# Patient Record
Sex: FEMALE | Race: WHITE | Employment: OTHER | URBAN - METROPOLITAN AREA
[De-identification: names, ages, dates, MRNs, and addresses within clinical notes are randomized per-mention and may not be internally consistent; named-entity substitution may affect disease eponyms.]

---

## 2017-01-11 ENCOUNTER — HOSPITAL ENCOUNTER (OUTPATIENT)
Dept: LAB | Age: 82
Discharge: HOME | End: 2017-01-11
Payer: MEDICARE

## 2017-01-11 PROCEDURE — 36415 COLL VENOUS BLD VENIPUNCTURE: CPT

## 2017-01-11 PROCEDURE — 85610 PROTHROMBIN TIME: CPT

## 2017-02-14 ENCOUNTER — OFFICE VISIT (OUTPATIENT)
Dept: INTERNAL MEDICINE CLINIC | Age: 82
End: 2017-02-14

## 2017-02-14 VITALS
HEART RATE: 62 BPM | DIASTOLIC BLOOD PRESSURE: 70 MMHG | HEIGHT: 66 IN | OXYGEN SATURATION: 90 % | WEIGHT: 126 LBS | SYSTOLIC BLOOD PRESSURE: 135 MMHG | BODY MASS INDEX: 20.25 KG/M2 | TEMPERATURE: 97.8 F | RESPIRATION RATE: 16 BRPM

## 2017-02-14 DIAGNOSIS — Z79.01 CHRONIC ANTICOAGULATION: Primary | ICD-10-CM

## 2017-02-14 DIAGNOSIS — F41.9 ANXIETY: ICD-10-CM

## 2017-02-14 DIAGNOSIS — I10 ESSENTIAL HYPERTENSION: ICD-10-CM

## 2017-02-14 DIAGNOSIS — K58.1 IRRITABLE BOWEL SYNDROME WITH CONSTIPATION: ICD-10-CM

## 2017-02-14 DIAGNOSIS — I48.20 CHRONIC ATRIAL FIBRILLATION (HCC): ICD-10-CM

## 2017-02-14 DIAGNOSIS — R42 DIZZINESS: ICD-10-CM

## 2017-02-14 LAB
INR BLD: 2.3
PT POC: 27.9 SEC
VALID INTERNAL CONTROL?: YES

## 2017-02-14 RX ORDER — POLYETHYLENE GLYCOL 3350 17 G/17G
17 POWDER, FOR SOLUTION ORAL DAILY
Qty: 100 PACKET | Refills: 1 | Status: SHIPPED | OUTPATIENT
Start: 2017-02-14 | End: 2017-09-11 | Stop reason: SDUPTHER

## 2017-02-14 RX ORDER — ALPRAZOLAM 0.25 MG/1
0.25 TABLET ORAL
Qty: 30 TAB | Refills: 0 | Status: SHIPPED | OUTPATIENT
Start: 2017-02-14 | End: 2018-08-09 | Stop reason: SDUPTHER

## 2017-02-14 NOTE — MR AVS SNAPSHOT
Visit Information Date & Time Provider Department Dept. Phone Encounter #  
 2/14/2017  3:30 PM Marine Armando MD [facility] [address] [id number] Follow-up Instructions Return in about 3 months (around 5/14/2017). Upcoming Health Maintenance Date Due Pneumococcal 65+ Low/Medium Risk (2 of 2 - PPSV23) 10/9/2003 INFLUENZA AGE 9 TO ADULT 8/1/2016 GLAUCOMA SCREENING Q2Y 4/8/2017 MEDICARE YEARLY EXAM 2/16/2017 DTaP/Tdap/Td series (2 - Td) 5/17/2026 Allergies as of 2/14/2017  Review Complete On: 2/14/2017 By: Marv Linares LPN Severity Noted Reaction Type Reactions Pcn [Penicillins]  10/05/2015    Hives Current Immunizations  Reviewed on 10/5/2015 Name Date Influenza Vaccine (Quad) PF 10/5/2015 Not reviewed this visit You Were Diagnosed With   
  
 Codes Comments Chronic anticoagulation    -  Primary ICD-10-CM: Z79.01 
ICD-9-CM: V58.61 Chronic atrial fibrillation (HCC)     ICD-10-CM: Z60.3 ICD-9-CM: 427.31 Essential hypertension     ICD-10-CM: I10 
ICD-9-CM: 401.9 Anxiety     ICD-10-CM: F41.9 ICD-9-CM: 300.00 Dizziness     ICD-10-CM: N50 ICD-9-CM: 780.4 Irritable bowel syndrome with constipation     ICD-10-CM: K58.1 ICD-9-CM: 507.5 Vitals BP Pulse Temp Resp Height(growth percentile) Weight(growth percentile) 135/70 (BP 1 Location: Left arm, BP Patient Position: Sitting) 62 97.8 °F (36.6 °C) (Oral) 16 5' 6\" (1.676 m) 126 lb (57.2 kg) SpO2 BMI OB Status Smoking Status 90% 20.34 kg/m2 Hysterectomy Former Smoker Vitals History BMI and BSA Data Body Mass Index Body Surface Area  
 20.34 kg/m 2 1.63 m 2 Preferred Pharmacy Pharmacy Name Phone [id number] [address] AT [address] [phone] Your Updated Medication List  
  
   
 This list is accurate as of: 2/14/17  4:12 PM.  Always use your most recent med list.  
  
  
  
  
 ALPRAZolam 0.25 mg tablet Commonly known as:  Mosani Harp Take 1 Tab by mouth two (2) times daily as needed for Anxiety. Max Daily Amount: 0.5 mg.  
  
 amLODIPine 5 mg tablet Commonly known as:  [place] Citron Take 1 Tab by mouth daily. bisoprolol-hydroCHLOROthiazide 10-6.25 mg per tablet Commonly known as:  [facility] TAKE ONE TABLET BY MOUTH ONCE DAILY  
  
 celecoxib 200 mg capsule Commonly known as:  CELEBREX Take 1 Cap by mouth daily as needed. fluticasone 50 mcg/actuation nasal spray Commonly known as:  [place] [place] 2 Sprays by Both Nostrils route daily. lisinopril 10 mg tablet Commonly known as:  PRINIVIL, ZESTRIL  
TAKE 1 TABLET BY MOUTH ONCE DAILY  
  
 lubiPROStone 8 mcg capsule Commonly known as:  Royden Crofts Take 1 Cap by mouth two (2) times daily (with meals). nitrofurantoin 50 mg capsule Commonly known as:  MACRODANTIN Take 1 Cap by mouth daily. polyethylene glycol 17 gram packet Commonly known as:  Rohan Boer Take 1 Packet by mouth daily. * warfarin 5 mg tablet Commonly known as:  COUMADIN Take 1 Tab by mouth every Tuesday, Thursday, Saturday & Sunday. * warfarin 4 mg tablet Commonly known as:  COUMADIN Take 1 Tab by mouth every Monday, Wednesday, Friday. * Notice: This list has 2 medication(s) that are the same as other medications prescribed for you. Read the directions carefully, and ask your doctor or other care provider to review them with you. Prescriptions Printed Refills ALPRAZolam (XANAX) 0.25 mg tablet 0 Sig: Take 1 Tab by mouth two (2) times daily as needed for Anxiety. Max Daily Amount: 0.5 mg.  
 Class: Print Route: Oral  
  
Prescriptions Sent to Pharmacy Refills  
 polyethylene glycol (MIRALAX) 17 gram packet 1 Sig: Take 1 Packet by mouth daily.   
 Class: Normal  
 Pharmacy: [facility] [address] #: [phone] Route: Oral  
  
We Performed the Following AMB POC PT/INR [38681 CPT(R)] Follow-up Instructions Return in about 3 months (around 5/14/2017). To-Do List   
 Around 03/28/2017 Lab:  PROTHROMBIN TIME + INR Introducing [facility] SERVICES! Dear Gauri Vaca: Thank you for requesting a [employer] account. Our records indicate that you already have an active [employer] account. You can access your account anytime at [email or web address] Did you know that you can access your hospital and ER discharge instructions at any time in [employer]? You can also review all of your test results from your hospital stay or ER visit. Additional Information If you have questions, please visit the Frequently Asked Questions section of the [employer] website at [email or web address] Remember, [employer] is NOT to be used for urgent needs. For medical emergencies, dial 911. Now available from your iPhone and Android! Please provide this summary of care documentation to your next provider. Your primary care clinician is listed as Hannah Stanley. If you have any questions after today's visit, please call [phone].

## 2017-02-14 NOTE — PROGRESS NOTES
Wisam Victoria is a 80 y.o. female who presents today for Hypertension  . She has a history of   Patient Active Problem List   Diagnosis Code    HTN (hypertension) I10    IBS (irritable bowel syndrome) K58.9    Cataract H26.9    Diverticulosis K57.90    Atrial fibrillation (HCC) I48.91    Chronic cystitis N30.20    Chronic anticoagulation Z79.01    Advanced care planning/counseling discussion Z71.89   . Today patient is here for INR and HTN. she does have other concerns. Dizziness: Has had a couple episodes. No palpitations. Mild nausea. No vomiting. Pt notes feeling like she is going to pass out but doesn't. Her adult son has cheated on his wife. She is having some increased anxiety about this. Disappointed in her son. Pt has a Rx for Xanax from 2015 with still some. Anticoagulation  Patient here for followup of chronic anticoagulation. Indication: Atrial Fibrillation. Bleeding Signs/Symptoms:  None. Compliance with warfarin:  Yes  INR's are drawn regularly and have been Therapeutic. Lab Results   Component Value Date/Time    INR 2.4 01/11/2017 02:58 PM    INR 2.1 11/17/2016 04:38 PM    INR 3.4 10/17/2016 12:52 PM    INR POC 2.3 02/14/2017 03:51 PM    INR POC 1.7 08/18/2016 02:26 PM    INR POC 2.2 05/17/2016 02:29 PM    Prothrombin time 24.9 01/11/2017 02:58 PM    Prothrombin time 21.5 11/17/2016 04:38 PM    Prothrombin time 34.3 10/17/2016 12:52 PM     Hypertension  Hypertension ROS: taking medications as instructed, no medication side effects noted, no TIA's, no chest pain on exertion, no dyspnea on exertion, no swelling of ankles     reports that she has quit smoking. She has never used smokeless tobacco.    reports that she drinks alcohol. BP Readings from Last 2 Encounters:   02/14/17 135/70   11/17/16 136/85          ROS  Review of Systems   Constitutional: Negative for chills, fever and weight loss. HENT: Negative for congestion and sore throat.     Eyes: Negative for blurred vision, double vision and photophobia. Respiratory: Negative for cough and shortness of breath. Cardiovascular: Negative for chest pain, palpitations and leg swelling. Gastrointestinal: Negative for abdominal pain, constipation, diarrhea, heartburn, nausea and vomiting. Genitourinary: Negative for dysuria, frequency and urgency. Musculoskeletal: Negative for joint pain and myalgias. Skin: Negative for rash. Neurological: Negative. Negative for headaches. Endo/Heme/Allergies: Does not bruise/bleed easily. Psychiatric/Behavioral: Negative for depression, hallucinations, memory loss, substance abuse and suicidal ideas. The patient is nervous/anxious and has insomnia. Visit Vitals    /70 (BP 1 Location: Left arm, BP Patient Position: Sitting)    Pulse 62    Temp 97.8 °F (36.6 °C) (Oral)    Resp 16    Ht 5' 6\" (1.676 m)    Wt 126 lb (57.2 kg)    SpO2 90%    BMI 20.34 kg/m2       Physical Exam   Constitutional: She is oriented to person, place, and time. She appears well-developed and well-nourished. No distress. HENT:   Head: Normocephalic and atraumatic. Neck: Normal range of motion. Neck supple. No thyromegaly present. Cardiovascular: Normal rate and regular rhythm. No murmur heard. Pulmonary/Chest: Effort normal and breath sounds normal. She has no wheezes. Abdominal: Soft. Bowel sounds are normal. She exhibits no distension. Musculoskeletal: Normal range of motion. She exhibits no edema. Neurological: She is alert and oriented to person, place, and time. No cranial nerve deficit. Skin: Skin is warm and dry. Psychiatric: She has a normal mood and affect.  Her behavior is normal.         Current Outpatient Prescriptions   Medication Sig    bisoprolol-hydroCHLOROthiazide (ZIAC) 10-6.25 mg per tablet TAKE ONE TABLET BY MOUTH ONCE DAILY    lisinopril (PRINIVIL, ZESTRIL) 10 mg tablet TAKE 1 TABLET BY MOUTH ONCE DAILY    fluticasone (FLONASE) 50 mcg/actuation nasal spray 2 Sprays by Both Nostrils route daily.  lubiPROStone (AMITIZA) 8 mcg capsule Take 1 Cap by mouth two (2) times daily (with meals).  nitrofurantoin (MACRODANTIN) 50 mg capsule Take 1 Cap by mouth daily.  warfarin (COUMADIN) 4 mg tablet Take 1 Tab by mouth every Monday, Wednesday, Friday.  warfarin (COUMADIN) 5 mg tablet Take 1 Tab by mouth every Tuesday, Thursday, Saturday & .  celecoxib (CELEBREX) 200 mg capsule Take 1 Cap by mouth daily as needed.  polyethylene glycol (MIRALAX) 17 gram packet Take 1 Packet by mouth daily.  amLODIPine (NORVASC) 5 mg tablet Take 1 Tab by mouth daily. No current facility-administered medications for this visit. Past Medical History   Diagnosis Date    Diverticula, colon     Hypertension     IBS (irritable bowel syndrome)     Irregular heart beat       Past Surgical History   Procedure Laterality Date    Hx colonoscopy  8/28/15     with endoscopy day before    Hx hysterectomy      Hx  section       X 3      Social History   Substance Use Topics    Smoking status: Former Smoker    Smokeless tobacco: Never Used    Alcohol use Yes      Family History   Problem Relation Age of Onset    Tuberculosis Mother         Allergies   Allergen Reactions    Pcn [Penicillins] Hives        Assessment/Plan  Laurence Castro was seen today for hypertension. Diagnoses and all orders for this visit:    Chronic anticoagulation - stable continue current dose  -     AMB POC PT/INR  -     PROTHROMBIN TIME + INR; Future    Chronic atrial fibrillation (HCC) - rate controlled. Essential hypertension - stable. Anxiety - Has  bottle from . Refill for PRN use.   -     ALPRAZolam (XANAX) 0.25 mg tablet; Take 1 Tab by mouth two (2) times daily as needed for Anxiety. Max Daily Amount: 0.5 mg.    Dizziness - a couple of episodes recently. Discussed that needs pulse checked during these episodes.  If continues ECHO +/- holter. Irritable bowel syndrome with constipation  -     polyethylene glycol (MIRALAX) 17 gram packet; Take 1 Packet by mouth daily.         Follow-up Disposition: Not on File    Cb Savage MD  2/14/2017

## 2017-03-28 DIAGNOSIS — Z79.01 CHRONIC ANTICOAGULATION: ICD-10-CM

## 2017-04-10 ENCOUNTER — HOSPITAL ENCOUNTER (OUTPATIENT)
Dept: LAB | Age: 82
Discharge: HOME | End: 2017-04-10
Payer: MEDICARE

## 2017-04-10 PROCEDURE — 36415 COLL VENOUS BLD VENIPUNCTURE: CPT

## 2017-04-10 PROCEDURE — 85610 PROTHROMBIN TIME: CPT

## 2017-04-11 ENCOUNTER — TELEPHONE (OUTPATIENT)
Dept: INTERNAL MEDICINE CLINIC | Age: 82
End: 2017-04-11

## 2017-04-11 LAB
INR PPP: 2.6 (ref 0.8–1.2)
PROTHROMBIN TIME: 26.1 SEC (ref 9.1–12)

## 2017-05-19 ENCOUNTER — TELEPHONE (OUTPATIENT)
Dept: INTERNAL MEDICINE CLINIC | Age: 82
End: 2017-05-19

## 2017-05-19 NOTE — TELEPHONE ENCOUNTER
[facility] sent a fax the other day and has not heard any thing back on it was for the  Lisinopril 10 mg.   [facility] no [phone]

## 2017-06-12 ENCOUNTER — HOSPITAL ENCOUNTER (OUTPATIENT)
Dept: LAB | Age: 82
Discharge: HOME | End: 2017-06-12
Payer: MEDICARE

## 2017-06-12 ENCOUNTER — OFFICE VISIT (OUTPATIENT)
Dept: INTERNAL MEDICINE CLINIC | Age: 82
End: 2017-06-12

## 2017-06-12 VITALS
WEIGHT: 126 LBS | OXYGEN SATURATION: 96 % | RESPIRATION RATE: 16 BRPM | SYSTOLIC BLOOD PRESSURE: 114 MMHG | BODY MASS INDEX: 20.25 KG/M2 | TEMPERATURE: 98.3 F | HEART RATE: 98 BPM | HEIGHT: 66 IN | DIASTOLIC BLOOD PRESSURE: 70 MMHG

## 2017-06-12 DIAGNOSIS — J30.9 ALLERGIC RHINITIS, UNSPECIFIED ALLERGIC RHINITIS TRIGGER, UNSPECIFIED RHINITIS SEASONALITY: ICD-10-CM

## 2017-06-12 DIAGNOSIS — R25.1 TREMOR OF BOTH HANDS: ICD-10-CM

## 2017-06-12 DIAGNOSIS — Z13.39 SCREENING FOR ALCOHOLISM: ICD-10-CM

## 2017-06-12 DIAGNOSIS — Z00.00 MEDICARE ANNUAL WELLNESS VISIT, SUBSEQUENT: Primary | ICD-10-CM

## 2017-06-12 DIAGNOSIS — I48.20 CHRONIC ATRIAL FIBRILLATION (HCC): ICD-10-CM

## 2017-06-12 DIAGNOSIS — I10 ESSENTIAL HYPERTENSION: ICD-10-CM

## 2017-06-12 DIAGNOSIS — Z13.31 DEPRESSION SCREENING: ICD-10-CM

## 2017-06-12 DIAGNOSIS — Z71.89 ADVANCED CARE PLANNING/COUNSELING DISCUSSION: ICD-10-CM

## 2017-06-12 DIAGNOSIS — Z00.00 ROUTINE GENERAL MEDICAL EXAMINATION AT A HEALTH CARE FACILITY: ICD-10-CM

## 2017-06-12 PROCEDURE — 36415 COLL VENOUS BLD VENIPUNCTURE: CPT

## 2017-06-12 PROCEDURE — 80053 COMPREHEN METABOLIC PANEL: CPT

## 2017-06-12 PROCEDURE — 85610 PROTHROMBIN TIME: CPT

## 2017-06-12 PROCEDURE — 85025 COMPLETE CBC W/AUTO DIFF WBC: CPT

## 2017-06-12 PROCEDURE — 84443 ASSAY THYROID STIM HORMONE: CPT

## 2017-06-12 RX ORDER — FLUTICASONE PROPIONATE 50 MCG
2 SPRAY, SUSPENSION (ML) NASAL DAILY
Qty: 1 BOTTLE | Refills: 6 | Status: SHIPPED | OUTPATIENT
Start: 2017-06-12 | End: 2018-05-01 | Stop reason: SDUPTHER

## 2017-06-12 NOTE — MR AVS SNAPSHOT
Visit Information Date & Time Provider Department Dept. Phone Encounter #  
 6/12/2017  1:00 PM Cristofer Burrows MD [facility] [address] [id number] Follow-up Instructions Return for Follow up after MRI. Upcoming Health Maintenance Date Due Pneumococcal 65+ Low/Medium Risk (2 of 2 - PPSV23) 10/9/2003 MEDICARE YEARLY EXAM 2/16/2017 GLAUCOMA SCREENING Q2Y 4/8/2017 INFLUENZA AGE 9 TO ADULT 8/1/2017 DTaP/Tdap/Td series (2 - Td) 5/17/2026 Allergies as of 6/12/2017  Review Complete On: 6/40/8777 By: Les Ache Wears Severity Noted Reaction Type Reactions Pcn [Penicillins]  10/05/2015    Hives Current Immunizations  Reviewed on 10/5/2015 Name Date Influenza Vaccine (Quad) PF 10/5/2015 Not reviewed this visit You Were Diagnosed With   
  
 Codes Comments Chronic atrial fibrillation (HCC)    -  Primary ICD-10-CM: U52.8 ICD-9-CM: 427.31 Essential hypertension     ICD-10-CM: I10 
ICD-9-CM: 401.9 Tremor of both hands     ICD-10-CM: R25.1 ICD-9-CM: 781.0 Allergic rhinitis, unspecified allergic rhinitis trigger, unspecified rhinitis seasonality     ICD-10-CM: J30.9 ICD-9-CM: 477.9 Vitals BP Pulse Temp Resp Height(growth percentile) Weight(growth percentile) 114/70 (BP 1 Location: Left arm, BP Patient Position: Sitting) 98 98.3 °F (36.8 °C) (Oral) 16 5' 6\" (1.676 m) 126 lb (57.2 kg) SpO2 BMI OB Status Smoking Status 96% 20.34 kg/m2 Hysterectomy Former Smoker Vitals History BMI and BSA Data Body Mass Index Body Surface Area  
 20.34 kg/m 2 1.63 m 2 Preferred Pharmacy Pharmacy Name Phone [id number] [place] AT [facility] [phone] Your Updated Medication List  
  
   
This list is accurate as of: 6/12/17  1:41 PM.  Always use your most recent med list.  
  
  
  
  
 ALPRAZolam 0.25 mg tablet Commonly known as:  Graeme Inch Take 1 Tab by mouth two (2) times daily as needed for Anxiety. Max Daily Amount: 0.5 mg.  
  
 amLODIPine 5 mg tablet Commonly known as:  [place] Pupa Take 1 Tab by mouth daily. bisoprolol-hydroCHLOROthiazide 10-6.25 mg per tablet Commonly known as:  [facility] TAKE ONE TABLET BY MOUTH ONCE DAILY  
  
 celecoxib 200 mg capsule Commonly known as:  CELEBREX Take 1 Cap by mouth daily as needed. fluticasone 50 mcg/actuation nasal spray Commonly known as:  Randi Morrison 2 Sprays by Both Nostrils route daily. lisinopril 10 mg tablet Commonly known as:  PRINIVIL, ZESTRIL  
TAKE 1 TABLET BY MOUTH EVERY DAY  
  
 lubiPROStone 8 mcg capsule Commonly known as:  Eulah Michelle Take 1 Cap by mouth two (2) times daily (with meals). nitrofurantoin 50 mg capsule Commonly known as:  MACRODANTIN  
TAKE ONE CAPSULE BY MOUTH EVERY DAY  
  
 polyethylene glycol 17 gram packet Commonly known as:  Matt Aus Take 1 Packet by mouth daily. * warfarin 5 mg tablet Commonly known as:  COUMADIN Take 1 Tab by mouth every Tuesday, Thursday, Saturday & . * warfarin 4 mg tablet Commonly known as:  COUMADIN Take 1 Tab by mouth every Monday, Wednesday, Friday. * Notice: This list has 2 medication(s) that are the same as other medications prescribed for you. Read the directions carefully, and ask your doctor or other care provider to review them with you. Prescriptions Sent to Pharmacy Refills  
 fluticasone (FLONASE) 50 mcg/actuation nasal spray 6 Si Sprays by Both Nostrils route daily. Class: Normal  
 Pharmacy: [id number] Ph #: [phone] Route: Both Nostrils We Performed the Following CBC WITH AUTOMATED DIFF [81806 CPT(R)] METABOLIC PANEL, COMPREHENSIVE [52517 CPT(R)] PROTHROMBIN TIME + INR [87682 CPT(R)] TSH 3RD GENERATION [98208 CPT(R)] Follow-up Instructions Return for Follow up after MRI. To-Do List   
 06/12/2017 Imaging:  MRI BRAIN W WO CONT Introducing [facility] SERVICES! Dear Jose Smith: Thank you for requesting a [employer] account. Our records indicate that you already have an active [employer] account. You can access your account anytime at [email or web address] Did you know that you can access your hospital and ER discharge instructions at any time in [employer]? You can also review all of your test results from your hospital stay or ER visit. Additional Information If you have questions, please visit the Frequently Asked Questions section of the [employer] website at [email or web address] Remember, [employer] is NOT to be used for urgent needs. For medical emergencies, dial 911. Now available from your iPhone and Android! Please provide this summary of care documentation to your next provider. Your primary care clinician is listed as Wyonia December. If you have any questions after today's visit, please call [phone].

## 2017-06-12 NOTE — PATIENT INSTRUCTIONS
Today's Date -  6/12/2017  Medicare Part B Preventive Services Limitations Recommendation/Date completed if known Scheduled/ Next Due   Bone Mass Measurement  (age 72 & older, biennial) Requires diagnosis related to osteoporosis or estrogen deficiency. Biennial benefit unless patient has history of long-term glucocorticoid tx or baseline is needed because initial test was by other method Completed:  2003 per pt, would like to wait till after MRI to have done. Recommended every 2 years As recommended by your specialist or PCP   Cardiovascular Screening Blood Tests (every 5 years)  Total cholesterol, HDL, Triglycerides Order as a panel if possible Completed:  11/2016    Recommended annually As recommended by your PCP. Colorectal Cancer Screening  -Fecal occult blood test (annual)  -Flexible sigmoidoscopy (5y)  -Screening colonoscopy (10y)  -Barium Enema  Completed:  8/2015    No longer needed these exams  Recommended  Every 5to 10 years. As recommended by your PCP/Specialist   Counseling to Prevent Tobacco Use (up to 8 sessions per year)  - Counseling greater than 3 and up to 10 minutes  - Counseling greater than 10 minutes Patients must be asymptomatic of tobacco-related conditions to receive as preventive service n/a n/a   Prevnar 13 vaccine    9/2015   complete     TDAP Vaccine    9/2015 complete   Shingles Vaccine        6/2009 complete   Influenza Vaecine     11/2016   Due Every Fall   Pneumonia Vaccine      10/2002 complete   Diabetes Screening Tests (at least every 3 years, Medicare covers annually or at 6-month intervals for prediabetic patients)    Fasting blood sugar (FBS) or glucose tolerance test (GTT)       Patient must be diagnosed with one of the following:  -Hypertension, Dyslipidemia, obesity, previous impaired FBS or GTT  Or any two of the following: overweight, FH of diabetes, age ? 72, history of gestational diabetes, birth of baby weighing more than 9 pounds Completed:    11/2016    Recommended every 3 years for non-diabetics. Recommended every 3-6 months for Pre-Diabetics and Diabetes. DUE:   Diabetes Self-Management Training (DSMT) (no USPSTF recommendation) Requires referral by treating physician for patient with diabetes or renal disease. 10 hours of initial DSMT session of no less than 30 minutes each in a continuous 12-month period. 2 hours of follow-up DSMT in subsequent years. n/a n/a   Glaucoma Screening (no USPSTF recommendation) Diabetes mellitus, family history, , age 48 or over,  American, age 72 or over Completed: Within the last year    Recommended annually DUE: annually   Medical Nutrition Therapy (MNT) (fordiabetes or renal disease not recommended schedule) Requires referral by treating physician for patient with diabetes or renal disease. Can be provided in same year as diabetes self-management training (DSMT), and CMS recommends medical nutrition therapy take place after DSMT. Up to 3 hours for initial year and 2 hours in subsequent years. n/a n/a   Hepatitis B Vaccinations (if medium/high risk) Medium/high risk factors:  End-stage renal disease,  Hemophiliacs who received Factor VIII or IX concentrates, Clients of institutions for the mentally retarded, Persons who live in the same house as a HepB virus carrier, Homosexual men, Illicit injectable drug abusers. n/a n/a   Screening Mammography (biennial age 54-69)? Annually (age 36 or over) Completed:     No longer needing  Recommended annually over the age of 36. As recommended by your PCP/Specialist   Screening Pap Tests and Pelvic Examination (up to age 79 and after 79 if unknown history or abnormal study last 10 years) Every 24 months except high risk Completed:      No longer needing  Recommended every 2 years As recommended by your PCP/Specialist   Thanks for coming in today. It was nice to spend some time with you.  If you have any questions about your visit today, please call [phone] and ask to speak with Natali

## 2017-06-12 NOTE — PROGRESS NOTES
Rah Billingsley is a 80 y.o. female who presents today for Irregular Heart Beat; Hypertension; and Annual Wellness Visit  . She has a history of   Patient Active Problem List   Diagnosis Code    HTN (hypertension) I10    IBS (irritable bowel syndrome) K58.9    Cataract H26.9    Diverticulosis K57.90    Atrial fibrillation (HCC) I48.91    Chronic cystitis N30.20    Chronic anticoagulation Z79.01    Advanced care planning/counseling discussion Z71.89   . Today patient is here for f/u on her INR, a tremor and MW. MW portion performed by NN, please see her note. Tremor: noted to be getting a bit worse. Seems to be Intention Tremor. No meds have changed. Mental status stable and doing well. Mild gait issues. Exercise tolerance is stable. Some mild weakness in hands which is chronic. This is new as to last couple weeks. Still taking amitiza. She is on a very low dose BB. Some weakness. No LOC, no dizziness, but some ortho stasis. Anticoagulation  Patient here for followup of chronic anticoagulation. Indication: Atrial Fibrillation. Bleeding Signs/Symptoms:  None. Compliance with warfarin:  Yes  INR's are drawn regularly and have been Therapeutic. Lab Results   Component Value Date/Time    INR 2.6 04/10/2017 11:50 AM    INR 2.4 01/11/2017 02:58 PM    INR 2.1 11/17/2016 04:38 PM    INR POC 2.3 02/14/2017 03:51 PM    Prothrombin time 26.1 04/10/2017 11:50 AM    Prothrombin time 24.9 01/11/2017 02:58 PM    Prothrombin time 21.5 11/17/2016 04:38 PM     Hypertension - now on 3 meds. CCB restarted due to some elevations. She will check this when feeling poor. Hypertension ROS: taking medications as instructed, no medication side effects noted, no TIA's, no chest pain on exertion, no dyspnea on exertion, no swelling of ankles     reports that she has quit smoking. She has never used smokeless tobacco.    reports that she drinks alcohol.    BP Readings from Last 2 Encounters:   06/12/17 114/70 02/14/17 135/70         ROS  Review of Systems   Constitutional: Negative for chills, diaphoresis, fever, malaise/fatigue and weight loss. HENT: Negative for ear discharge, ear pain, hearing loss, nosebleeds and tinnitus. Eyes: Negative for blurred vision, double vision, photophobia and pain. Respiratory: Negative for cough, hemoptysis, sputum production and shortness of breath. Cardiovascular: Negative for chest pain, palpitations, orthopnea and claudication. Gastrointestinal: Positive for constipation. Negative for abdominal pain, diarrhea, heartburn, nausea and vomiting. Genitourinary: Negative for dysuria, frequency, hematuria and urgency. Musculoskeletal: Positive for joint pain (Hand pain). Negative for myalgias. Skin: Negative for itching and rash. Neurological: Positive for tremors. Negative for dizziness, tingling, sensory change, speech change, focal weakness, seizures, loss of consciousness and headaches. Psychiatric/Behavioral: Negative for depression, hallucinations, substance abuse and suicidal ideas. Visit Vitals    /70 (BP 1 Location: Left arm, BP Patient Position: Sitting)    Pulse 98    Temp 98.3 °F (36.8 °C) (Oral)    Resp 16    Ht 5' 6\" (1.676 m)    Wt 126 lb (57.2 kg)    SpO2 96%    BMI 20.34 kg/m2       Physical Exam   Constitutional: She is oriented to person, place, and time. She appears well-developed and well-nourished. HENT:   Head: Normocephalic and atraumatic. Eyes: EOM are normal. Pupils are equal, round, and reactive to light. Cardiovascular:   No murmur heard. Irregular. Pulmonary/Chest: Effort normal and breath sounds normal.   Abdominal: Soft. Bowel sounds are normal.   Neurological: She is alert and oriented to person, place, and time. She displays normal reflexes. No cranial nerve deficit. She exhibits normal muscle tone. Coordination normal.   Cerebellar testing, finger to nose and Romberg normal.   No obvious tremor present. Skin: Skin is warm and dry. Psychiatric: She has a normal mood and affect. Her behavior is normal.         Current Outpatient Prescriptions   Medication Sig    fluticasone (FLONASE) 50 mcg/actuation nasal spray 2 Sprays by Both Nostrils route daily.  nitrofurantoin (MACRODANTIN) 50 mg capsule TAKE ONE CAPSULE BY MOUTH EVERY DAY    lisinopril (PRINIVIL, ZESTRIL) 10 mg tablet TAKE 1 TABLET BY MOUTH EVERY DAY    ALPRAZolam (XANAX) 0.25 mg tablet Take 1 Tab by mouth two (2) times daily as needed for Anxiety. Max Daily Amount: 0.5 mg.  polyethylene glycol (MIRALAX) 17 gram packet Take 1 Packet by mouth daily.  bisoprolol-hydroCHLOROthiazide (ZIAC) 10-6.25 mg per tablet TAKE ONE TABLET BY MOUTH ONCE DAILY    lubiPROStone (AMITIZA) 8 mcg capsule Take 1 Cap by mouth two (2) times daily (with meals). (Patient taking differently: Take 8 mcg by mouth every other day.)    warfarin (COUMADIN) 4 mg tablet Take 1 Tab by mouth every Monday, Wednesday, Friday.  warfarin (COUMADIN) 5 mg tablet Take 1 Tab by mouth every Tuesday, Thursday, Saturday & .  celecoxib (CELEBREX) 200 mg capsule Take 1 Cap by mouth daily as needed.  amLODIPine (NORVASC) 5 mg tablet Take 1 Tab by mouth daily. No current facility-administered medications for this visit.          Past Medical History:   Diagnosis Date    Diverticula, colon     Hypertension     IBS (irritable bowel syndrome)     Irregular heart beat       Past Surgical History:   Procedure Laterality Date    HX  SECTION      X 3    HX COLONOSCOPY  8/28/15    with endoscopy day before    HX HYSTERECTOMY        Social History   Substance Use Topics    Smoking status: Former Smoker    Smokeless tobacco: Never Used    Alcohol use Yes      Family History   Problem Relation Age of Onset    Tuberculosis Mother         Allergies   Allergen Reactions    Pcn [Penicillins] Hives        Assessment/Plan  Kel Castro was seen today for irregular heart beat, hypertension and annual wellness visit. Diagnoses and all orders for this visit:    Medicare annual wellness visit, subsequent - See NN note. HM UTD. Not interested in further cancer prevention screening. Very healthy and active at her age. Everardo Bee was counseled on age-appropriate/ guideline-based risk prevention behaviors and screening for a 80y.o. year old   female . We also discussed adjustments in screening based on family history if necessary. Printed instructions for preventative screening guidelines and healthy behaviors given to patient with after visit summary. Chronic atrial fibrillation (HCC) - on anticoagulation. Check INR  -     PROTHROMBIN TIME + INR    Essential hypertension - Borderline low today. Pt to check this as outpt as she can have some orthostatic symptoms at times. Tremor of both hands - new and acute in onset. Only with use, no tremor with rest. No family history. Image head as she has history of Afib. Blood work. May increase BB dose if workup negative to treat essential tremor.   -     TSH 3RD GENERATION  -     METABOLIC PANEL, COMPREHENSIVE  -     CBC WITH AUTOMATED DIFF  -     MRI BRAIN W WO CONT; Future    Allergic rhinitis, unspecified allergic rhinitis trigger, unspecified rhinitis seasonality  -     fluticasone (FLONASE) 50 mcg/actuation nasal spray; 2 Sprays by Both Nostrils route daily. Routine general medical examination at a health care facility    Screening for alcoholism    Advanced care planning/counseling discussion    Depression screening        Follow-up Disposition:  Return for Follow up after MRI.     Katey Castanon MD  6/12/2017

## 2017-06-12 NOTE — PROGRESS NOTES
This is a Subsequent Medicare Annual Wellness Visit providing Personalized Prevention Plan Services (PPPS) (Performed 12 months after initial AWV and PPPS )    I have reviewed the patient's medical history in detail and updated the computerized patient record. History     Past Medical History:   Diagnosis Date    Diverticula, colon     Hypertension     IBS (irritable bowel syndrome)     Irregular heart beat       Past Surgical History:   Procedure Laterality Date    HX  SECTION      X 3    HX COLONOSCOPY  8/28/15    with endoscopy day before    HX HYSTERECTOMY       Current Outpatient Prescriptions   Medication Sig Dispense Refill    fluticasone (FLONASE) 50 mcg/actuation nasal spray 2 Sprays by Both Nostrils route daily. 1 Bottle 6    nitrofurantoin (MACRODANTIN) 50 mg capsule TAKE ONE CAPSULE BY MOUTH EVERY DAY 90 Cap 1    lisinopril (PRINIVIL, ZESTRIL) 10 mg tablet TAKE 1 TABLET BY MOUTH EVERY DAY 90 Tab 1    ALPRAZolam (XANAX) 0.25 mg tablet Take 1 Tab by mouth two (2) times daily as needed for Anxiety. Max Daily Amount: 0.5 mg. 30 Tab 0    polyethylene glycol (MIRALAX) 17 gram packet Take 1 Packet by mouth daily. 100 Packet 1    bisoprolol-hydroCHLOROthiazide (ZIAC) 10-6.25 mg per tablet TAKE ONE TABLET BY MOUTH ONCE DAILY 90 Tab 1    lubiPROStone (AMITIZA) 8 mcg capsule Take 1 Cap by mouth two (2) times daily (with meals). (Patient taking differently: Take 8 mcg by mouth every other day.) 60 Cap 3    warfarin (COUMADIN) 4 mg tablet Take 1 Tab by mouth every Monday, Wednesday, Friday. 30 Tab 5    warfarin (COUMADIN) 5 mg tablet Take 1 Tab by mouth every Tuesday, Thursday, Saturday & . 30 Tab 3    celecoxib (CELEBREX) 200 mg capsule Take 1 Cap by mouth daily as needed. 30 Cap 4    amLODIPine (NORVASC) 5 mg tablet Take 1 Tab by mouth daily.  30 Tab 3     Allergies   Allergen Reactions    Pcn [Penicillins] Hives     Family History   Problem Relation Age of Onset    Tuberculosis Mother      Social History   Substance Use Topics    Smoking status: Former Smoker    Smokeless tobacco: Never Used    Alcohol use Yes     Patient Active Problem List   Diagnosis Code    HTN (hypertension) I10    IBS (irritable bowel syndrome) K58.9    Cataract H26.9    Diverticulosis K57.90    Atrial fibrillation (HCC) I48.91    Chronic cystitis N30.20    Chronic anticoagulation Z79.01    Advanced care planning/counseling discussion Z71.89       Depression Risk Factor Screening:     PHQ over the last two weeks 6/12/2017   Little interest or pleasure in doing things Not at all   Feeling down, depressed or hopeless Not at all   Total Score PHQ 2 0     Alcohol Risk Factor Screening: On any occasion during the past 3 months, have you had more than 3 drinks containing alcohol? No    Do you average more than 7 drinks per week? No      Functional Ability and Level of Safety:     Hearing Loss   normal-to-mild    Activities of Daily Living   Self-care. Requires assistance with: no ADLs    Fall Risk   Patient states that she has fallen a few times with tripping over rugs or slipping on the floor but neither with any acute injuries at that time. Fall Risk Assessment, last 12 mths 6/12/2017   Able to walk? Yes   Fall in past 12 months? Yes   Fall with injury? No   Number of falls in past 12 months 1   Fall Risk Score 1     Abuse Screen   Patient is not abused    Review of Systems   Not required    Physical Examination     Evaluation of Cognitive Function:  Mood/affect:  happy  Appearance: age appropriate  Family member/caregiver input: daughter present    No exam performed today, MWV today.     Patient Care Team:  Gladys Staley MD as PCP - General (Internal Medicine)    Advice/Referrals/Counseling   Education and counseling provided:  Pneumococcal Vaccine  Influenza Vaccine  Screening Mammography  Colorectal cancer screening tests  Bone mass measurement (DEXA)  Advanced Medical Directive      Assessment/Plan       ICD-10-CM ICD-9-CM    1. Chronic atrial fibrillation (HCC) I48.2 427.31 PROTHROMBIN TIME + INR   2. Essential hypertension I10 401.9    3. Tremor of both hands R25.1 781.0 TSH 3RD GENERATION      METABOLIC PANEL, COMPREHENSIVE      CBC WITH AUTOMATED DIFF      MRI BRAIN W WO CONT   4. Allergic rhinitis, unspecified allergic rhinitis trigger, unspecified rhinitis seasonality J30.9 477.9 fluticasone (FLONASE) 50 mcg/actuation nasal spray   . 1. Patient states that a completed Advanced Medical Directive is at home. NN encouraged patient to bring a copy of the completed Advanced Medical Directive to the office for scanning into the medical record. Patient verbalized understanding & agreement. 2. Patient is up to date on the following immunizations:   Immunization History   Administered Date(s) Administered    Influenza High Dose Vaccine PF 11/01/2016    Influenza Vaccine (Quad) PF 10/05/2015    Pneumococcal Conjugate (PCV-13) 09/01/2015    Pneumococcal Polysaccharide (PPSV-23) 10/01/2002    Tdap 09/01/2015    Zoster Vaccine, Live 06/01/2009     3. Patient reports having a routine eye exam and glaucoma screening this past year with Dr. Tavares Lynch. Will fax over a KARLENE and obtain records and last noted with patient's verbal approval.     4. Please see depression screening and fall risk assessment section for additional information. Patient verbalized understanding of information presented and was given and opportunity to ask questions. Patient provided AVS, either a printed version or electronic version in [place], which includes Medicare Wellness Preventative Screening Table. Medication reconciliation completed by MA/ LPN and reviewed by PCP. Please bring a copy of your completed advance medical directive to the office so it may be added to your medical record. Thank you.

## 2017-06-13 LAB
ALBUMIN SERPL-MCNC: 4.4 G/DL (ref 3.5–4.7)
ALBUMIN/GLOB SERPL: 1.8 {RATIO} (ref 1.2–2.2)
ALP SERPL-CCNC: 110 IU/L (ref 39–117)
ALT SERPL-CCNC: 16 IU/L (ref 0–32)
AST SERPL-CCNC: 21 IU/L (ref 0–40)
BASOPHILS # BLD AUTO: 0.1 X10E3/UL (ref 0–0.2)
BASOPHILS NFR BLD AUTO: 1 %
BILIRUB SERPL-MCNC: 0.9 MG/DL (ref 0–1.2)
BUN SERPL-MCNC: 16 MG/DL (ref 8–27)
BUN/CREAT SERPL: 23 (ref 12–28)
CALCIUM SERPL-MCNC: 9.2 MG/DL (ref 8.7–10.3)
CHLORIDE SERPL-SCNC: 97 MMOL/L (ref 96–106)
CO2 SERPL-SCNC: 26 MMOL/L (ref 18–29)
CREAT SERPL-MCNC: 0.7 MG/DL (ref 0.57–1)
EOSINOPHIL # BLD AUTO: 0.1 X10E3/UL (ref 0–0.4)
EOSINOPHIL NFR BLD AUTO: 2 %
ERYTHROCYTE [DISTWIDTH] IN BLOOD BY AUTOMATED COUNT: 14.3 % (ref 12.3–15.4)
GLOBULIN SER CALC-MCNC: 2.4 G/DL (ref 1.5–4.5)
GLUCOSE SERPL-MCNC: 104 MG/DL (ref 65–99)
HCT VFR BLD AUTO: 43.3 % (ref 34–46.6)
HGB BLD-MCNC: 14.8 G/DL (ref 11.1–15.9)
IMM GRANULOCYTES # BLD: 0 X10E3/UL (ref 0–0.1)
IMM GRANULOCYTES NFR BLD: 0 %
INR PPP: 2.4 (ref 0.8–1.2)
LYMPHOCYTES # BLD AUTO: 1.3 X10E3/UL (ref 0.7–3.1)
LYMPHOCYTES NFR BLD AUTO: 24 %
MCH RBC QN AUTO: 33 PG (ref 26.6–33)
MCHC RBC AUTO-ENTMCNC: 34.2 G/DL (ref 31.5–35.7)
MCV RBC AUTO: 97 FL (ref 79–97)
MONOCYTES # BLD AUTO: 0.5 X10E3/UL (ref 0.1–0.9)
MONOCYTES NFR BLD AUTO: 9 %
NEUTROPHILS # BLD AUTO: 3.5 X10E3/UL (ref 1.4–7)
NEUTROPHILS NFR BLD AUTO: 64 %
PLATELET # BLD AUTO: 185 X10E3/UL (ref 150–379)
POTASSIUM SERPL-SCNC: 4.4 MMOL/L (ref 3.5–5.2)
PROT SERPL-MCNC: 6.8 G/DL (ref 6–8.5)
PROTHROMBIN TIME: 24.1 SEC (ref 9.1–12)
RBC # BLD AUTO: 4.48 X10E6/UL (ref 3.77–5.28)
SODIUM SERPL-SCNC: 141 MMOL/L (ref 134–144)
TSH SERPL DL<=0.005 MIU/L-ACNC: 0.59 UIU/ML (ref 0.45–4.5)
WBC # BLD AUTO: 5.5 X10E3/UL (ref 3.4–10.8)

## 2017-06-14 NOTE — PROGRESS NOTES
Please let her know that I sent her a letter, but that her INR is stable. No need to change dose of coumadin.

## 2017-06-16 ENCOUNTER — TELEPHONE (OUTPATIENT)
Dept: INTERNAL MEDICINE CLINIC | Age: 82
End: 2017-06-16

## 2017-06-26 ENCOUNTER — OFFICE VISIT (OUTPATIENT)
Dept: INTERNAL MEDICINE CLINIC | Age: 82
End: 2017-06-26

## 2017-06-26 VITALS
SYSTOLIC BLOOD PRESSURE: 120 MMHG | HEIGHT: 66 IN | WEIGHT: 126 LBS | BODY MASS INDEX: 20.25 KG/M2 | TEMPERATURE: 98.3 F | DIASTOLIC BLOOD PRESSURE: 69 MMHG | RESPIRATION RATE: 16 BRPM | HEART RATE: 82 BPM | OXYGEN SATURATION: 98 %

## 2017-06-26 DIAGNOSIS — Z79.01 CHRONIC ANTICOAGULATION: ICD-10-CM

## 2017-06-26 DIAGNOSIS — R25.1 TREMOR: Primary | ICD-10-CM

## 2017-06-26 NOTE — PROGRESS NOTES
Nancy Cho is a 80 y.o. female who presents today for Irregular Heart Beat  . She has a history of   Patient Active Problem List   Diagnosis Code    HTN (hypertension) I10    IBS (irritable bowel syndrome) K58.9    Cataract H26.9    Diverticulosis K57.90    Atrial fibrillation (HCC) I48.91    Chronic cystitis N30.20    Chronic anticoagulation Z79.01    Advanced care planning/counseling discussion Z71.89   . Today patient is here for f/u. Problem visit:  Nancy Cho is here for f/u on MRI. Tremor: noted to be getting a bit worse. Seems to be Intention Tremor. No meds have changed. Mental status stable and doing well. Mild gait issues. Exercise tolerance is stable. Character of problem: worsening. Associated symptoms include: none, but notes that her legs have been getting a bit worse. Treatments tried include: medication not used      ROS  Review of Systems   Constitutional: Negative for chills, fever and weight loss. HENT: Negative. Eyes: Negative for blurred vision, double vision, photophobia and pain. Respiratory: Negative for cough, hemoptysis and sputum production. Cardiovascular: Negative for chest pain, palpitations, orthopnea and claudication. Gastrointestinal: Negative for heartburn, nausea and vomiting. Genitourinary: Negative for dysuria, frequency and urgency. Musculoskeletal: Negative for back pain, myalgias and neck pain. Neurological: Positive for tremors and sensory change (Mild tingling of legs). Negative for dizziness, tingling, speech change and focal weakness. Visit Vitals    /69 (BP 1 Location: Left arm, BP Patient Position: Sitting)    Pulse 82    Temp 98.3 °F (36.8 °C) (Oral)    Resp 16    Ht 5' 6\" (1.676 m)    Wt 126 lb (57.2 kg)    SpO2 98%    BMI 20.34 kg/m2       Physical Exam   Constitutional: She is oriented to person, place, and time. She appears well-developed and well-nourished.    HENT:   Head: Normocephalic and atraumatic. Neck: Normal range of motion. Neck supple. Cardiovascular: Normal rate and regular rhythm. Pulmonary/Chest: Effort normal and breath sounds normal.   Abdominal: Soft. Bowel sounds are normal.   Musculoskeletal: Normal range of motion. She exhibits no edema. Pain to hands due to arthritis. Neurological: She is alert and oriented to person, place, and time. She has normal reflexes. No cranial nerve deficit. Cerebellar testing normal. No obvious tremor. Skin: Skin is warm and dry. Psychiatric: She has a normal mood and affect. Her behavior is normal.         Current Outpatient Prescriptions   Medication Sig    fluticasone (FLONASE) 50 mcg/actuation nasal spray 2 Sprays by Both Nostrils route daily.  nitrofurantoin (MACRODANTIN) 50 mg capsule TAKE ONE CAPSULE BY MOUTH EVERY DAY    lisinopril (PRINIVIL, ZESTRIL) 10 mg tablet TAKE 1 TABLET BY MOUTH EVERY DAY    ALPRAZolam (XANAX) 0.25 mg tablet Take 1 Tab by mouth two (2) times daily as needed for Anxiety. Max Daily Amount: 0.5 mg.  polyethylene glycol (MIRALAX) 17 gram packet Take 1 Packet by mouth daily.  bisoprolol-hydroCHLOROthiazide (ZIAC) 10-6.25 mg per tablet TAKE ONE TABLET BY MOUTH ONCE DAILY    lubiPROStone (AMITIZA) 8 mcg capsule Take 1 Cap by mouth two (2) times daily (with meals). (Patient taking differently: Take 8 mcg by mouth every other day.)    warfarin (COUMADIN) 4 mg tablet Take 1 Tab by mouth every Monday, Wednesday, Friday.  warfarin (COUMADIN) 5 mg tablet Take 1 Tab by mouth every Tuesday, Thursday, Saturday & Sunday.  celecoxib (CELEBREX) 200 mg capsule Take 1 Cap by mouth daily as needed.  amLODIPine (NORVASC) 5 mg tablet Take 1 Tab by mouth daily. No current facility-administered medications for this visit.          Past Medical History:   Diagnosis Date    Diverticula, colon     Hypertension     IBS (irritable bowel syndrome)     Irregular heart beat       Past Surgical History: Procedure Laterality Date    HX  SECTION      X 3    HX COLONOSCOPY  8/28/15    with endoscopy day before    HX HYSTERECTOMY        Social History   Substance Use Topics    Smoking status: Former Smoker    Smokeless tobacco: Never Used    Alcohol use Yes      Family History   Problem Relation Age of Onset    Tuberculosis Mother         Allergies   Allergen Reactions    Pcn [Penicillins] Hives        Assessment/Plan  Les Magallon was seen today for irregular heart beat. Diagnoses and all orders for this visit:    Tremor - no tremor during exam. Pt having some limitation due to this. MRI normal. Will have Neuro evaluate.   -     REFERRAL TO NEUROLOGY    Chronic anticoagulation - INR in 6 weeks. -     PROTHROMBIN TIME + INR;  Future        Follow-up Disposition: Not on File    Lizet Tello MD  2017

## 2017-06-26 NOTE — MR AVS SNAPSHOT
Visit Information Date & Time Provider Department Dept. Phone Encounter #  
 6/26/2017  1:00 PM Adalid Hawkins MD [facility] [phone] [id number] Upcoming Health Maintenance Date Due  
 GLAUCOMA SCREENING Q2Y 4/8/2017 INFLUENZA AGE 9 TO ADULT 8/1/2017 MEDICARE YEARLY EXAM 6/13/2018 DTaP/Tdap/Td series (2 - Td) 5/17/2026 Allergies as of 6/26/2017  Review Complete On: 3/91/0974 By: Kip Peters Severity Noted Reaction Type Reactions Pcn [Penicillins]  10/05/2015    Hives Current Immunizations  Reviewed on 10/5/2015 Name Date Influenza High Dose Vaccine PF 11/1/2016 Influenza Vaccine (Quad) PF 10/5/2015 Pneumococcal Conjugate (PCV-13) 9/1/2015 Pneumococcal Polysaccharide (PPSV-23) 10/1/2002 Tdap 9/1/2015 Zoster Vaccine, Live 6/1/2009 Not reviewed this visit You Were Diagnosed With   
  
 Codes Comments Tremor    -  Primary ICD-10-CM: R25.1 ICD-9-CM: 648. 0 Chronic anticoagulation     ICD-10-CM: Z79.01 
ICD-9-CM: V58.61 Vitals BP Pulse Temp Resp Height(growth percentile) Weight(growth percentile) 120/69 (BP 1 Location: Left arm, BP Patient Position: Sitting) 82 98.3 °F (36.8 °C) (Oral) 16 5' 6\" (1.676 m) 126 lb (57.2 kg) SpO2 BMI OB Status Smoking Status 98% 20.34 kg/m2 Hysterectomy Former Smoker Vitals History BMI and BSA Data Body Mass Index Body Surface Area  
 20.34 kg/m 2 1.63 m 2 Preferred Pharmacy Pharmacy Name Phone [id number] [address] [facility] [phone] Your Updated Medication List  
  
   
This list is accurate as of: 6/26/17  1:28 PM.  Always use your most recent med list.  
  
  
  
  
 ALPRAZolam 0.25 mg tablet Commonly known as:  Massimo Fuse Take 1 Tab by mouth two (2) times daily as needed for Anxiety. Max Daily Amount: 0.5 mg.  
  
 amLODIPine 5 mg tablet Commonly known as:  Berrios Luiz Take 1 Tab by mouth daily. bisoprolol-hydroCHLOROthiazide 10-6.25 mg per tablet Commonly known as:  [facility] TAKE ONE TABLET BY MOUTH ONCE DAILY  
  
 celecoxib 200 mg capsule Commonly known as:  CELEBREX Take 1 Cap by mouth daily as needed. fluticasone 50 mcg/actuation nasal spray Commonly known as:  Alva Obdulio 2 Sprays by Both Nostrils route daily. lisinopril 10 mg tablet Commonly known as:  PRINIVIL, ZESTRIL  
TAKE 1 TABLET BY MOUTH EVERY DAY  
  
 lubiPROStone 8 mcg capsule Commonly known as:  Kimber Grist Take 1 Cap by mouth two (2) times daily (with meals). nitrofurantoin 50 mg capsule Commonly known as:  MACRODANTIN  
TAKE ONE CAPSULE BY MOUTH EVERY DAY  
  
 polyethylene glycol 17 gram packet Commonly known as:  Gunnar Hoops Take 1 Packet by mouth daily. * warfarin 5 mg tablet Commonly known as:  COUMADIN Take 1 Tab by mouth every Tuesday, Thursday, Saturday & Sunday. * warfarin 4 mg tablet Commonly known as:  COUMADIN Take 1 Tab by mouth every Monday, Wednesday, Friday. * Notice: This list has 2 medication(s) that are the same as other medications prescribed for you. Read the directions carefully, and ask your doctor or other care provider to review them with you. We Performed the Following REFERRAL TO NEUROLOGY [JEG31 Custom] To-Do List   
 Around 07/24/2017 Lab:  PROTHROMBIN TIME + INR Referral Information Referral ID Referred By Referred To  
  
 [id number] Tania MARQUEZ MD   
   [address] Phone: [phone] Fax: [phone] Visits Status Start Date End Date 1 New Request 6/26/17 6/26/18 If your referral has a status of pending review or denied, additional information will be sent to support the outcome of this decision. Introducing [facility]! Dear Nai Montenegro: Thank you for requesting a Viki account. Our records indicate that you already have an active Viki account. You can access your account anytime at [email or web address]. Remember The Member/[employer] Did you know that you can access your hospital and ER discharge instructions at any time in Viki? You can also review all of your test results from your hospital stay or ER visit. Additional Information If you have questions, please visit the Frequently Asked Questions section of the Viki website at [email or web address]. Remember The Member/[employer]/. Remember, Viki is NOT to be used for urgent needs. For medical emergencies, dial 911. Now available from your iPhone and Android! Please provide this summary of care documentation to your next provider. Your primary care clinician is listed as Kale Murphy. If you have any questions after today's visit, please call [phone].

## 2017-07-06 ENCOUNTER — OFFICE VISIT (OUTPATIENT)
Dept: NEUROLOGY | Age: 82
End: 2017-07-06

## 2017-07-06 VITALS
BODY MASS INDEX: 20.09 KG/M2 | WEIGHT: 125 LBS | SYSTOLIC BLOOD PRESSURE: 138 MMHG | RESPIRATION RATE: 20 BRPM | DIASTOLIC BLOOD PRESSURE: 68 MMHG | HEIGHT: 66 IN

## 2017-07-06 DIAGNOSIS — E53.8 LOW VITAMIN B12 LEVEL: ICD-10-CM

## 2017-07-06 DIAGNOSIS — I10 ESSENTIAL HYPERTENSION: ICD-10-CM

## 2017-07-06 DIAGNOSIS — R25.1 TREMOR: Primary | ICD-10-CM

## 2017-07-06 RX ORDER — CLONAZEPAM 0.5 MG/1
0.5 TABLET ORAL
Qty: 10 TAB | Refills: 1 | Status: SHIPPED | OUTPATIENT
Start: 2017-07-06 | End: 2018-08-09 | Stop reason: ALTCHOICE

## 2017-07-06 RX ORDER — AMLODIPINE BESYLATE 5 MG/1
5 TABLET ORAL DAILY
Qty: 90 TAB | Refills: 1 | Status: SHIPPED | OUTPATIENT
Start: 2017-07-06 | End: 2018-01-02 | Stop reason: SDUPTHER

## 2017-07-06 NOTE — LETTER
Dear Boris Virk MD, Thank you for allowing me to see your patient, Nicholas Ross for a neurological consultation. Please see my impression and recommendations as outlined in my note. Sincerely, Be Mcclendon MD 
[facility] at [address] record in preparation for visit and have necessary documentation Pt did not bring medication to office visit for review Medication list reviewed and reconciled with patient Information was given to pt on Advanced Directives, Living Will 
opportunity was given for questions NEUROLOGY NEW PATIENT CONSULTATION 
 
REFERRED BY: 
Boris Virk MD 
 
CHIEF COMPLAINT: 
tremor HISTORY OF PRESENT ILLNESS HISTORY PROVIDED BY: 
Patient Family Member: grand daughter Ligia Ross is a 80 y.o. female who I am asked to see in consultation for new onset tremor in the hands. She notes this with reading, getting things out of drawers, etc. She notes that too much activity will bring things on. She also has to be cautious with speech or it will not come out or be jumbled. She has xanax to take as needed. She uses this very sparingly. When she went to the movies she took 1/2 of a 0.25mg tab and she didn't do well. When she has speech issues she knows what she wants to say but can't get it out. She feels calm when she takes the xanax. She does have IBS and has to be cautious with what she eats. She has had to stay home a lot more due to this. She has no resting tremor. She only has the tremor with action. She lives with her family and gets concerned with driving to [employer] on [address]. She has no history of stroke. She does have stroke risk factors of afib and HTN. She has only had two mechanical falls and has some balance issues. Her walking has slowed down due to knee pain. She is on coumadin and doing well with this.  
MRI brain was done and was normal.  
 She did have recent TSH that was fine. Memory is stable. She does make lists to help her remember what to get at the store. Recall is slightly slow but nothing unusual. 
 
 
PMH Past Medical History:  
Diagnosis Date  Diverticula, colon  Hypertension  IBS (irritable bowel syndrome)  Irregular heart beat 31 [address] Social History Social History  Marital status:  Spouse name: N/A  
 Number of children: N/A  
 Years of education: N/A Social History Main Topics  Smoking status: Former Smoker  Smokeless tobacco: Never Used  Alcohol use Yes  Drug use: Yes  Sexual activity: No  
 
Other Topics Concern  Not on file Social History Narrative [facility] Family History Problem Relation Age of Onset  Tuberculosis Mother ALLERGIES Allergies Allergen Reactions  Pcn [Penicillins] Hives CURRENT MEDS Current Outpatient Prescriptions Medication Sig Dispense Refill  fluticasone (FLONASE) 50 mcg/actuation nasal spray 2 Sprays by Both Nostrils route daily. 1 Bottle 6  
 nitrofurantoin (MACRODANTIN) 50 mg capsule TAKE ONE CAPSULE BY MOUTH EVERY DAY 90 Cap 1  
 lisinopril (PRINIVIL, ZESTRIL) 10 mg tablet TAKE 1 TABLET BY MOUTH EVERY DAY 90 Tab 1  ALPRAZolam (XANAX) 0.25 mg tablet Take 1 Tab by mouth two (2) times daily as needed for Anxiety. Max Daily Amount: 0.5 mg. 30 Tab 0  
 polyethylene glycol (MIRALAX) 17 gram packet Take 1 Packet by mouth daily. 100 Packet 1  
 bisoprolol-hydroCHLOROthiazide (ZIAC) 10-6.25 mg per tablet TAKE ONE TABLET BY MOUTH ONCE DAILY 90 Tab 1  
 lubiPROStone (AMITIZA) 8 mcg capsule Take 1 Cap by mouth two (2) times daily (with meals). (Patient taking differently: Take 8 mcg by mouth every other day.) 60 Cap 3  warfarin (COUMADIN) 4 mg tablet Take 1 Tab by mouth every Monday, Wednesday, Friday. 30 Tab 5  warfarin (COUMADIN) 5 mg tablet Take 1 Tab by mouth every Tuesday, Thursday, Saturday & Sunday. 30 Tab 3  celecoxib (CELEBREX) 200 mg capsule Take 1 Cap by mouth daily as needed. 30 Cap 4  
 amLODIPine (NORVASC) 5 mg tablet Take 1 Tab by mouth daily. 30 Tab 3 REVIEW OF SYSTEMS:  
 
Y  N       Y  N  Y  N   Y  N 
  AIDS            Falls    Memory Loss     Shortness of breath Anxiety            Fatigue   Muscle Pain           Skipped beats Chest Pain     Frequent HA   Ms Weakness        Snoring Constipation  Hearing loss   Nause/Vomiting     Stomach Pain Cough           Hepatitis   Neuropathy            Swallowing difficulty Depression   Incontinence   Poor appetite         Vertigo Diarrhea         Joint Pain   Rash                      Visual disturbances Fainting          Leg Swelling   Ringing ears          Weight changes Unable to obtain  ROS due to  mental status change  sedated   intubated PREVIOUS WORKUP IMAGING: MRI brain: Small vessel disease LABS Results for orders placed or performed in visit on 06/12/17 TSH 3RD GENERATION Result Value Ref Range TSH 0.595 0.450 - 4.500 uIU/mL METABOLIC PANEL, COMPREHENSIVE Result Value Ref Range Glucose 104 (H) 65 - 99 mg/dL BUN 16 8 - 27 mg/dL Creatinine 0.70 0.57 - 1.00 mg/dL GFR est non-AA 78 >59 mL/min/1.73 GFR est AA 89 >59 mL/min/1.73  
 BUN/Creatinine ratio 23 12 - 28 Sodium 141 134 - 144 mmol/L Potassium 4.4 3.5 - 5.2 mmol/L Chloride 97 96 - 106 mmol/L  
 CO2 26 18 - 29 mmol/L Calcium 9.2 8.7 - 10.3 mg/dL Protein, total 6.8 6.0 - 8.5 g/dL Albumin 4.4 3.5 - 4.7 g/dL GLOBULIN, TOTAL 2.4 1.5 - 4.5 g/dL A-G Ratio 1.8 1.2 - 2.2 Bilirubin, total 0.9 0.0 - 1.2 mg/dL Alk. phosphatase 110 39 - 117 IU/L  
 AST (SGOT) 21 0 - 40 IU/L  
 ALT (SGPT) 16 0 - 32 IU/L  
CBC WITH AUTOMATED DIFF Result Value Ref Range WBC 5.5 3.4 - 10.8 x10E3/uL  
 RBC 4.48 3.77 - 5.28 x10E6/uL HGB 14.8 11.1 - 15.9 g/dL HCT 43.3 34.0 - 46.6 % MCV 97 79 - 97 fL  
 MCH 33.0 26.6 - 33.0 pg  
 MCHC 34.2 31.5 - 35.7 g/dL  
 RDW 14.3 12.3 - 15.4 % PLATELET 203 687 - 050 x10E3/uL NEUTROPHILS 64 % Lymphocytes 24 % MONOCYTES 9 % EOSINOPHILS 2 % BASOPHILS 1 %  
 ABS. NEUTROPHILS 3.5 1.4 - 7.0 x10E3/uL Abs Lymphocytes 1.3 0.7 - 3.1 x10E3/uL  
 ABS. MONOCYTES 0.5 0.1 - 0.9 x10E3/uL  
 ABS. EOSINOPHILS 0.1 0.0 - 0.4 x10E3/uL  
 ABS. BASOPHILS 0.1 0.0 - 0.2 x10E3/uL IMMATURE GRANULOCYTES 0 %  
 ABS. IMM. GRANS. 0.0 0.0 - 0.1 x10E3/uL PROTHROMBIN TIME + INR Result Value Ref Range INR 2.4 (H) 0.8 - 1.2 Prothrombin time 24.1 (H) 9.1 - 12.0 sec PHYSICAL EXAM 
Visit Vitals  /68  Resp 20  
 Ht 5' 6\" (1.676 m)  Wt 56.7 kg (125 lb)  BMI 20.18 kg/m2 General:  Alert, cooperative, no distress. Head:  Normocephalic, without obvious abnormality, atraumatic. Eyes:  Conjunctivae/corneas clear. Pupils equal, round, reactive to light. Extraocular movements intact, VFF, NO papilledema Lungs: 
Heart:   Non labored breathing Regular rate and rhythm, no carotid bruits Abdomen:   Soft, non-distended Extremities: Extremities normal, atraumatic, no cyanosis or edema. Pulses: 2+ and symmetric all extremities. Skin: Skin color, texture, turgor normal. No rashes or lesions. Neurologic:  Gen: Attention normal 
           Language: naming, repetition, fluency normal 
           Memory: intact recent and remote memory Cranial Nerves: 
I: smell Not tested II: visual fields Full to confrontation II: pupils Equal, round, reactive to light II: optic disc No papilledema III,VII: ptosis none III,IV,VI: extraocular muscles  Full ROM V: mastication normal  
V: facial light touch sensation  normal  
VII: facial muscle function   symmetric VIII: hearing symmetric IX: soft palate elevation  normal  
XI: trapezius strength  5/5 XI: sternocleidomastoid strength 5/5 XI: neck flexion strength  5/5  
 XII: tongue  midline Motor: normal bulk and tone, slight tremor with writing Strength: 5/5 all four extremities Sensory: intact to LT, PP, vibration, and temperature Coordination: FTN intact, Rhomberg negative Gait: normal gait but unable to tandem Reflexes: 2+ throughout IMPRESSION Praveena Adams is a 80 y.o. female who presents for evaluation of tremor. Patient had a new onset of tremor several months ago. MRI of the brain was negative. Some of the tremor labs were done and were negative. Will repeat the rest.  On exam today patient's tremor is most consistent with essential tremor. I do not see signs of Parkinson's disease at this point. Given patient's in frequency of the tremor, she does not want to do daily medication at this point. We did discuss this option. We will try Klonopin for an as needed medication option to see if she does better with this than Xanax. RECOMMENDATIONS 1. Tremor labs 2. MRI of the brain negative 3. Try Klonopin half tab of 0.5 mg as needed for tremor. Side effects discussed 4. Discussed possibly trying primidone or propranolol in the future 5. Continue warfarin for stroke prevention 6. Discussed essential tremor versus Parkinson's disease FU 5 months Dina Marcus MD 
 
CC: Mendy Field MD 
Fax: [phone] This note was created using voice recognition software. Despite editing, there may be syntax errors. This note will not be viewable in [address]

## 2017-07-06 NOTE — PROGRESS NOTES
NEUROLOGY NEW PATIENT CONSULTATION    REFERRED BY:  Gabe Orr MD    CHIEF COMPLAINT:  tremor    HISTORY OF PRESENT ILLNESS    HISTORY PROVIDED BY:  Patient  Family Member: grand daughter Lizzie Whitmore is a 80 y.o. female who I am asked to see in consultation for new onset tremor in the hands. She notes this with reading, getting things out of drawers, etc. She notes that too much activity will bring things on. She also has to be cautious with speech or it will not come out or be jumbled. She has xanax to take as needed. She uses this very sparingly. When she went to the movies she took 1/2 of a 0.25mg tab and she didn't do well. When she has speech issues she knows what she wants to say but can't get it out. She feels calm when she takes the xanax. She does have IBS and has to be cautious with what she eats. She has had to stay home a lot more due to this. She has no resting tremor. She only has the tremor with action. She lives with her family and gets concerned with driving to [employer] on [address]. She has no history of stroke. She does have stroke risk factors of afib and HTN. She has only had two mechanical falls and has some balance issues. Her walking has slowed down due to knee pain. She is on coumadin and doing well with this. MRI brain was done and was normal.   She did have recent TSH that was fine. Memory is stable. She does make lists to help her remember what to get at the store. Recall is slightly slow but nothing unusual.      PMH  Past Medical History:   Diagnosis Date    Diverticula, colon     Hypertension     IBS (irritable bowel syndrome)     Irregular heart beat        SH  Social History     Social History    Marital status:      Spouse name: N/A    Number of children: N/A    Years of education: N/A     Social History Main Topics    Smoking status: Former Smoker    Smokeless tobacco: Never Used    Alcohol use Yes    Drug use:  Yes    Sexual activity: No     Other Topics Concern    Not on file     Social History Narrative       FH  Family History   Problem Relation Age of Onset    Tuberculosis Mother        ALLERGIES  Allergies   Allergen Reactions    Pcn [Penicillins] Hives       CURRENT MEDS  Current Outpatient Prescriptions   Medication Sig Dispense Refill    fluticasone (FLONASE) 50 mcg/actuation nasal spray 2 Sprays by Both Nostrils route daily. 1 Bottle 6    nitrofurantoin (MACRODANTIN) 50 mg capsule TAKE ONE CAPSULE BY MOUTH EVERY DAY 90 Cap 1    lisinopril (PRINIVIL, ZESTRIL) 10 mg tablet TAKE 1 TABLET BY MOUTH EVERY DAY 90 Tab 1    ALPRAZolam (XANAX) 0.25 mg tablet Take 1 Tab by mouth two (2) times daily as needed for Anxiety. Max Daily Amount: 0.5 mg. 30 Tab 0    polyethylene glycol (MIRALAX) 17 gram packet Take 1 Packet by mouth daily. 100 Packet 1    bisoprolol-hydroCHLOROthiazide (ZIAC) 10-6.25 mg per tablet TAKE ONE TABLET BY MOUTH ONCE DAILY 90 Tab 1    lubiPROStone (AMITIZA) 8 mcg capsule Take 1 Cap by mouth two (2) times daily (with meals). (Patient taking differently: Take 8 mcg by mouth every other day.) 60 Cap 3    warfarin (COUMADIN) 4 mg tablet Take 1 Tab by mouth every Monday, Wednesday, Friday. 30 Tab 5    warfarin (COUMADIN) 5 mg tablet Take 1 Tab by mouth every Tuesday, Thursday, Saturday & Sunday. 30 Tab 3    celecoxib (CELEBREX) 200 mg capsule Take 1 Cap by mouth daily as needed. 30 Cap 4    amLODIPine (NORVASC) 5 mg tablet Take 1 Tab by mouth daily.  30 Tab 3       REVIEW OF SYSTEMS:     Y  N       Y  N  Y  N   Y  N  [] [x] AIDS          [x] [] Falls  [] [x] Memory Loss  [] [x]  Shortness of breath  [x] [] Anxiety          [] [x] Fatigue [] [x] Muscle Pain        [x] []  Skipped beats  [] [x] Chest Pain   [] [x] Frequent HA [] [x] Ms Weakness     [] [x]  Snoring  [x] [] Constipation [x] [x]Hearing loss [] [x] Nause/Vomiting  [] [x]  Stomach Pain  [] [x] Cough          [] [x]Hepatitis [] [x] Neuropathy [] [x]  Swallowing difficulty  [] [x] Depression  [] [x]Incontinence [] [x] Poor appetite      [x] []  Vertigo  [] [x] Diarrhea       [] [x] Joint Pain [] [x] Rash                   [] [x]  Visual disturbances  [] [x] Fainting        [] [x] Leg Swelling [] [x] Ringing ears       [] [x]  Weight changes      []Unable to obtain  ROS due to  []mental status change  []sedated   []intubated          PREVIOUS WORKUP  IMAGING: MRI brain: Small vessel disease      LABS  Results for orders placed or performed in visit on 06/12/17   TSH 3RD GENERATION   Result Value Ref Range    TSH 0.595 0.450 - 4.974 uIU/mL   METABOLIC PANEL, COMPREHENSIVE   Result Value Ref Range    Glucose 104 (H) 65 - 99 mg/dL    BUN 16 8 - 27 mg/dL    Creatinine 0.70 0.57 - 1.00 mg/dL    GFR est non-AA 78 >59 mL/min/1.73    GFR est AA 89 >59 mL/min/1.73    BUN/Creatinine ratio 23 12 - 28    Sodium 141 134 - 144 mmol/L    Potassium 4.4 3.5 - 5.2 mmol/L    Chloride 97 96 - 106 mmol/L    CO2 26 18 - 29 mmol/L    Calcium 9.2 8.7 - 10.3 mg/dL    Protein, total 6.8 6.0 - 8.5 g/dL    Albumin 4.4 3.5 - 4.7 g/dL    GLOBULIN, TOTAL 2.4 1.5 - 4.5 g/dL    A-G Ratio 1.8 1.2 - 2.2    Bilirubin, total 0.9 0.0 - 1.2 mg/dL    Alk. phosphatase 110 39 - 117 IU/L    AST (SGOT) 21 0 - 40 IU/L    ALT (SGPT) 16 0 - 32 IU/L   CBC WITH AUTOMATED DIFF   Result Value Ref Range    WBC 5.5 3.4 - 10.8 x10E3/uL    RBC 4.48 3.77 - 5.28 x10E6/uL    HGB 14.8 11.1 - 15.9 g/dL    HCT 43.3 34.0 - 46.6 %    MCV 97 79 - 97 fL    MCH 33.0 26.6 - 33.0 pg    MCHC 34.2 31.5 - 35.7 g/dL    RDW 14.3 12.3 - 15.4 %    PLATELET 617 693 - 148 x10E3/uL    NEUTROPHILS 64 %    Lymphocytes 24 %    MONOCYTES 9 %    EOSINOPHILS 2 %    BASOPHILS 1 %    ABS. NEUTROPHILS 3.5 1.4 - 7.0 x10E3/uL    Abs Lymphocytes 1.3 0.7 - 3.1 x10E3/uL    ABS. MONOCYTES 0.5 0.1 - 0.9 x10E3/uL    ABS. EOSINOPHILS 0.1 0.0 - 0.4 x10E3/uL    ABS. BASOPHILS 0.1 0.0 - 0.2 x10E3/uL    IMMATURE GRANULOCYTES 0 %    ABS. IMM. GRANS. 0.0 0.0 - 0.1 x10E3/uL   PROTHROMBIN TIME + INR   Result Value Ref Range    INR 2.4 (H) 0.8 - 1.2    Prothrombin time 24.1 (H) 9.1 - 12.0 sec       PHYSICAL EXAM  Visit Vitals    /68    Resp 20    Ht 5' 6\" (1.676 m)    Wt 56.7 kg (125 lb)    BMI 20.18 kg/m2     General:  Alert, cooperative, no distress. Head:  Normocephalic, without obvious abnormality, atraumatic. Eyes:  Conjunctivae/corneas clear. Pupils equal, round, reactive to light. Extraocular movements intact, VFF, NO papilledema   Lungs:  Heart:   Non labored breathing  Regular rate and rhythm, no carotid bruits   Abdomen:   Soft, non-distended   Extremities: Extremities normal, atraumatic, no cyanosis or edema. Pulses: 2+ and symmetric all extremities. Skin: Skin color, texture, turgor normal. No rashes or lesions. Neurologic:  Gen: Attention normal             Language: naming, repetition, fluency normal             Memory: intact recent and remote memory  Cranial Nerves:  I: smell Not tested   II: visual fields Full to confrontation   II: pupils Equal, round, reactive to light   II: optic disc No papilledema   III,VII: ptosis none   III,IV,VI: extraocular muscles  Full ROM   V: mastication normal   V: facial light touch sensation  normal   VII: facial muscle function   symmetric   VIII: hearing symmetric   IX: soft palate elevation  normal   XI: trapezius strength  5/5   XI: sternocleidomastoid strength 5/5   XI: neck flexion strength  5/5   XII: tongue  midline     Motor: normal bulk and tone, slight tremor with writing              Strength: 5/5 all four extremities  Sensory: intact to LT, PP, vibration, and temperature  Coordination: FTN intact, Rhomberg negative  Gait: normal gait but unable to tandem  Reflexes: 2+ throughout       57 Josiane Elder is a 80 y.o. female who presents for evaluation of tremor. Patient had a new onset of tremor several months ago. MRI of the brain was negative.   Some of the tremor labs were done and were negative. Will repeat the rest.  On exam today patient's tremor is most consistent with essential tremor. I do not see signs of Parkinson's disease at this point. Given patient's in frequency of the tremor, she does not want to do daily medication at this point. We did discuss this option. We will try Klonopin for an as needed medication option to see if she does better with this than Xanax. RECOMMENDATIONS  1. Tremor labs  2. MRI of the brain negative  3. Try Klonopin half tab of 0.5 mg as needed for tremor. Side effects discussed  4. Discussed possibly trying primidone or propranolol in the future  5. Continue warfarin for stroke prevention  6. Discussed essential tremor versus Parkinson's disease    FU 5 months    Sam Iglesias MD    CC: Williams Constantino MD  Fax: [phone]    This note was created using voice recognition software. Despite editing, there may be syntax errors. This note will not be viewable in [address]

## 2017-07-06 NOTE — PATIENT INSTRUCTIONS
Learning About Living Cindy  What is a living will? A living will is a legal form you use to write down the kind of care you want at the end of your life. It is used by the health professionals who will treat you if you aren't able to decide for yourself. If you put your wishes in writing, your loved ones and others will know what kind of care you want. They won't need to guess. This can ease your mind and be helpful to others. A living will is not the same as an estate or property will. An estate will explains what you want to happen with your money and property after you die. Is a living will a legal document? A living will is a legal document. Each state has its own laws about living cervantes. If you move to another state, make sure that your living will is legal in the state where you now live. Or you might use a universal form that has been approved by many states. This kind of form can sometimes be completed and stored online. Your electronic copy will then be available wherever you have a connection to the Internet. In most cases, doctors will respect your wishes even if you have a form from a different state. · You don't need an  to complete a living will. But legal advice can be helpful if your state's laws are unclear, your health history is complicated, or your family can't agree on what should be in your living will. · You can change your living will at any time. Some people find that their wishes about end-of-life care change as their health changes. · In addition to making a living will, think about completing a medical power of  form. This form lets you name the person you want to make end-of-life treatment decisions for you (your \"health care agent\") if you're not able to. Many hospitals and nursing homes will give you the forms you need to complete a living will and a medical power of .   · Your living will is used only if you can't make or communicate decisions for yourself anymore. If you become able to make decisions again, you can accept or refuse any treatment, no matter what you wrote in your living will. · Your state may offer an online registry. This is a place where you can store your living will online so the doctors and nurses who need to treat you can find it right away. What should you think about when creating a living will? Talk about your end-of-life wishes with your family members and your doctor. Let them know what you want. That way the people making decisions for you won't be surprised by your choices. Think about these questions as you make your living will:  · Do you know enough about life support methods that might be used? If not, talk to your doctor so you know what might be done if you can't breathe on your own, your heart stops, or you're unable to swallow. · What things would you still want to be able to do after you receive life-support methods? Would you want to be able to walk? To speak? To eat on your own? To live without the help of machines? · If you have a choice, where do you want to be cared for? In your home? At a hospital or nursing home? · Do you want certain Temple practices performed if you become very ill? · If you have a choice at the end of your life, where would you prefer to die? At home? In a hospital or nursing home? Somewhere else? · Would you prefer to be buried or cremated? · Do you want your organs to be donated after you die? What should you do with your living will? · Make sure that your family members and your health care agent have copies of your living will. · Give your doctor a copy of your living will to keep in your medical record. If you have more than one doctor, make sure that each one has a copy. · You may want to put a copy of your living will where it can be easily found. Where can you learn more? Go to http://giuseppe-abi.info/.   Enter W261 in the search box to learn more about \"Learning About Living Cindy. \"  Current as of: August 8, 2016  Content Version: 11.3  © 4546-3117 [employer]. Care instructions adapted under license by [employer] (which disclaims liability or warranty for this information). If you have questions about a medical condition or this instruction, always ask your healthcare professional. [address] any warranty or liability for your use of this information. Advance Directives: Care Instructions  Your Care Instructions  An advance directive is a legal way to state your wishes at the end of your life. It tells your family and your doctor what to do if you can no longer say what you want. There are two main types of advance directives. You can change them any time that your wishes change. · A living will tells your family and your doctor your wishes about life support and other treatment. · A durable power of  for health care lets you name a person to make treatment decisions for you when you can't speak for yourself. This person is called a health care agent. If you do not have an advance directive, decisions about your medical care may be made by a doctor or a  who doesn't know you. It may help to think of an advance directive as a gift to the people who care for you. If you have one, they won't have to make tough decisions by themselves. Follow-up care is a key part of your treatment and safety. Be sure to make and go to all appointments, and call your doctor if you are having problems. It's also a good idea to know your test results and keep a list of the medicines you take. How can you care for yourself at home? · Discuss your wishes with your loved ones and your doctor. This way, there are no surprises. · Many states have a unique form. Or you might use a universal form that has been approved by many states. This kind of form can sometimes be completed and stored online.  Your electronic copy will then be available wherever you have a connection to the Internet. In most cases, doctors will respect your wishes even if you have a form from a different state. · You don't need a  to do an advance directive. But you may want to get legal advice. · Think about these questions when you prepare an advance directive:  ¨ Who do you want to make decisions about your medical care if you are not able to? Many people choose a family member or close friend. ¨ Do you know enough about life support methods that might be used? If not, talk to your doctor so you understand. ¨ What are you most afraid of that might happen? You might be afraid of having pain, losing your independence, or being kept alive by machines. ¨ Where would you prefer to die? Choices include your home, a hospital, or a nursing home. ¨ Would you like to have information about hospice care to support you and your family? ¨ Do you want to donate organs when you die? ¨ Do you want certain Muslim practices performed before you die? If so, put your wishes in the advance directive. · Read your advance directive every year, and make changes as needed. When should you call for help? Be sure to contact your doctor if you have any questions. Where can you learn more? Go to http://giuseppe-abi.info/. Enter R264 in the search box to learn more about \"Advance Directives: Care Instructions. \"  Current as of: November 17, 2016  Content Version: 11.3  © 8055-0994 [employer]. Care instructions adapted under license by [employer] (which disclaims liability or warranty for this information). If you have questions about a medical condition or this instruction, always ask your healthcare professional. [place] [address] any warranty or liability for your use of this information.   Patient Instructions/Plans:  · We could try primidone or propranolol  · We will try klonopin Benign Essential Tremor: Care Instructions  Your Care Instructions  Benign essential tremor is a medical term for shaking that you can't control. Your hand or fingers may shake when you lift a cup or point at something. Or your voice may shake when you speak. This type of tremor is not harmful. It is not caused by a stroke or Parkinson's disease. Some things can affect how much you shake. For example, drinking or eating something with caffeine may make tremors worse for a while. Some medicines also can increase tremors. These include antidepressants and too much thyroid replacement. Talk to your doctor if you think one of your medicines makes your tremors worse. If you are self-conscious about your tremors, there are some things you can do to reduce them or make them less noticeable. This includes taking medicine. Follow-up care is a key part of your treatment and safety. Be sure to make and go to all appointments, and call your doctor if you are having problems. It's also a good idea to know your test results and keep a list of the medicines you take. How can you care for yourself at home? · Take your medicines exactly as prescribed. Call your doctor if you think you are having a problem with your medicine. Some medicines that help control tremors have to be taken every day, even if you are not having tremors. You will get more details on the specific medicines your doctor prescribes. · Get plenty of rest.  · Eat a balanced, healthy diet. · Try to reduce stress. Regular exercise and massages may help. · Limit alcohol. Heavy drinking can make your tremors worse. · Avoid drinks or foods with caffeine if they make your tremors worse. These include tea, cola, coffee, and chocolate. · Wear a heavy bracelet or watch. This adds a little weight to your hand. The extra weight may reduce tremors. · Drink from cups or glasses that are only half full. You may also want to try drinking with a straw.   When should you call for help? Watch closely for changes in your health, and be sure to contact your doctor if:  · You notice your tremors are getting worse. · You can't do your everyday activities because of your tremors. · You are sad and embarrassed about your shaking. Where can you learn more? Go to http://giuseppe-abi.info/. Enter B746 in the search box to learn more about \"Benign Essential Tremor: Care Instructions. \"  Current as of: October 14, 2016  Content Version: 11.3  © 7998-2478 [employer]. Care instructions adapted under license by [employer] (which disclaims liability or warranty for this information). If you have questions about a medical condition or this instruction, always ask your healthcare professional. [address] any warranty or liability for your use of this information.

## 2017-07-06 NOTE — MR AVS SNAPSHOT
Visit Information Date & Time Provider Department Dept. Phone Encounter #  
 7/6/2017  2:00 PM Edward Claros MD [facility] [phone] [id number] Upcoming Health Maintenance Date Due  
 GLAUCOMA SCREENING Q2Y 4/8/2017 INFLUENZA AGE 9 TO ADULT 8/1/2017 MEDICARE YEARLY EXAM 6/13/2018 DTaP/Tdap/Td series (2 - Td) 5/17/2026 Allergies as of 7/6/2017  Review Complete On: 7/6/2017 By: Abigail Marti LPN Severity Noted Reaction Type Reactions Pcn [Penicillins]  10/05/2015    Hives Current Immunizations  Reviewed on 10/5/2015 Name Date Influenza High Dose Vaccine PF 11/1/2016 Influenza Vaccine (Quad) PF 10/5/2015 Pneumococcal Conjugate (PCV-13) 9/1/2015 Pneumococcal Polysaccharide (PPSV-23) 10/1/2002 Tdap 9/1/2015 Zoster Vaccine, Live 6/1/2009 Not reviewed this visit You Were Diagnosed With   
  
 Codes Comments Tremor    -  Primary ICD-10-CM: R25.1 ICD-9-CM: 781.0 Low vitamin B12 level     ICD-10-CM: E53.8 ICD-9-CM: 266.2 Vitals BP Resp Height(growth percentile) Weight(growth percentile) BMI OB Status 138/68 20 5' 6\" (1.676 m) 125 lb (56.7 kg) 20.18 kg/m2 Hysterectomy Smoking Status Former Smoker Vitals History BMI and BSA Data Body Mass Index Body Surface Area  
 20.18 kg/m 2 1.62 m 2 Preferred Pharmacy Pharmacy Name Phone [id number] [place], [address] AT [facility] [phone] Your Updated Medication List  
  
   
This list is accurate as of: 7/6/17  3:05 PM.  Always use your most recent med list.  
  
  
  
  
 ALPRAZolam 0.25 mg tablet Commonly known as:  Donata Fess Take 1 Tab by mouth two (2) times daily as needed for Anxiety. Max Daily Amount: 0.5 mg.  
  
 amLODIPine 5 mg tablet Commonly known as:  Ap Rings Take 1 Tab by mouth daily. bisoprolol-hydroCHLOROthiazide 10-6.25 mg per tablet Commonly known as:  [facility] TAKE ONE TABLET BY MOUTH ONCE DAILY  
  
 celecoxib 200 mg capsule Commonly known as:  CELEBREX Take 1 Cap by mouth daily as needed. clonazePAM 0.5 mg tablet Commonly known as:  Erven Level Take 1 Tab by mouth daily as needed. Indications: ESSENTIAL TREMOR  
  
 fluticasone 50 mcg/actuation nasal spray Commonly known as:  Wenda Maze 2 Sprays by Both Nostrils route daily. lisinopril 10 mg tablet Commonly known as:  PRINIVIL, ZESTRIL  
TAKE 1 TABLET BY MOUTH EVERY DAY  
  
 lubiPROStone 8 mcg capsule Commonly known as:  Ray Paco Take 1 Cap by mouth two (2) times daily (with meals). nitrofurantoin 50 mg capsule Commonly known as:  MACRODANTIN  
TAKE ONE CAPSULE BY MOUTH EVERY DAY  
  
 polyethylene glycol 17 gram packet Commonly known as:  Richter Lies Take 1 Packet by mouth daily. * warfarin 5 mg tablet Commonly known as:  COUMADIN Take 1 Tab by mouth every Tuesday, Thursday, Saturday & Sunday. * warfarin 4 mg tablet Commonly known as:  COUMADIN Take 1 Tab by mouth every Monday, Wednesday, Friday. * Notice: This list has 2 medication(s) that are the same as other medications prescribed for you. Read the directions carefully, and ask your doctor or other care provider to review them with you. Prescriptions Printed Refills  
 clonazePAM (KLONOPIN) 0.5 mg tablet 1 Sig: Take 1 Tab by mouth daily as needed. Indications: ESSENTIAL TREMOR Class: Print Route: Oral  
  
We Performed the Following AMMONIA [id number] CPT(R)] VITAMIN B12 & FOLATE [01353 CPT(R)] Patient Instructions Keith Perez 1721 What is a living will? A living will is a legal form you use to write down the kind of care you want at the end of your life. It is used by the health professionals who will treat you if you aren't able to decide for yourself. If you put your wishes in writing, your loved ones and others will know what kind of care you want. They won't need to guess. This can ease your mind and be helpful to others. A living will is not the same as an estate or property will. An estate will explains what you want to happen with your money and property after you die. Is a living will a legal document? A living will is a legal document. Each state has its own laws about living cervantes. If you move to another state, make sure that your living will is legal in the state where you now live. Or you might use a universal form that has been approved by many states. This kind of form can sometimes be completed and stored online. Your electronic copy will then be available wherever you have a connection to the Internet. In most cases, doctors will respect your wishes even if you have a form from a different state. · You don't need an  to complete a living will. But legal advice can be helpful if your state's laws are unclear, your health history is complicated, or your family can't agree on what should be in your living will. · You can change your living will at any time. Some people find that their wishes about end-of-life care change as their health changes. · In addition to making a living will, think about completing a medical power of  form. This form lets you name the person you want to make end-of-life treatment decisions for you (your \"health care agent\") if you're not able to. Many hospitals and nursing homes will give you the forms you need to complete a living will and a medical power of . · Your living will is used only if you can't make or communicate decisions for yourself anymore. If you become able to make decisions again, you can accept or refuse any treatment, no matter what you wrote in your living will. · Your state may offer an online registry.  This is a place where you can store your living will online so the doctors and nurses who need to treat you can find it right away. What should you think about when creating a living will? Talk about your end-of-life wishes with your family members and your doctor. Let them know what you want. That way the people making decisions for you won't be surprised by your choices. Think about these questions as you make your living will: · Do you know enough about life support methods that might be used? If not, talk to your doctor so you know what might be done if you can't breathe on your own, your heart stops, or you're unable to swallow. · What things would you still want to be able to do after you receive life-support methods? Would you want to be able to walk? To speak? To eat on your own? To live without the help of machines? · If you have a choice, where do you want to be cared for? In your home? At a hospital or nursing home? · Do you want certain Sikh practices performed if you become very ill? · If you have a choice at the end of your life, where would you prefer to die? At home? In a hospital or nursing home? Somewhere else? · Would you prefer to be buried or cremated? · Do you want your organs to be donated after you die? What should you do with your living will? · Make sure that your family members and your health care agent have copies of your living will. · Give your doctor a copy of your living will to keep in your medical record. If you have more than one doctor, make sure that each one has a copy. · You may want to put a copy of your living will where it can be easily found. Where can you learn more? Go to http://giuseppe-abi.info/. Enter I474 in the search box to learn more about \"Learning About Living Perroy. \" Current as of: August 8, 2016 Content Version: 11.3 © 0589-2831 [employer] ([employer]) [employer], Incorporated.  Care instructions adapted under license by [address] (which disclaims liability or warranty for this information). If you have questions about a medical condition or this instruction, always ask your healthcare professional. [address] any warranty or liability for your use of this information. Advance Directives: Care Instructions Your Care Instructions An advance directive is a legal way to state your wishes at the end of your life. It tells your family and your doctor what to do if you can no longer say what you want. There are two main types of advance directives. You can change them any time that your wishes change. · A living will tells your family and your doctor your wishes about life support and other treatment. · A durable power of  for health care lets you name a person to make treatment decisions for you when you can't speak for yourself. This person is called a health care agent. If you do not have an advance directive, decisions about your medical care may be made by a doctor or a  who doesn't know you. It may help to think of an advance directive as a gift to the people who care for you. If you have one, they won't have to make tough decisions by themselves. Follow-up care is a key part of your treatment and safety. Be sure to make and go to all appointments, and call your doctor if you are having problems. It's also a good idea to know your test results and keep a list of the medicines you take. How can you care for yourself at home? · Discuss your wishes with your loved ones and your doctor. This way, there are no surprises. · Many states have a unique form. Or you might use a universal form that has been approved by many states. This kind of form can sometimes be completed and stored online. Your electronic copy will then be available wherever you have a connection to the Internet.  In most cases, doctors will respect your wishes even if you have a form from a different state. · You don't need a  to do an advance directive. But you may want to get legal advice. · Think about these questions when you prepare an advance directive: ¨ Who do you want to make decisions about your medical care if you are not able to? Many people choose a family member or close friend. ¨ Do you know enough about life support methods that might be used? If not, talk to your doctor so you understand. ¨ What are you most afraid of that might happen? You might be afraid of having pain, losing your independence, or being kept alive by machines. ¨ Where would you prefer to die? Choices include your home, a hospital, or a nursing home. ¨ Would you like to have information about hospice care to support you and your family? ¨ Do you want to donate organs when you die? ¨ Do you want certain Gnosticism practices performed before you die? If so, put your wishes in the advance directive. · Read your advance directive every year, and make changes as needed. When should you call for help? Be sure to contact your doctor if you have any questions. Where can you learn more? Go to http://giuseppe-abi.info/. Enter R264 in the search box to learn more about \"Advance Directives: Care Instructions. \" Current as of: November 17, 2016 Content Version: 11.3 © 3528-6763 Healthwise, Incorporated. Care instructions adapted under license by [employer] (which disclaims liability or warranty for this information). If you have questions about a medical condition or this instruction, always ask your healthcare professional. [place] [address] any warranty or liability for your use of this information. Patient Instructions/Plans: · We could try primidone or propranolol · We will try klonopin Benign Essential Tremor: Care Instructions Your Care Instructions Benign essential tremor is a medical term for shaking that you can't control. Your hand or fingers may shake when you lift a cup or point at something. Or your voice may shake when you speak. This type of tremor is not harmful. It is not caused by a stroke or Parkinson's disease. Some things can affect how much you shake. For example, drinking or eating something with caffeine may make tremors worse for a while. Some medicines also can increase tremors. These include antidepressants and too much thyroid replacement. Talk to your doctor if you think one of your medicines makes your tremors worse. If you are self-conscious about your tremors, there are some things you can do to reduce them or make them less noticeable. This includes taking medicine. Follow-up care is a key part of your treatment and safety. Be sure to make and go to all appointments, and call your doctor if you are having problems. It's also a good idea to know your test results and keep a list of the medicines you take. How can you care for yourself at home? · Take your medicines exactly as prescribed. Call your doctor if you think you are having a problem with your medicine. Some medicines that help control tremors have to be taken every day, even if you are not having tremors. You will get more details on the specific medicines your doctor prescribes. · Get plenty of rest. 
· Eat a balanced, healthy diet. · Try to reduce stress. Regular exercise and massages may help. · Limit alcohol. Heavy drinking can make your tremors worse. · Avoid drinks or foods with caffeine if they make your tremors worse. These include tea, cola, coffee, and chocolate. · Wear a heavy bracelet or watch. This adds a little weight to your hand. The extra weight may reduce tremors. · Drink from cups or glasses that are only half full. You may also want to try drinking with a straw. When should you call for help? Watch closely for changes in your health, and be sure to contact your doctor if: 
· You notice your tremors are getting worse. · You can't do your everyday activities because of your tremors. · You are sad and embarrassed about your shaking. Where can you learn more? Go to http://giuseppe-abi.info/. Enter B746 in the search box to learn more about \"Benign Essential Tremor: Care Instructions. \" Current as of: October 14, 2016 Content Version: 11.3 © 1426-3480 [employer]. Care instructions adapted under license by [employer] (which disclaims liability or warranty for this information). If you have questions about a medical condition or this instruction, always ask your healthcare professional. [address] any warranty or liability for your use of this information. Introducing [facility] & HEALTH SERVICES! Dear Marixa Rao: Thank you for requesting a [employer] account. Our records indicate that you already have an active [employer] account. You can access your account anytime at [email or web address] Did you know that you can access your hospital and ER discharge instructions at any time in [employer]? You can also review all of your test results from your hospital stay or ER visit. Additional Information If you have questions, please visit the Frequently Asked Questions section of the [employer] website at [email or web address] Remember, [employer] is NOT to be used for urgent needs. For medical emergencies, dial 911. Now available from your iPhone and Android! Please provide this summary of care documentation to your next provider. Your primary care clinician is listed as Williams Constantino. If you have any questions after today's visit, please call [phone].

## 2017-07-07 LAB
AMMONIA PLAS-MCNC: 32 UG/DL (ref 19–87)
FOLATE SERPL-MCNC: >20 NG/ML
VIT B12 SERPL-MCNC: 1383 PG/ML (ref 211–946)

## 2017-07-24 DIAGNOSIS — Z79.01 CHRONIC ANTICOAGULATION: ICD-10-CM

## 2017-08-01 ENCOUNTER — OFFICE VISIT (OUTPATIENT)
Dept: INTERNAL MEDICINE CLINIC | Age: 82
End: 2017-08-01

## 2017-08-01 VITALS
HEART RATE: 60 BPM | TEMPERATURE: 97.7 F | SYSTOLIC BLOOD PRESSURE: 108 MMHG | HEIGHT: 66 IN | WEIGHT: 126 LBS | BODY MASS INDEX: 20.25 KG/M2 | RESPIRATION RATE: 18 BRPM | DIASTOLIC BLOOD PRESSURE: 69 MMHG | OXYGEN SATURATION: 97 %

## 2017-08-01 DIAGNOSIS — R31.29 MICROSCOPIC HEMATURIA: ICD-10-CM

## 2017-08-01 DIAGNOSIS — N30.90 CYSTITIS: Primary | ICD-10-CM

## 2017-08-01 RX ORDER — CIPROFLOXACIN 250 MG/1
TABLET, FILM COATED ORAL
COMMUNITY
Start: 2017-07-30 | End: 2017-10-12 | Stop reason: ALTCHOICE

## 2017-08-01 NOTE — LETTER
8/1/2017 1:38 PM 
 
Ms. Prem Grant [address] [place] [address] To whom it may concern,  
 
Due to medical issues, Ms. Enmanuel Guaman is unable to travel on their upcoming trip (8/12/2017 and returning on 8/19/2017). Judith Andrade

## 2017-08-01 NOTE — MR AVS SNAPSHOT
Visit Information Date & Time Provider Department Dept. Phone Encounter #  
 8/1/2017  1:00 PM Mark Anthony Carney MD [facility] [address] [id number] Your Appointments 12/14/2017  1:40 PM  
Follow Up with Luh Pierre MD  
[facility]) Appt Note: tremor [place] [address] [facility] [id number] [phone]  
  
   
 [place] [address] [id number] [address] Upcoming Health Maintenance Date Due  
 GLAUCOMA SCREENING Q2Y 4/8/2017 INFLUENZA AGE 9 TO ADULT 8/1/2017 MEDICARE YEARLY EXAM 6/13/2018 DTaP/Tdap/Td series (2 - Td) 5/17/2026 Allergies as of 8/1/2017  Review Complete On: 8/1/2017 By: Mark Anthony Carney MD  
  
 Severity Noted Reaction Type Reactions Pcn [Penicillins]  10/05/2015    Hives Current Immunizations  Reviewed on 10/5/2015 Name Date Influenza High Dose Vaccine PF 11/1/2016 Influenza Vaccine (Quad) PF 10/5/2015 Pneumococcal Conjugate (PCV-13) 9/1/2015 Pneumococcal Polysaccharide (PPSV-23) 10/1/2002 Tdap 9/1/2015 Zoster Vaccine, Live 6/1/2009 Not reviewed this visit You Were Diagnosed With   
  
 Codes Comments Cystitis    -  Primary ICD-10-CM: N30.90 ICD-9-CM: 595.9 Microscopic hematuria     ICD-10-CM: R31.29 ICD-9-CM: 599.72 Vitals BP Pulse Temp Resp Height(growth percentile) Weight(growth percentile) 108/69 (BP 1 Location: Left arm, BP Patient Position: Sitting) 60 97.7 °F (36.5 °C) (Oral) 18 5' 6\" (1.676 m) 126 lb (57.2 kg) SpO2 BMI OB Status Smoking Status 97% 20.34 kg/m2 Hysterectomy Former Smoker Vitals History BMI and BSA Data Body Mass Index Body Surface Area  
 20.34 kg/m 2 1.63 m 2 Preferred Pharmacy Pharmacy Name Phone [id number] [address] [facility] [address] [phone] Your Updated Medication List  
  
   
This list is accurate as of: 8/1/17  1:36 PM.  Always use your most recent med list.  
  
  
  
  
 ALPRAZolam 0.25 mg tablet Commonly known as:  Smitha Alvarado Take 1 Tab by mouth two (2) times daily as needed for Anxiety. Max Daily Amount: 0.5 mg.  
  
 AMITIZA 8 mcg capsule Generic drug:  lubiPROStone TAKE ONE CAPSULE BY MOUTH TWICE A DAY WITH MEALS  
  
 amLODIPine 5 mg tablet Commonly known as:  Kath Seltzer Take 1 Tab by mouth daily. bisoprolol-hydroCHLOROthiazide 10-6.25 mg per tablet Commonly known as:  [facility] TAKE ONE TABLET BY MOUTH ONCE DAILY  
  
 celecoxib 200 mg capsule Commonly known as:  CELEBREX Take 1 Cap by mouth daily as needed. ciprofloxacin HCl 250 mg tablet Commonly known as:  CIPRO clonazePAM 0.5 mg tablet Commonly known as:  Layman Sella Take 1 Tab by mouth daily as needed. Indications: ESSENTIAL TREMOR  
  
 fluticasone 50 mcg/actuation nasal spray Commonly known as:  Amberly Coffee 2 Sprays by Both Nostrils route daily. lisinopril 10 mg tablet Commonly known as:  PRINIVIL, ZESTRIL  
TAKE 1 TABLET BY MOUTH EVERY DAY  
  
 nitrofurantoin 50 mg capsule Commonly known as:  MACRODANTIN  
TAKE ONE CAPSULE BY MOUTH EVERY DAY  
  
 polyethylene glycol 17 gram packet Commonly known as:  Claudeen Cast Take 1 Packet by mouth daily. * warfarin 5 mg tablet Commonly known as:  COUMADIN Take 1 Tab by mouth every Tuesday, Thursday, Saturday & Sunday. * warfarin 4 mg tablet Commonly known as:  COUMADIN Take 1 Tab by mouth every Monday, Wednesday, Friday. * Notice: This list has 2 medication(s) that are the same as other medications prescribed for you. Read the directions carefully, and ask your doctor or other care provider to review them with you. To-Do List   
 Around 08/11/2017 Lab:  URINALYSIS W/ RFLX MICROSCOPIC Patient Instructions Check urine in 7-10 days. Introducing [facility] & HEALTH SERVICES! Dear Lynne Anderson: Thank you for requesting a [employer] account. Our records indicate that you already have an active [employer] account. You can access your account anytime at [email or web address] Did you know that you can access your hospital and ER discharge instructions at any time in [employer]? You can also review all of your test results from your hospital stay or ER visit. Additional Information If you have questions, please visit the Frequently Asked Questions section of the [employer] website at [email or web address] Remember, [employer] is NOT to be used for urgent needs. For medical emergencies, dial 911. Now available from your iPhone and Android! Please provide this summary of care documentation to your next provider. Your primary care clinician is listed as Eboni Russell. If you have any questions after today's visit, please call [phone].

## 2017-08-01 NOTE — PROGRESS NOTES
Sherley Busby is a 80 y.o. female who presents today for Hospital Follow Up  . She has a history of   Patient Active Problem List   Diagnosis Code    HTN (hypertension) I10    IBS (irritable bowel syndrome) K58.9    Cataract H26.9    Diverticulosis K57.90    Atrial fibrillation (HCC) I48.91    Chronic cystitis N30.20    Chronic anticoagulation Z79.01    Advanced care planning/counseling discussion Z71.89   . Today patient is here for f/u.   she does not have other concerns. Seen by neurology for tremor. Noted to be most likely essential tremor. PRN klonopin prescribed. Seen at [facility] on 7/30 for urgency/dysuria for couple day. Her UA showed Some Leuks, Blood. She was placed on cipro 250 BID, for 7 days. Since she notes that she is overall better. Pt denies any continued dysuria. No back or flank pain. Sera Lather being held. ROS  Review of Systems   Constitutional: Negative for chills, fever and weight loss. Respiratory: Negative for cough and shortness of breath. Cardiovascular: Negative for chest pain, palpitations and leg swelling. Gastrointestinal: Negative for abdominal pain, nausea and vomiting. Genitourinary: Positive for dysuria (Improved). Neurological: Positive for tremors. Negative for dizziness, tingling, sensory change, speech change and focal weakness. Endo/Heme/Allergies: Does not bruise/bleed easily. Psychiatric/Behavioral: Negative for depression. The patient is not nervous/anxious. Visit Vitals    /69 (BP 1 Location: Left arm, BP Patient Position: Sitting)    Pulse 60    Temp 97.7 °F (36.5 °C) (Oral)    Resp 18    Ht 5' 6\" (1.676 m)    Wt 126 lb (57.2 kg)    SpO2 97%    BMI 20.34 kg/m2       Physical Exam   Constitutional: She is oriented to person, place, and time. She appears well-developed and well-nourished. HENT:   Head: Normocephalic and atraumatic.    Eyes: EOM are normal. Pupils are equal, round, and reactive to light. Cardiovascular: Normal rate and regular rhythm. No murmur heard. Pulmonary/Chest: Effort normal and breath sounds normal. No respiratory distress. Abdominal: Soft. Bowel sounds are normal. She exhibits no distension. Neurological: She is alert and oriented to person, place, and time. Skin: Skin is warm and dry. Psychiatric: She has a normal mood and affect. Her behavior is normal.         Current Outpatient Prescriptions   Medication Sig    ciprofloxacin HCl (CIPRO) 250 mg tablet     AMITIZA 8 mcg capsule TAKE ONE CAPSULE BY MOUTH TWICE A DAY WITH MEALS (Patient taking differently: One tab every other day)    amLODIPine (NORVASC) 5 mg tablet Take 1 Tab by mouth daily.  clonazePAM (KLONOPIN) 0.5 mg tablet Take 1 Tab by mouth daily as needed. Indications: ESSENTIAL TREMOR    fluticasone (FLONASE) 50 mcg/actuation nasal spray 2 Sprays by Both Nostrils route daily.  nitrofurantoin (MACRODANTIN) 50 mg capsule TAKE ONE CAPSULE BY MOUTH EVERY DAY    lisinopril (PRINIVIL, ZESTRIL) 10 mg tablet TAKE 1 TABLET BY MOUTH EVERY DAY    ALPRAZolam (XANAX) 0.25 mg tablet Take 1 Tab by mouth two (2) times daily as needed for Anxiety. Max Daily Amount: 0.5 mg.  polyethylene glycol (MIRALAX) 17 gram packet Take 1 Packet by mouth daily.  bisoprolol-hydroCHLOROthiazide (ZIAC) 10-6.25 mg per tablet TAKE ONE TABLET BY MOUTH ONCE DAILY    warfarin (COUMADIN) 4 mg tablet Take 1 Tab by mouth every Monday, Wednesday, Friday.  warfarin (COUMADIN) 5 mg tablet Take 1 Tab by mouth every Tuesday, Thursday, Saturday & .  celecoxib (CELEBREX) 200 mg capsule Take 1 Cap by mouth daily as needed. No current facility-administered medications for this visit.          Past Medical History:   Diagnosis Date    Diverticula, colon     Hypertension     IBS (irritable bowel syndrome)     Irregular heart beat       Past Surgical History:   Procedure Laterality Date    HX  SECTION      X 3    HX COLONOSCOPY  8/28/15    with endoscopy day before    HX HYSTERECTOMY        Social History   Substance Use Topics    Smoking status: Former Smoker    Smokeless tobacco: Never Used    Alcohol use Yes      Family History   Problem Relation Age of Onset    Tuberculosis Mother         Allergies   Allergen Reactions    Pcn [Penicillins] Hives        Assessment/Plan  Diagnoses and all orders for this visit:    1. Cystitis - On cipro. No need for more than 5 days. Hematuria noted. Likely due to cystitis. Needs to repeat in 10 days or so. 2. Microscopic hematuria  -     URINALYSIS W/ RFLX MICROSCOPIC;  Future        Follow-up Disposition: Not on File    Ehsan Leal MD  8/1/2017

## 2017-08-03 ENCOUNTER — TELEPHONE (OUTPATIENT)
Dept: INTERNAL MEDICINE CLINIC | Age: 82
End: 2017-08-03

## 2017-08-03 NOTE — TELEPHONE ENCOUNTER
Pt states that when she was in on Tuesday and had a UA done, she was told there may need to be additional testing. States she was called today and told that was not needed and there were no additional problems.

## 2017-08-03 NOTE — TELEPHONE ENCOUNTER
F/U with Pt; [facility] contacted her and advised her that her urine culture came back normal. Pt wanted provider to be aware. Pt will f/u as discussed.

## 2017-08-08 ENCOUNTER — HOSPITAL ENCOUNTER (OUTPATIENT)
Dept: LAB | Age: 82
Discharge: HOME | End: 2017-08-08
Payer: MEDICARE

## 2017-08-08 PROCEDURE — 85610 PROTHROMBIN TIME: CPT

## 2017-08-08 PROCEDURE — 36415 COLL VENOUS BLD VENIPUNCTURE: CPT

## 2017-08-08 PROCEDURE — 81001 URINALYSIS AUTO W/SCOPE: CPT

## 2017-08-09 LAB
APPEARANCE UR: ABNORMAL
BACTERIA #/AREA URNS HPF: ABNORMAL /[HPF]
BILIRUB UR QL STRIP: NEGATIVE
CASTS URNS QL MICRO: ABNORMAL /LPF
COLOR UR: YELLOW
EPI CELLS #/AREA URNS HPF: ABNORMAL /HPF
GLUCOSE UR QL: NEGATIVE
HGB UR QL STRIP: ABNORMAL
INR PPP: 2.3 (ref 0.8–1.2)
KETONES UR QL STRIP: NEGATIVE
LEUKOCYTE ESTERASE UR QL STRIP: ABNORMAL
MICRO URNS: ABNORMAL
MUCOUS THREADS URNS QL MICRO: PRESENT
NITRITE UR QL STRIP: NEGATIVE
PH UR STRIP: 6.5 [PH] (ref 5–7.5)
PROT UR QL STRIP: NEGATIVE
PROTHROMBIN TIME: 23.3 SEC (ref 9.1–12)
RBC #/AREA URNS HPF: ABNORMAL /HPF
SP GR UR: 1.01 (ref 1–1.03)
UROBILINOGEN UR STRIP-MCNC: 0.2 MG/DL (ref 0.2–1)
WBC #/AREA URNS HPF: ABNORMAL /HPF

## 2017-08-09 NOTE — PROGRESS NOTES
Please let her know that there was no blood cells in her urine. Her INR is good. Continue same dose of coumadin.

## 2017-08-11 DIAGNOSIS — R31.29 MICROSCOPIC HEMATURIA: ICD-10-CM

## 2017-08-25 ENCOUNTER — TELEPHONE (OUTPATIENT)
Dept: INTERNAL MEDICINE CLINIC | Age: 82
End: 2017-08-25

## 2017-08-25 NOTE — TELEPHONE ENCOUNTER
----- Message from Lashanda Chandra sent at 8/25/2017  9:08 AM EDT -----  Regarding: Dr. Lenny Watson telephone  Contact: [phone]  Pt son Dallin Knott is requesting a call back to check the status of claim .  Pt' best contact number is [phone]

## 2017-08-25 NOTE — TELEPHONE ENCOUNTER
----- Message from Megan Anderson sent at 8/25/2017  9:08 AM EDT -----  Regarding: Dr. Sylvia Wallace telephone  Contact: [phone]  Pt son Noam Emmanuel is requesting a call back to check the status of claim .  Pt' best contact number is [phone]

## 2017-08-25 NOTE — TELEPHONE ENCOUNTER
Patient's son calling to check the status of a claim form that was faxed to  sometime this week regarding a trip his mother was unable to go on. I advised the form has been received but since PCP is out of the office form will be completed when he returns next week. Srinivasa Piña advised that was fine he just wanted to be sure the form was actually faxed to the office. It has been placed in 's nurse To Do folder.

## 2017-09-01 ENCOUNTER — DOCUMENTATION ONLY (OUTPATIENT)
Dept: INTERNAL MEDICINE CLINIC | Age: 82
End: 2017-09-01

## 2017-09-01 NOTE — PROGRESS NOTES
Physician's form has been faxed to [facility], [phone]. Phone: [phone] ext [phone].

## 2017-09-21 RX ORDER — BISOPROLOL FUMARATE AND HYDROCHLOROTHIAZIDE 10; 6.25 MG/1; MG/1
TABLET ORAL
Qty: 90 TAB | Refills: 1 | Status: SHIPPED | OUTPATIENT
Start: 2017-09-21 | End: 2018-03-26 | Stop reason: SDUPTHER

## 2017-10-04 DIAGNOSIS — D68.9 COAGULATION DISORDER (HCC): ICD-10-CM

## 2017-10-04 DIAGNOSIS — Z51.81 ANTICOAGULATION GOAL OF INR 2 TO 3: ICD-10-CM

## 2017-10-04 DIAGNOSIS — Z79.01 ANTICOAGULATION GOAL OF INR 2 TO 3: ICD-10-CM

## 2017-10-05 RX ORDER — WARFARIN SODIUM 5 MG/1
TABLET ORAL
Qty: 30 TAB | Refills: 3 | Status: SHIPPED | OUTPATIENT
Start: 2017-10-05 | End: 2017-11-06 | Stop reason: SDUPTHER

## 2017-10-12 ENCOUNTER — OFFICE VISIT (OUTPATIENT)
Dept: INTERNAL MEDICINE CLINIC | Age: 82
End: 2017-10-12

## 2017-10-12 VITALS
RESPIRATION RATE: 16 BRPM | OXYGEN SATURATION: 97 % | TEMPERATURE: 97.6 F | SYSTOLIC BLOOD PRESSURE: 123 MMHG | DIASTOLIC BLOOD PRESSURE: 69 MMHG | HEART RATE: 70 BPM | BODY MASS INDEX: 20.09 KG/M2 | HEIGHT: 66 IN | WEIGHT: 125 LBS

## 2017-10-12 DIAGNOSIS — I10 ESSENTIAL HYPERTENSION: Primary | ICD-10-CM

## 2017-10-12 DIAGNOSIS — M79.601 RIGHT ARM PAIN: ICD-10-CM

## 2017-10-12 DIAGNOSIS — Z23 ENCOUNTER FOR IMMUNIZATION: ICD-10-CM

## 2017-10-12 DIAGNOSIS — R29.898 WEAKNESS OF RIGHT HAND: ICD-10-CM

## 2017-10-12 DIAGNOSIS — Z79.01 CHRONIC ANTICOAGULATION: ICD-10-CM

## 2017-10-12 DIAGNOSIS — I48.20 CHRONIC ATRIAL FIBRILLATION (HCC): ICD-10-CM

## 2017-10-12 DIAGNOSIS — M25.511 RIGHT SHOULDER PAIN, UNSPECIFIED CHRONICITY: ICD-10-CM

## 2017-10-12 LAB
INR BLD: 2.7
PT POC: 32.8 SECONDS
VALID INTERNAL CONTROL?: YES

## 2017-10-12 RX ORDER — ACETAMINOPHEN 500 MG
TABLET ORAL
COMMUNITY
End: 2019-05-09 | Stop reason: SDUPTHER

## 2017-10-12 RX ORDER — DICLOFENAC SODIUM 10 MG/G
2 GEL TOPICAL
Qty: 100 G | Refills: 3 | Status: SHIPPED | OUTPATIENT
Start: 2017-10-12 | End: 2018-11-09 | Stop reason: SDUPTHER

## 2017-10-12 NOTE — PROGRESS NOTES
Miladis Sierra is a 80 y.o. female who presents today for Hypertension; Irritable Bowel Syndrome; and Arm Pain (shoulder/right arm)  . She has a history of   Patient Active Problem List   Diagnosis Code    HTN (hypertension) I10    IBS (irritable bowel syndrome) K58.9    Cataract H26.9    Diverticulosis K57.90    Atrial fibrillation (HCC) I48.91    Chronic cystitis N30.20    Chronic anticoagulation Z79.01    Advanced care planning/counseling discussion Z71.89   . Today patient is here for f/u.   she does not have other concerns. Musculoskeletal pain:  She wishes to address discomfort in the Right shoulder at today's visit. This has been moderate in nature, sudden, starting about 1 months ago in onset. Pain with lifting arms but no  fever, rash, erythema of joints, joint swelling, injury has been noted by the patient. Self treatment: nothing. she does not have a history of osteoarthritis joint disease. Not using that arm due to pain. Some increased weakness to R hand. Some radiation down her arm. Anticoagulation  INR 2.7 today. Continue current dose. Patient here for followup of chronic anticoagulation. Indication: Atrial Fibrillation. Bleeding Signs/Symptoms:  None. Compliance with warfarin:  Yes  INR's are drawn regularly and have been Therapeutic. Lab Results   Component Value Date/Time    INR 2.3 08/08/2017 02:00 PM    INR 2.4 06/12/2017 02:07 PM    INR 2.6 04/10/2017 11:50 AM    INR POC 2.7 10/12/2017 04:33 PM    Prothrombin time 23.3 08/08/2017 02:00 PM    Prothrombin time 24.1 06/12/2017 02:07 PM    Prothrombin time 26.1 04/10/2017 11:50 AM     Hypertension - very good control. Continue current meds. Hypertension ROS: taking medications as instructed, no medication side effects noted, no TIA's, no chest pain on exertion, no dyspnea on exertion, no swelling of ankles     reports that she has quit smoking.  She has never used smokeless tobacco.    reports that she drinks alcohol. BP Readings from Last 2 Encounters:   10/12/17 123/69   08/01/17 108/69         ROS  Review of Systems   Constitutional: Negative for chills, fever and weight loss. HENT: Negative for ear discharge, ear pain, hearing loss and tinnitus. Respiratory: Negative for cough and shortness of breath. Cardiovascular: Negative for chest pain, palpitations and leg swelling. Gastrointestinal: Negative for abdominal pain, heartburn, nausea and vomiting. Genitourinary: Negative for dysuria, frequency, hematuria and urgency. Musculoskeletal: Positive for joint pain (R shoulder pain). Negative for back pain, falls, myalgias and neck pain. Skin: Negative for itching and rash. Neurological: Negative. Endo/Heme/Allergies: Does not bruise/bleed easily. Psychiatric/Behavioral: Negative for depression. The patient is not nervous/anxious. Visit Vitals    /69 (BP 1 Location: Left arm, BP Patient Position: Sitting)    Pulse 70    Temp 97.6 °F (36.4 °C) (Oral)    Resp 16    Ht 5' 6\" (1.676 m)    Wt 125 lb (56.7 kg)    SpO2 97%    BMI 20.18 kg/m2       Physical Exam   Constitutional: She is oriented to person, place, and time. She appears well-developed and well-nourished. HENT:   Head: Normocephalic and atraumatic. Cardiovascular: Normal rate and regular rhythm. No murmur heard. Pulmonary/Chest: Effort normal. No respiratory distress. Musculoskeletal:   Pain from shoulder to wrist on R. Limitations of ROM of R shoulder due to pain. Pain with active and passive ROM. Strength normal to both hands. No pain to shoulder with resisted external rotation. Neurological: She is alert and oriented to person, place, and time. Skin: Skin is warm and dry. Psychiatric: She has a normal mood and affect.  Her behavior is normal.         Current Outpatient Prescriptions   Medication Sig    acetaminophen (TYLENOL EXTRA STRENGTH) 500 mg tablet Take  by mouth every six (6) hours as needed for Pain.    diclofenac (VOLTAREN) 1 % gel Apply 2 g to affected area four (4) times daily as needed.  warfarin (COUMADIN) 5 mg tablet TAKE 1 TABLET BY MOUTH EVERY TUESDAY, THURSDAY, SATURDAY, AND     bisoprolol-hydroCHLOROthiazide (ZIAC) 10-6.25 mg per tablet TAKE 1 TABLET BY MOUTH EVERY DAY    celecoxib (CELEBREX) 200 mg capsule TAKE ONE CAPSULE BY MOUTH EVERY DAY AS NEEDED    polyethylene glycol (MIRALAX) 17 gram packet MIX 1 PACKET IN LIQUID AND DRINK BY MOUTH DAILY    AMITIZA 8 mcg capsule TAKE ONE CAPSULE BY MOUTH TWICE A DAY WITH MEALS (Patient taking differently: One tab every other day)    amLODIPine (NORVASC) 5 mg tablet Take 1 Tab by mouth daily.  clonazePAM (KLONOPIN) 0.5 mg tablet Take 1 Tab by mouth daily as needed. Indications: ESSENTIAL TREMOR    fluticasone (FLONASE) 50 mcg/actuation nasal spray 2 Sprays by Both Nostrils route daily.  nitrofurantoin (MACRODANTIN) 50 mg capsule TAKE ONE CAPSULE BY MOUTH EVERY DAY    lisinopril (PRINIVIL, ZESTRIL) 10 mg tablet TAKE 1 TABLET BY MOUTH EVERY DAY    ALPRAZolam (XANAX) 0.25 mg tablet Take 1 Tab by mouth two (2) times daily as needed for Anxiety. Max Daily Amount: 0.5 mg.    warfarin (COUMADIN) 4 mg tablet Take 1 Tab by mouth every Monday, Wednesday, Friday. No current facility-administered medications for this visit.          Past Medical History:   Diagnosis Date    Diverticula, colon     Hypertension     IBS (irritable bowel syndrome)     Irregular heart beat       Past Surgical History:   Procedure Laterality Date    HX  SECTION      X 3    HX COLONOSCOPY  8/28/15    with endoscopy day before    HX HYSTERECTOMY        Social History   Substance Use Topics    Smoking status: Former Smoker    Smokeless tobacco: Never Used    Alcohol use Yes      Family History   Problem Relation Age of Onset    Tuberculosis Mother         Allergies   Allergen Reactions    Pcn [Penicillins] Hives        Assessment/Plan  Diagnoses and all orders for this visit:    1. Essential hypertension - stable. Continue current meds. 2. Chronic atrial fibrillation (HCC) - INR at goal. Repeat in 6 weeks. -     AMB POC PT/INR  -     PROTHROMBIN TIME + INR; Future    3. Chronic anticoagulation  -     AMB POC PT/INR  -     PROTHROMBIN TIME + INR; Future    4. Right shoulder pain, unspecified chronicity - shoulder/arm pain. May be from neck. Subacute to chronic. Xray today, if not better see ortho. Topical NSAIDs. -     XR SHOULDER RT AP/LAT MIN 2 V; Future  -     XR SPINE CERV 4 OR 5 V; Future  -     diclofenac (VOLTAREN) 1 % gel; Apply 2 g to affected area four (4) times daily as needed. -     REFERRAL TO ORTHOPEDIC SURGERY  -     XR HUMERUS RT; Future    5. Right arm pain  -     XR SHOULDER RT AP/LAT MIN 2 V; Future  -     XR SPINE CERV 4 OR 5 V; Future  -     diclofenac (VOLTAREN) 1 % gel; Apply 2 g to affected area four (4) times daily as needed. -     REFERRAL TO ORTHOPEDIC SURGERY  -     XR HUMERUS RT; Future    6. Weakness of right hand  -     XR SHOULDER RT AP/LAT MIN 2 V; Future  -     XR SPINE CERV 4 OR 5 V; Future  -     diclofenac (VOLTAREN) 1 % gel; Apply 2 g to affected area four (4) times daily as needed. -     REFERRAL TO ORTHOPEDIC SURGERY  -     XR HUMERUS RT; Future    7. Encounter for immunization  -     ADMIN INFLUENZA VIRUS VAC  -     INFLUENZA VIRUS VACCINE, HIGH DOSE SEASONAL, PRESERVATIVE FREE        Follow-up Disposition:  Return in about 3 months (around 1/12/2018).     Oralia Wheeler MD  10/12/2017

## 2017-10-12 NOTE — MR AVS SNAPSHOT
Visit Information Date & Time Provider Department Dept. Phone Encounter #  
 10/12/2017  2:45 PM Munir Siegel MD [facility] [address] [id number] Follow-up Instructions Return in about 3 months (around 1/12/2018). Your Appointments 12/14/2017  1:40 PM  
Follow Up with Vale Moon MD  
[facility] Appt Note: tremor [place] [address] [facility] [id number] [phone]  
  
   
 [place] [address] [address] [address] Upcoming Health Maintenance Date Due  
 GLAUCOMA SCREENING Q2Y 4/8/2017 INFLUENZA AGE 9 TO ADULT 8/1/2017 MEDICARE YEARLY EXAM 6/13/2018 DTaP/Tdap/Td series (2 - Td) 5/17/2026 Allergies as of 10/12/2017  Review Complete On: 34/77/0711 By: Sadia Peters Severity Noted Reaction Type Reactions Pcn [Penicillins]  10/05/2015    Hives Current Immunizations  Reviewed on 10/5/2015 Name Date Influenza High Dose Vaccine PF  Incomplete, 11/1/2016 Influenza Vaccine (Quad) PF 10/5/2015 Pneumococcal Conjugate (PCV-13) 9/1/2015 Pneumococcal Polysaccharide (PPSV-23) 10/1/2002 Tdap 9/1/2015 Zoster Vaccine, Live 6/1/2009 Not reviewed this visit You Were Diagnosed With   
  
 Codes Comments Essential hypertension    -  Primary ICD-10-CM: I10 
ICD-9-CM: 401.9 Chronic atrial fibrillation (HCC)     ICD-10-CM: A74.2 ICD-9-CM: 427.31 Chronic anticoagulation     ICD-10-CM: Z79.01 
ICD-9-CM: V58.61 Right shoulder pain, unspecified chronicity     ICD-10-CM: M25.511 ICD-9-CM: 719.41 Right arm pain     ICD-10-CM: D47.575 ICD-9-CM: 729.5 Weakness of right hand     ICD-10-CM: R29.898 ICD-9-CM: 728.87 Encounter for immunization     ICD-10-CM: W51 ICD-9-CM: V03.89 Vitals BP Pulse Temp Resp Height(growth percentile) Weight(growth percentile) 123/69 (BP 1 Location: Left arm, BP Patient Position: Sitting) 70 97.6 °F (36.4 °C) (Oral) 16 5' 6\" (1.676 m) 125 lb (56.7 kg) SpO2 BMI OB Status Smoking Status 97% 20.18 kg/m2 Hysterectomy Former Smoker Vitals History BMI and BSA Data Body Mass Index Body Surface Area  
 20.18 kg/m 2 1.62 m 2 Preferred Pharmacy Pharmacy Name Phone [id number]- [place], [address] AT [facility] [phone] Your Updated Medication List  
  
   
This list is accurate as of: 10/12/17  4:04 PM.  Always use your most recent med list.  
  
  
  
  
 ALPRAZolam 0.25 mg tablet Commonly known as:  Subhash Hoang Take 1 Tab by mouth two (2) times daily as needed for Anxiety. Max Daily Amount: 0.5 mg.  
  
 AMITIZA 8 mcg capsule Generic drug:  lubiPROStone TAKE ONE CAPSULE BY MOUTH TWICE A DAY WITH MEALS  
  
 amLODIPine 5 mg tablet Commonly known as:  Lafayette Mend Take 1 Tab by mouth daily. bisoprolol-hydroCHLOROthiazide 10-6.25 mg per tablet Commonly known as:  [facility] TAKE 1 TABLET BY MOUTH EVERY DAY  
  
 celecoxib 200 mg capsule Commonly known as:  CELEBREX  
TAKE ONE CAPSULE BY MOUTH EVERY DAY AS NEEDED  
  
 clonazePAM 0.5 mg tablet Commonly known as:  Akron Khanh Take 1 Tab by mouth daily as needed. Indications: ESSENTIAL TREMOR  
  
 diclofenac 1 % Gel Commonly known as:  VOLTAREN Apply 2 g to affected area four (4) times daily as needed. fluticasone 50 mcg/actuation nasal spray Commonly known as:  Cathlyn Beata 2 Sprays by Both Nostrils route daily. lisinopril 10 mg tablet Commonly known as:  PRINIVIL, ZESTRIL  
TAKE 1 TABLET BY MOUTH EVERY DAY  
  
 nitrofurantoin 50 mg capsule Commonly known as:  MACRODANTIN  
TAKE ONE CAPSULE BY MOUTH EVERY DAY  
  
 polyethylene glycol 17 gram packet Commonly known as:  Shandra Clause MIX 1 PACKET IN LIQUID AND DRINK BY MOUTH DAILY  
  
 TYLENOL EXTRA STRENGTH 500 mg tablet Generic drug:  acetaminophen Take  by mouth every six (6) hours as needed for Pain. * warfarin 4 mg tablet Commonly known as:  COUMADIN Take 1 Tab by mouth every Monday, Wednesday, Friday. * warfarin 5 mg tablet Commonly known as:  COUMADIN  
TAKE 1 TABLET BY MOUTH EVERY TUESDAY, THURSDAY, SATURDAY, AND SUNDAY * Notice: This list has 2 medication(s) that are the same as other medications prescribed for you. Read the directions carefully, and ask your doctor or other care provider to review them with you. Prescriptions Sent to Pharmacy Refills  
 diclofenac (VOLTAREN) 1 % gel 3 Sig: Apply 2 g to affected area four (4) times daily as needed. Class: Normal  
 Pharmacy: [address] [place] [address] #: [phone] Route: Topical  
  
We Performed the Following ADMIN INFLUENZA VIRUS VAC [ [facility]] AMB POC PT/INR [46827 CPT(R)] INFLUENZA VIRUS VACCINE, HIGH DOSE SEASONAL, PRESERVATIVE FREE [66688 CPT(R)] REFERRAL TO ORTHOPEDIC SURGERY [REF62 Custom] Follow-up Instructions Return in about 3 months (around 1/12/2018). To-Do List   
 10/12/2017 Imaging:  XR HUMERUS LT   
  
 10/12/2017 Imaging:  XR SHOULDER RT AP/LAT MIN 2 V   
  
 10/12/2017 Imaging:  XR SPINE CERV 4 OR 5 V   
  
 11/16/2017 Lab:  PROTHROMBIN TIME + INR Referral Information Referral ID Referred By Referred To  
  
 [id number] [address] Phone: [phone] Fax: [phone] Visits Status Start Date End Date 1 New Request 10/12/17 10/12/18 If your referral has a status of pending review or denied, additional information will be sent to support the outcome of this decision. Introducing [facility]! Dear Ashley Duran: Thank you for requesting a [employer] account. Our records indicate that you already have an active [employer] account. You can access your account anytime at [email or web address] Did you know that you can access your hospital and ER discharge instructions at any time in [employer]? You can also review all of your test results from your hospital stay or ER visit. Additional Information If you have questions, please visit the Frequently Asked Questions section of the [employer] website at [email or web address] Remember, [employer] is NOT to be used for urgent needs. For medical emergencies, dial 911. Now available from your iPhone and Android! Please provide this summary of care documentation to your next provider. Your primary care clinician is listed as Brenden Sierra. If you have any questions after today's visit, please call [phone].

## 2017-10-19 ENCOUNTER — TELEPHONE (OUTPATIENT)
Dept: INTERNAL MEDICINE CLINIC | Age: 82
End: 2017-10-19

## 2017-10-19 NOTE — TELEPHONE ENCOUNTER
LVM for Pt to R/C to office regarding X-ray results from 10/16/17. X-rays of R shoulder, R humerus and C-spine show no acute abnormality. Dr Marina Mcgregor recommends f/u with ortho.

## 2017-11-06 ENCOUNTER — TELEPHONE (OUTPATIENT)
Dept: INTERNAL MEDICINE CLINIC | Age: 82
End: 2017-11-06

## 2017-11-06 DIAGNOSIS — Z51.81 ANTICOAGULATION GOAL OF INR 2 TO 3: ICD-10-CM

## 2017-11-06 DIAGNOSIS — Z79.01 ANTICOAGULATION GOAL OF INR 2 TO 3: ICD-10-CM

## 2017-11-06 DIAGNOSIS — D68.9 COAGULATION DISORDER (HCC): ICD-10-CM

## 2017-11-06 RX ORDER — WARFARIN SODIUM 5 MG/1
TABLET ORAL
Qty: 30 TAB | Refills: 5 | Status: SHIPPED | OUTPATIENT
Start: 2017-11-06 | End: 2018-06-23 | Stop reason: DRUGHIGH

## 2017-11-06 RX ORDER — WARFARIN 4 MG/1
4 TABLET ORAL
Qty: 30 TAB | Refills: 5 | Status: SHIPPED | OUTPATIENT
Start: 2017-11-06 | End: 2018-06-23 | Stop reason: DRUGHIGH

## 2017-11-06 NOTE — TELEPHONE ENCOUNTER
----- Message from Kareem Youssef sent at 11/6/2017  2:58 PM EST -----  Regarding: Dr. Abbe Sharma / Karel Miguel Pharmacist with [facility] on [address] Is requesting a new Rx for the patiens \"warfarin\" to a thirty pill supply as it is cheaper in the longrun for the pt to do it this way. The pharmacy also wanted to change manufacture to \"jantoven\" same thing as warfarin just a different manufacture pt ok with change pharmacy wanted to make sure  is ok with the change first. The pharmacy phone [phone] and fax number is [phone].

## 2017-11-06 NOTE — TELEPHONE ENCOUNTER
Advised pharmacist as per provider. Rx for coumadin 4 mg and 5 mg, qty: 30 have been sent to pharmacy.

## 2017-11-15 RX ORDER — LISINOPRIL 10 MG/1
TABLET ORAL
Qty: 90 TAB | Refills: 1 | Status: SHIPPED | OUTPATIENT
Start: 2017-11-15 | End: 2018-05-15 | Stop reason: SDUPTHER

## 2017-11-15 NOTE — TELEPHONE ENCOUNTER
Patient needs a refill on her lisinopril (PRINIVIL, ZESTRIL) 10 mg tablet 90 days supply called into the [facility] [phone]  Her no is [phone]

## 2017-11-16 DIAGNOSIS — I48.20 CHRONIC ATRIAL FIBRILLATION (HCC): ICD-10-CM

## 2017-11-16 DIAGNOSIS — Z79.01 CHRONIC ANTICOAGULATION: ICD-10-CM

## 2017-12-05 ENCOUNTER — HOSPITAL ENCOUNTER (OUTPATIENT)
Dept: LAB | Age: 82
Discharge: HOME | End: 2017-12-05
Payer: MEDICARE

## 2017-12-05 PROCEDURE — 85610 PROTHROMBIN TIME: CPT

## 2017-12-05 PROCEDURE — 36415 COLL VENOUS BLD VENIPUNCTURE: CPT

## 2017-12-06 LAB
INR PPP: 2.7 (ref 0.8–1.2)
PROTHROMBIN TIME: 26.4 SEC (ref 9.1–12)

## 2017-12-19 ENCOUNTER — TELEPHONE (OUTPATIENT)
Dept: INTERNAL MEDICINE CLINIC | Age: 82
End: 2017-12-19

## 2017-12-19 DIAGNOSIS — N30.20 CHRONIC CYSTITIS: ICD-10-CM

## 2017-12-19 DIAGNOSIS — K58.1 IRRITABLE BOWEL SYNDROME WITH CONSTIPATION: ICD-10-CM

## 2017-12-19 RX ORDER — POLYETHYLENE GLYCOL 3350 17 G/17G
POWDER, FOR SOLUTION ORAL
Qty: 100 PACKET | Refills: 1 | Status: SHIPPED | OUTPATIENT
Start: 2017-12-19 | End: 2018-07-02 | Stop reason: SDUPTHER

## 2017-12-19 RX ORDER — NITROFURANTOIN MACROCRYSTALS 50 MG/1
CAPSULE ORAL
Qty: 90 CAP | Refills: 1 | Status: SHIPPED | OUTPATIENT
Start: 2017-12-19 | End: 2018-07-23 | Stop reason: SDUPTHER

## 2017-12-19 NOTE — TELEPHONE ENCOUNTER
----- Message from Kristin Payne sent at 12/19/2017 12:14 PM EST -----  Regarding: Dr. Papo Gann  The patient is requesting that a refill for Rx's Polyethylene Glycol 3350ns and Nitrosurantoin MCR 15mg capsule is called into the pharmacy.  ([facility])[phone] (y)[phone]

## 2018-01-02 DIAGNOSIS — I10 ESSENTIAL HYPERTENSION: ICD-10-CM

## 2018-01-02 RX ORDER — AMLODIPINE BESYLATE 5 MG/1
5 TABLET ORAL DAILY
Qty: 90 TAB | Refills: 1 | Status: SHIPPED | OUTPATIENT
Start: 2018-01-02 | End: 2018-02-06 | Stop reason: ALTCHOICE

## 2018-01-27 DIAGNOSIS — I10 ESSENTIAL HYPERTENSION: ICD-10-CM

## 2018-01-27 RX ORDER — AMLODIPINE BESYLATE 5 MG/1
TABLET ORAL
Qty: 90 TAB | Refills: 1 | Status: SHIPPED | OUTPATIENT
Start: 2018-01-27 | End: 2018-08-09 | Stop reason: SDUPTHER

## 2018-02-06 ENCOUNTER — OFFICE VISIT (OUTPATIENT)
Dept: INTERNAL MEDICINE CLINIC | Age: 83
End: 2018-02-06

## 2018-02-06 VITALS
WEIGHT: 127 LBS | HEART RATE: 81 BPM | BODY MASS INDEX: 20.41 KG/M2 | OXYGEN SATURATION: 99 % | HEIGHT: 66 IN | TEMPERATURE: 98 F | DIASTOLIC BLOOD PRESSURE: 75 MMHG | SYSTOLIC BLOOD PRESSURE: 121 MMHG | RESPIRATION RATE: 16 BRPM

## 2018-02-06 DIAGNOSIS — S76.211A GROIN STRAIN, RIGHT, INITIAL ENCOUNTER: ICD-10-CM

## 2018-02-06 DIAGNOSIS — Z79.01 CHRONIC ANTICOAGULATION: Primary | ICD-10-CM

## 2018-02-06 DIAGNOSIS — I10 ESSENTIAL HYPERTENSION: ICD-10-CM

## 2018-02-06 DIAGNOSIS — N39.0 URINARY TRACT INFECTION WITHOUT HEMATURIA, SITE UNSPECIFIED: ICD-10-CM

## 2018-02-06 LAB
INR BLD: 2.8
PT POC: 33 SECONDS
VALID INTERNAL CONTROL?: YES

## 2018-02-06 RX ORDER — CEPHALEXIN 500 MG/1
CAPSULE ORAL
COMMUNITY
Start: 2018-02-03 | End: 2018-11-08

## 2018-02-06 RX ORDER — METHOCARBAMOL 750 MG/1
750 TABLET, FILM COATED ORAL 4 TIMES DAILY
Qty: 60 TAB | Refills: 1 | Status: SHIPPED | OUTPATIENT
Start: 2018-02-06 | End: 2018-05-08 | Stop reason: ALTCHOICE

## 2018-02-06 RX ORDER — METHOCARBAMOL 500 MG/1
TABLET, FILM COATED ORAL
COMMUNITY
Start: 2018-02-03 | End: 2018-06-05 | Stop reason: SDUPTHER

## 2018-02-06 RX ORDER — DIAZEPAM 2 MG/1
2 TABLET ORAL
Qty: 3 TAB | Refills: 0 | Status: SHIPPED | OUTPATIENT
Start: 2018-02-06 | End: 2018-02-09

## 2018-02-06 NOTE — PROGRESS NOTES
Alta Castillo is a 80 y.o. female who presents today for Coagulation disorder; Groin Pain (pulled groin muscle ); and Bladder Infection (went to [facility] on Saturday)      She has a history of   Patient Active Problem List   Diagnosis Code    HTN (hypertension) I10    IBS (irritable bowel syndrome) K58.9    Cataract H26.9    Diverticulosis K57.90    Atrial fibrillation (HCC) I48.91    Chronic cystitis N30.20    Chronic anticoagulation Z79.01    Advanced care planning/counseling discussion Z71.89   . Today patient is here for f/u. Musculoskeletal pain:  She wishes to address discomfort in the Right groin at today's visit. This occurred one week ago. This has been moderate in nature, sudden, related to moving leg the wrong way. Mechanism of injury: uncomfortable motion. Muscle spasm but no  fever, rash, joint swelling has been noted by the patient. Self treatment: Urgent care provided robaxin 500mg PO BID. UTI: seen at urgent care and placed on Keflex. Feeling better. Hypertension - CCB, ACEi as well as Ziac. Hypertension ROS: taking medications as instructed, no medication side effects noted, no TIA's, no chest pain on exertion, no dyspnea on exertion, no swelling of ankles     reports that she has quit smoking. She has never used smokeless tobacco.    reports that she drinks alcohol. BP Readings from Last 2 Encounters:   02/06/18 121/75   10/12/17 123/69     Anticoagulation  Today INR is 2.8. Patient here for followup of chronic anticoagulation. Indication: Atrial Fibrillation. Bleeding Signs/Symptoms:  None. Compliance with warfarin:  Yes  INR's are drawn regularly and have been Therapeutic.   Lab Results   Component Value Date/Time    INR 2.7 12/05/2017 02:34 PM    INR 2.3 08/08/2017 02:00 PM    INR 2.4 06/12/2017 02:07 PM    INR POC 2.8 02/06/2018 02:24 PM    INR POC 2.7 10/12/2017 04:33 PM    Prothrombin time 26.4 12/05/2017 02:34 PM    Prothrombin time 23.3 08/08/2017 02:00 PM    Prothrombin time 24.1 06/12/2017 02:07 PM       ROS  Review of Systems   Constitutional: Negative for chills, fever and weight loss. Respiratory: Negative for cough, hemoptysis, sputum production, shortness of breath and wheezing. Cardiovascular: Negative for chest pain, palpitations, orthopnea, claudication and leg swelling. Gastrointestinal: Negative for abdominal pain, nausea and vomiting. Genitourinary: Positive for dysuria. Negative for flank pain, frequency, hematuria and urgency. Musculoskeletal: Negative for back pain, joint pain, myalgias and neck pain. R groin pain   Neurological: Negative. Endo/Heme/Allergies: Does not bruise/bleed easily. Psychiatric/Behavioral: Negative for depression. The patient is not nervous/anxious. Visit Vitals    /75 (BP 1 Location: Left arm, BP Patient Position: Sitting)    Pulse 81    Temp 98 °F (36.7 °C) (Oral)    Resp 16    Ht 5' 6\" (1.676 m)    Wt 127 lb (57.6 kg)    SpO2 99%    BMI 20.5 kg/m2       Physical Exam   Constitutional: She is oriented to person, place, and time. She appears well-developed and well-nourished. HENT:   Head: Normocephalic and atraumatic. Cardiovascular: Normal rate. No murmur heard. Irregular   Pulmonary/Chest: Effort normal. No respiratory distress. Musculoskeletal:        Legs:  Pain where noted with ROM. No swelling of area. Neurological: She is alert and oriented to person, place, and time. Skin: Skin is warm and dry. Psychiatric: She has a normal mood and affect. Her behavior is normal.         Current Outpatient Prescriptions   Medication Sig    methocarbamol (ROBAXIN) 500 mg tablet     cephALEXin (KEFLEX) 500 mg capsule     amLODIPine (NORVASC) 5 mg tablet TAKE 1 TABLET BY MOUTH EVERY DAY    amLODIPine (NORVASC) 5 mg tablet Take 1 Tab by mouth daily.     polyethylene glycol (MIRALAX) 17 gram packet MIX 1 PACKET IN LIQUID AND DRINK BY MOUTH DAILY    nitrofurantoin (MACRODANTIN) 50 mg capsule TAKE ONE CAPSULE BY MOUTH EVERY DAY    lisinopril (PRINIVIL, ZESTRIL) 10 mg tablet TAKE 1 TABLET BY MOUTH EVERY DAY    warfarin (COUMADIN) 4 mg tablet Take 1 Tab by mouth every Monday, Wednesday, Friday.  warfarin (COUMADIN) 5 mg tablet TAKE 1 TABLET BY MOUTH EVERY TUESDAY, THURSDAY, SATURDAY, AND     acetaminophen (TYLENOL EXTRA STRENGTH) 500 mg tablet Take  by mouth every six (6) hours as needed for Pain.  diclofenac (VOLTAREN) 1 % gel Apply 2 g to affected area four (4) times daily as needed.  bisoprolol-hydroCHLOROthiazide (ZIAC) 10-6.25 mg per tablet TAKE 1 TABLET BY MOUTH EVERY DAY    celecoxib (CELEBREX) 200 mg capsule TAKE ONE CAPSULE BY MOUTH EVERY DAY AS NEEDED    AMITIZA 8 mcg capsule TAKE ONE CAPSULE BY MOUTH TWICE A DAY WITH MEALS (Patient taking differently: One tab every other day)    clonazePAM (KLONOPIN) 0.5 mg tablet Take 1 Tab by mouth daily as needed. Indications: ESSENTIAL TREMOR    fluticasone (FLONASE) 50 mcg/actuation nasal spray 2 Sprays by Both Nostrils route daily.  ALPRAZolam (XANAX) 0.25 mg tablet Take 1 Tab by mouth two (2) times daily as needed for Anxiety. Max Daily Amount: 0.5 mg. No current facility-administered medications for this visit. Past Medical History:   Diagnosis Date    Diverticula, colon     Hypertension     IBS (irritable bowel syndrome)     Irregular heart beat       Past Surgical History:   Procedure Laterality Date    HX  SECTION      X 3    HX COLONOSCOPY  8/28/15    with endoscopy day before    HX HYSTERECTOMY        Social History   Substance Use Topics    Smoking status: Former Smoker    Smokeless tobacco: Never Used    Alcohol use Yes      Family History   Problem Relation Age of Onset    Tuberculosis Mother         Allergies   Allergen Reactions    Pcn [Penicillins] Hives        Assessment/Plan  Diagnoses and all orders for this visit:    1.  Chronic anticoagulation - INR stable. Repeat in 6 weeks. -     AMB POC PT/INR  -     PROTHROMBIN TIME + INR; Future    2. Groin strain, right, initial encounter - on low dose muscle relaxer. Increase dose. 3 nights with valium at bedtime. Heat and slow stretching. Low threshold to send to PT if not better. -     methocarbamol (ROBAXIN) 750 mg tablet; Take 1 Tab by mouth four (4) times daily. -     diazePAM (VALIUM) 2 mg tablet; Take 1 Tab by mouth nightly as needed for Anxiety for up to 3 days. Max Daily Amount: 2 mg. 3. Urinary tract infection without hematuria, site unspecified - On treatment, better. 4. Essential hypertension - stable. Over 50% of a visit of 25 minutes spent reviewing diagnosis and treatment options and side effects of medicines.       Follow-up Disposition: Not on File    Bhavesh Mcconnell MD  2/6/2018

## 2018-02-06 NOTE — PATIENT INSTRUCTIONS
Groin Strain: Care Instructions  Your Care Instructions  A groin strain is an injury that happens when you tear or overstretch (pull) a groin muscle. The groin muscles are in the area on either side of the body in the folds where the belly joins the legs. You can strain a groin muscle during exercise, such as running, skating, kicking in soccer, or playing basketball. It can happen when you lift, push, or pull heavy objects. You might pull a groin muscle when you fall. The injury can range from a minor pull to a more serious tear of the muscle. You may feel pain and tenderness that's worse when you squeeze your legs together. You may also have pain when you raise the knee of the injured side. There may be swelling or bruising in the groin area or inner thigh. If you have a bad strain, you may walk with a limp while it heals. Rest and other home care can help the muscle heal. Healing can take up to 3 weeks or more. Your doctor may want to see you again in 2 to 3 weeks. Follow-up care is a key part of your treatment and safety. Be sure to make and go to all appointments, and call your doctor if you are having problems. It's also a good idea to know your test results and keep a list of the medicines you take. How can you care for yourself at home? · Be safe with medicines. Read and follow all instructions on the label. ¨ If the doctor gave you a prescription medicine for pain, take it as prescribed. ¨ If you are not taking a prescription pain medicine, ask your doctor if you can take an over-the-counter medicine. · Rest and protect your injured or sore groin area for 1 to 2 weeks. Stop, change, or take a break from any activity that may be causing your pain or soreness. Do not do intense activities while you still have pain. · Put ice or a cold pack on your groin area for 10 to 20 minutes at a time. Try to do this every 1 to 2 hours for the next 3 days (when you are awake) or until the swelling goes down. Put a thin cloth between the ice and your skin. · After 2 or 3 days, if your swelling is gone, apply heat. Put a warm water bottle, a heating pad set on low, or a warm cloth on your groin area. Do not go to sleep with a heating pad on your skin. · If your doctor gave you crutches, make sure you use them as directed. · Wear snug shorts or underwear that support the injured area. When should you call for help? Call your doctor now or seek immediate medical care if:  ? · You have new or severe pain or swelling in the groin area. ? · Your groin or upper thigh is cool or pale or changes color. ? · You have tingling, weakness, or numbness in your groin or leg. ? · You cannot move your leg. ? · You cannot put weight on your leg. ? Watch closely for changes in your health, and be sure to contact your doctor if:  ? · You do not get better as expected. Where can you learn more? Go to http://giuseppe-abi.info/. Enter Z987 in the search box to learn more about \"Groin Strain: Care Instructions. \"  Current as of: March 21, 2017  Content Version: 11.4  © 7870-3537 [employer]. Care instructions adapted under license by [employer] (which disclaims liability or warranty for this information). If you have questions about a medical condition or this instruction, always ask your healthcare professional. [place] [address] any warranty or liability for your use of this information. Hip Flexor Strain: Rehab Exercises  Your Care Instructions  Here are some examples of typical rehabilitation exercises for your condition. Start each exercise slowly. Ease off the exercise if you start to have pain. Your doctor or physical therapist will tell you when you can start these exercises and which ones will work best for you. How to do the exercises  Pelvic tilt with marching    1. Lie on your back with your knees bent and your feet flat on the floor.   2. Tighten your belly muscles and buttocks, and press your lower back to the floor. 3. Keeping your knees bent, lift and then lower one leg up off the floor, and then lift and lower your other leg like you are marching. Each time you lift your leg, hold that position for about 6 seconds before lowering your leg. 4. Repeat 8 to 12 times. Scissors    1. Lie on your back with your knees bent at a 90-degree angle and your feet off the floor. 2. Tighten your belly muscles and buttocks, and press your lower back to the floor. 3. Slowly straighten one leg, and hold that position for about 6 seconds. Your leg should be about 12 inches off the floor. Bring that leg back to the starting position, and then straighten your other leg. Hold that position for about 6 seconds, and then switch legs again. 4. Repeat 8 to 12 times. Hamstring stretch (lying down)    1. Lie flat on your back with your legs straight. If you feel discomfort in your back, place a small towel roll under your lower back. 2. Holding the back of your affected leg for support, lift your leg straight up and toward your body until you feel a stretch at the back of your thigh. 3. Hold the stretch for at least 30 seconds. 4. Repeat 2 to 4 times. Quadricep and hip flexor stretch (lying on side)    1. Lie on your side with your good leg flat on the floor and your hand supporting your head. 2. Bend your top leg, and reach behind you to grab the front of that foot or ankle with your other hand. 3. Stretch your leg back by pulling your foot toward your buttock. You will feel the stretch in the front of your thigh. If this causes stress on your knee, do not do this stretch. 4. Hold the stretch for at least 15 to 30 seconds. 5. Repeat 2 to 4 times. Hip flexor stretch (kneeling)    1. Kneel on your affected leg and bend your good leg out in front of you, with that foot flat on the floor.  If you feel discomfort in the front of your knee, place a towel under your knee.  2. Keeping your back straight, slowly push your hips forward until you feel a stretch in the upper thigh of your back leg and hip. 3. Hold the stretch for at least 15 to 30 seconds. 4. Repeat 2 to 4 times. Hip flexor stretch (edge of table)    1. Lie flat on your back on a table or flat bench, with your knees and lower legs hanging off the edge of the table. 2. Grab your good leg at the knee, and pull that knee back toward your chest. Relax your affected leg and let it hang down toward the floor until you feel a stretch in the upper thigh of your affected leg and hip. 3. Hold the stretch for at least 15 to 30 seconds. 4. Repeat 2 to 4 times. Follow-up care is a key part of your treatment and safety. Be sure to make and go to all appointments, and call your doctor if you are having problems. It's also a good idea to know your test results and keep a list of the medicines you take. Where can you learn more? Go to http://giuseppe-abi.info/. Enter B726 in the search box to learn more about \"Hip Flexor Strain: Rehab Exercises. \"  Current as of: March 21, 2017  Content Version: 11.4  © 2652-4954 [employer]. Care instructions adapted under license by [employer] (which disclaims liability or warranty for this information). If you have questions about a medical condition or this instruction, always ask your healthcare professional. [place] [address] any warranty or liability for your use of this information.

## 2018-02-06 NOTE — MR AVS SNAPSHOT
[address] [facility] 
 
 
 [address] [place] [address] [facility] 
[phone] Patient: Tila Thurman MRN: [id number] FWD:[id number] Visit Information Date & Time Provider Department Dept. Phone Encounter #  
 2/6/2018  2:00 PM Lefty Kurtz MD [facility] [address] [facility] [id number] Follow-up Instructions Return in about 3 months (around 5/6/2018). Upcoming Health Maintenance Date Due  
 GLAUCOMA SCREENING Q2Y 4/8/2017 MEDICARE YEARLY EXAM 6/13/2018 DTaP/Tdap/Td series (2 - Td) 5/17/2026 Allergies as of 2/6/2018  Review Complete On: 3/9/4658 By: Deven Peters Severity Noted Reaction Type Reactions Pcn [Penicillins]  10/05/2015    Hives Current Immunizations  Reviewed on 10/12/2017 Name Date Influenza High Dose Vaccine PF 10/12/2017, 11/1/2016 Influenza Vaccine (Quad) PF 10/5/2015 Pneumococcal Conjugate (PCV-13) 9/1/2015 Pneumococcal Polysaccharide (PPSV-23) 10/1/2002 Tdap 9/1/2015 Zoster Vaccine, Live 6/1/2009 Not reviewed this visit You Were Diagnosed With   
  
 Codes Comments Chronic anticoagulation    -  Primary ICD-10-CM: Z79.01 
ICD-9-CM: V58.61 Groin strain, right, initial encounter     ICD-10-CM: S76.211A ICD-9-CM: 848.8 Urinary tract infection without hematuria, site unspecified     ICD-10-CM: N39.0 ICD-9-CM: 599.0 Vitals BP Pulse Temp Resp Height(growth percentile) Weight(growth percentile) 121/75 (BP 1 Location: Left arm, BP Patient Position: Sitting) 81 98 °F (36.7 °C) (Oral) 16 5' 6\" (1.676 m) 127 lb (57.6 kg) SpO2 BMI OB Status Smoking Status 99% 20.5 kg/m2 Hysterectomy Former Smoker Vitals History BMI and BSA Data Body Mass Index Body Surface Area 20.5 kg/m 2 1.64 m 2 Preferred Pharmacy Pharmacy Name Phone  [facility] [phone] Your Updated Medication List  
  
   
This list is accurate as of: 2/6/18  2:46 PM.  Always use your most recent med list.  
  
  
  
  
 ALPRAZolam 0.25 mg tablet Commonly known as:  Dina Jayme Take 1 Tab by mouth two (2) times daily as needed for Anxiety. Max Daily Amount: 0.5 mg.  
  
 AMITIZA 8 mcg capsule Generic drug:  lubiPROStone TAKE ONE CAPSULE BY MOUTH TWICE A DAY WITH MEALS  
  
 amLODIPine 5 mg tablet Commonly known as:  Larayne James TAKE 1 TABLET BY MOUTH EVERY DAY  
  
 bisoprolol-hydroCHLOROthiazide 10-6.25 mg per tablet Commonly known as:  [facility]ly [facility], [facility] TAKE 1 TABLET BY MOUTH EVERY DAY  
  
 celecoxib 200 mg capsule Commonly known as:  CELEBREX  
TAKE ONE CAPSULE BY MOUTH EVERY DAY AS NEEDED  
  
 cephALEXin 500 mg capsule Commonly known as:  KEFLEX  
  
 clonazePAM 0.5 mg tablet Commonly known as:  Kriss Abdias Take 1 Tab by mouth daily as needed. Indications: ESSENTIAL TREMOR  
  
 diazePAM 2 mg tablet Commonly known as:  VALIUM Take 1 Tab by mouth nightly as needed for Anxiety for up to 3 days. Max Daily Amount: 2 mg.  
  
 diclofenac 1 % Gel Commonly known as:  VOLTAREN Apply 2 g to affected area four (4) times daily as needed. fluticasone 50 mcg/actuation nasal spray Commonly known as:  Enrrique Salgado 2 Sprays by Both Nostrils route daily. lisinopril 10 mg tablet Commonly known as:  PRINIVIL, ZESTRIL  
TAKE 1 TABLET BY MOUTH EVERY DAY  
  
 * methocarbamol 500 mg tablet Commonly known as:  ROBAXIN  
  
 * methocarbamol 750 mg tablet Commonly known as:  ROBAXIN Take 1 Tab by mouth four (4) times daily. nitrofurantoin 50 mg capsule Commonly known as:  MACRODANTIN  
TAKE ONE CAPSULE BY MOUTH EVERY DAY  
  
 polyethylene glycol 17 gram packet Commonly known as:  Lisa Bob MIX 1 PACKET IN LIQUID AND DRINK BY MOUTH DAILY  
  
 TYLENOL EXTRA STRENGTH 500 mg tablet Generic drug:  acetaminophen Take  by mouth every six (6) hours as needed for Pain. * warfarin 4 mg tablet Commonly known as:  COUMADIN Take 1 Tab by mouth every Monday, Wednesday, Friday. * warfarin 5 mg tablet Commonly known as:  COUMADIN  
TAKE 1 TABLET BY MOUTH EVERY TUESDAY, THURSDAY, SATURDAY, AND SUNDAY * Notice: This list has 4 medication(s) that are the same as other medications prescribed for you. Read the directions carefully, and ask your doctor or other care provider to review them with you. Prescriptions Printed Refills  
 diazePAM (VALIUM) 2 mg tablet 0 Sig: Take 1 Tab by mouth nightly as needed for Anxiety for up to 3 days. Max Daily Amount: 2 mg. Class: Print Route: Oral  
  
Prescriptions Sent to Pharmacy Refills  
 methocarbamol (ROBAXIN) 750 mg tablet 1 Sig: Take 1 Tab by mouth four (4) times daily. Class: Normal  
 Pharmacy: [facility] #: [phone] Route: Oral  
  
We Performed the Following AMB POC PT/INR [93101 CPT(R)] Follow-up Instructions Return in about 3 months (around 5/6/2018). To-Do List   
 Around 03/13/2018 Lab:  PROTHROMBIN TIME + INR Patient Instructions Groin Strain: Care Instructions Your Care Instructions A groin strain is an injury that happens when you tear or overstretch (pull) a groin muscle. The groin muscles are in the area on either side of the body in the folds where the belly joins the legs. You can strain a groin muscle during exercise, such as running, skating, kicking in soccer, or playing basketball. It can happen when you lift, push, or pull heavy objects. You might pull a groin muscle when you fall. The injury can range from a minor pull to a more serious tear of the muscle. You may feel pain and tenderness that's worse when you squeeze your legs together.  You may also have pain when you raise the knee of the injured side. There may be swelling or bruising in the groin area or inner thigh. If you have a bad strain, you may walk with a limp while it heals. Rest and other home care can help the muscle heal. Healing can take up to 3 weeks or more. Your doctor may want to see you again in 2 to 3 weeks. Follow-up care is a key part of your treatment and safety. Be sure to make and go to all appointments, and call your doctor if you are having problems. It's also a good idea to know your test results and keep a list of the medicines you take. How can you care for yourself at home? · Be safe with medicines. Read and follow all instructions on the label. ¨ If the doctor gave you a prescription medicine for pain, take it as prescribed. ¨ If you are not taking a prescription pain medicine, ask your doctor if you can take an over-the-counter medicine. · Rest and protect your injured or sore groin area for 1 to 2 weeks. Stop, change, or take a break from any activity that may be causing your pain or soreness. Do not do intense activities while you still have pain. · Put ice or a cold pack on your groin area for 10 to 20 minutes at a time. Try to do this every 1 to 2 hours for the next 3 days (when you are awake) or until the swelling goes down. Put a thin cloth between the ice and your skin. · After 2 or 3 days, if your swelling is gone, apply heat. Put a warm water bottle, a heating pad set on low, or a warm cloth on your groin area. Do not go to sleep with a heating pad on your skin. · If your doctor gave you crutches, make sure you use them as directed. · Wear snug shorts or underwear that support the injured area. When should you call for help? Call your doctor now or seek immediate medical care if: 
? · You have new or severe pain or swelling in the groin area. ? · Your groin or upper thigh is cool or pale or changes color. ? · You have tingling, weakness, or numbness in your groin or leg. ? · You cannot move your leg. ? · You cannot put weight on your leg. ? Watch closely for changes in your health, and be sure to contact your doctor if: 
? · You do not get better as expected. Where can you learn more? Go to http://giuseppe-abi.info/. Enter E709 in the search box to learn more about \"Groin Strain: Care Instructions. \" Current as of: March 21, 2017 Content Version: 11.4 © 8495-1066 [employer] Care instructions adapted under license by [employer] (which disclaims liability or warranty for this information). If you have questions about a medical condition or this instruction, always ask your healthcare professional. [address] any warranty or liability for your use of this information. Hip Flexor Strain: Rehab Exercises Your Care Instructions Here are some examples of typical rehabilitation exercises for your condition. Start each exercise slowly. Ease off the exercise if you start to have pain. Your doctor or physical therapist will tell you when you can start these exercises and which ones will work best for you. How to do the exercises Pelvic tilt with marching 1. Lie on your back with your knees bent and your feet flat on the floor. 2. Tighten your belly muscles and buttocks, and press your lower back to the floor. 3. Keeping your knees bent, lift and then lower one leg up off the floor, and then lift and lower your other leg like you are marching. Each time you lift your leg, hold that position for about 6 seconds before lowering your leg. 4. Repeat 8 to 12 times. Scissors 1. Lie on your back with your knees bent at a 90-degree angle and your feet off the floor. 2. Tighten your belly muscles and buttocks, and press your lower back to the floor. 3. Slowly straighten one leg, and hold that position for about 6 seconds. Your leg should be about 12 inches off the floor.  Bring that leg back to the starting position, and then straighten your other leg. Hold that position for about 6 seconds, and then switch legs again. 4. Repeat 8 to 12 times. Hamstring stretch (lying down) 1. Lie flat on your back with your legs straight. If you feel discomfort in your back, place a small towel roll under your lower back. 2. Holding the back of your affected leg for support, lift your leg straight up and toward your body until you feel a stretch at the back of your thigh. 3. Hold the stretch for at least 30 seconds. 4. Repeat 2 to 4 times. Quadricep and hip flexor stretch (lying on side) 1. Lie on your side with your good leg flat on the floor and your hand supporting your head. 2. Bend your top leg, and reach behind you to grab the front of that foot or ankle with your other hand. 3. Stretch your leg back by pulling your foot toward your buttock. You will feel the stretch in the front of your thigh. If this causes stress on your knee, do not do this stretch. 4. Hold the stretch for at least 15 to 30 seconds. 5. Repeat 2 to 4 times. Hip flexor stretch (kneeling) 1. Kneel on your affected leg and bend your good leg out in front of you, with that foot flat on the floor. If you feel discomfort in the front of your knee, place a towel under your knee. 2. Keeping your back straight, slowly push your hips forward until you feel a stretch in the upper thigh of your back leg and hip. 3. Hold the stretch for at least 15 to 30 seconds. 4. Repeat 2 to 4 times. Hip flexor stretch (edge of table) 1. Lie flat on your back on a table or flat bench, with your knees and lower legs hanging off the edge of the table. 2. Grab your good leg at the knee, and pull that knee back toward your chest. Relax your affected leg and let it hang down toward the floor until you feel a stretch in the upper thigh of your affected leg and hip. 3. Hold the stretch for at least 15 to 30 seconds. 4. Repeat 2 to 4 times. Follow-up care is a key part of your treatment and safety. Be sure to make and go to all appointments, and call your doctor if you are having problems. It's also a good idea to know your test results and keep a list of the medicines you take. Where can you learn more? Go to http://giuseppe-abi.info/. Enter L114 in the search box to learn more about \"Hip Flexor Strain: Rehab Exercises. \" Current as of: March 21, 2017 Content Version: 11.4 © 9809-3063 [employer]. Care instructions adapted under license by [employer] (which disclaims liability or warranty for this information). If you have questions about a medical condition or this instruction, always ask your healthcare professional. [address] any warranty or liability for your use of this information. Introducing [facility]! Dear Kurt Tomlin: Thank you for requesting a [employer] account. Our records indicate that you already have an active [employer] account. You can access your account anytime at [email or web address] Did you know that you can access your hospital and ER discharge instructions at any time in [employer]? You can also review all of your test results from your hospital stay or ER visit. Additional Information If you have questions, please visit the Frequently Asked Questions section of the [employer] website at [email or web address] Remember, [employer] is NOT to be used for urgent needs. For medical emergencies, dial 911. Now available from your iPhone and Android! Please provide this summary of care documentation to your next provider. Your primary care clinician is listed as Charlene Silveira. If you have any questions after today's visit, please call [phone].

## 2018-02-07 ENCOUNTER — TELEPHONE (OUTPATIENT)
Dept: INTERNAL MEDICINE CLINIC | Age: 83
End: 2018-02-07

## 2018-02-07 NOTE — TELEPHONE ENCOUNTER
----- Message from Tracie Bass sent at 2/7/2018 10:02 AM EST -----  Regarding: Dr. Chang Mccann Telephone  Patient would like Dr. Lizzy Wilkes to fax request to [facility] at [phone] to get a copy of her urine test results. Please call patient back at [phone] if there are questions.

## 2018-02-12 ENCOUNTER — DOCUMENTATION ONLY (OUTPATIENT)
Dept: INTERNAL MEDICINE CLINIC | Age: 83
End: 2018-02-12

## 2018-02-12 NOTE — PROGRESS NOTES
Received medical records from [facility]. Records have been placed on Dr. Maty Francois desteto for review.

## 2018-03-06 ENCOUNTER — DOCUMENTATION ONLY (OUTPATIENT)
Dept: INTERNAL MEDICINE CLINIC | Age: 83
End: 2018-03-06

## 2018-03-06 NOTE — PROGRESS NOTES
Questionnaire submitted. PA Case [id number] Status: Pending review. PA Case: [id number], Status: Approved, Coverage Starts on: 3/5/2018 6:42:13 PM, Coverage Ends on: 3/5/2019 6:42:13 PM. Questions? Contact [phone]. Pt's Rx coverage #: [id number]    [facility] to notify Pt.

## 2018-03-13 DIAGNOSIS — Z79.01 CHRONIC ANTICOAGULATION: ICD-10-CM

## 2018-03-20 ENCOUNTER — HOSPITAL ENCOUNTER (OUTPATIENT)
Dept: LAB | Age: 83
Discharge: HOME | End: 2018-03-20
Payer: MEDICARE

## 2018-03-20 PROCEDURE — 85610 PROTHROMBIN TIME: CPT

## 2018-03-20 PROCEDURE — 36415 COLL VENOUS BLD VENIPUNCTURE: CPT

## 2018-03-21 LAB
INR PPP: 2.3 (ref 0.8–1.2)
PROTHROMBIN TIME: 23.1 SEC (ref 9.1–12)

## 2018-05-01 RX ORDER — FLUTICASONE PROPIONATE 50 MCG
2 SPRAY, SUSPENSION (ML) NASAL DAILY
Qty: 1 BOTTLE | Refills: 6 | Status: SHIPPED | OUTPATIENT
Start: 2018-05-01 | End: 2019-05-05 | Stop reason: SDUPTHER

## 2018-05-08 ENCOUNTER — OFFICE VISIT (OUTPATIENT)
Dept: INTERNAL MEDICINE CLINIC | Age: 83
End: 2018-05-08

## 2018-05-08 VITALS
TEMPERATURE: 97.7 F | SYSTOLIC BLOOD PRESSURE: 133 MMHG | BODY MASS INDEX: 19.93 KG/M2 | WEIGHT: 124 LBS | OXYGEN SATURATION: 98 % | HEART RATE: 71 BPM | DIASTOLIC BLOOD PRESSURE: 74 MMHG | HEIGHT: 66 IN

## 2018-05-08 DIAGNOSIS — K58.1 IRRITABLE BOWEL SYNDROME WITH CONSTIPATION: ICD-10-CM

## 2018-05-08 DIAGNOSIS — I10 ESSENTIAL HYPERTENSION: ICD-10-CM

## 2018-05-08 DIAGNOSIS — Z79.01 CHRONIC ANTICOAGULATION: ICD-10-CM

## 2018-05-08 DIAGNOSIS — R45.86 MOOD CHANGE: ICD-10-CM

## 2018-05-08 DIAGNOSIS — I48.20 CHRONIC ATRIAL FIBRILLATION (HCC): Primary | ICD-10-CM

## 2018-05-08 DIAGNOSIS — E55.9 VITAMIN D DEFICIENCY: ICD-10-CM

## 2018-05-08 LAB
INR BLD: 1.9
PT POC: 22.2 SECONDS
VALID INTERNAL CONTROL?: YES

## 2018-05-08 NOTE — PROGRESS NOTES
Eduardo Cm is a 80 y.o. female who presents today for Coagulation disorder (3 month f/u)  . She has a history of   Patient Active Problem List   Diagnosis Code    HTN (hypertension) I10    IBS (irritable bowel syndrome) K58.9    Cataract H26.9    Diverticulosis K57.90    Atrial fibrillation (HCC) I48.91    Chronic cystitis N30.20    Chronic anticoagulation Z79.01    Advanced care planning/counseling discussion Z71.89   . Today patient is here for f/u. Some mild venous stasis. Anticoagulation  Today at 1.9. Patient here for followup of chronic anticoagulation. Indication: Atrial Fibrillation. Bleeding Signs/Symptoms:  None. Compliance with warfarin:  Yes  INR's are drawn regularly and have been Therapeutic. Lab Results   Component Value Date/Time    INR 2.3 (H) 03/20/2018 02:25 PM    INR 2.7 (H) 12/05/2017 02:34 PM    INR 2.3 (H) 08/08/2017 02:00 PM    INR POC 1.9 05/08/2018 02:45 PM    INR POC 2.8 02/06/2018 02:24 PM    INR POC 2.7 10/12/2017 04:33 PM    Prothrombin time 23.1 (H) 03/20/2018 02:25 PM    Prothrombin time 26.4 (H) 12/05/2017 02:34 PM    Prothrombin time 23.3 (H) 08/08/2017 02:00 PM     IBS: stable on PRN Amitiza and mainly diet changes. Hypertension - on CCB, ACEi, Xiac. Doing well. Hypertension ROS: taking medications as instructed, no medication side effects noted, no TIA's, no chest pain on exertion, no dyspnea on exertion, no swelling of ankles     reports that she has quit smoking. She has never used smokeless tobacco.    reports that she drinks alcohol. BP Readings from Last 2 Encounters:   05/08/18 133/74   02/06/18 121/75       ROS  Review of Systems   Constitutional: Negative for chills, fever, malaise/fatigue and weight loss. Eyes: Negative for blurred vision, double vision, photophobia and pain. Respiratory: Negative for cough and shortness of breath. Cardiovascular: Negative for chest pain, palpitations and leg swelling. Gastrointestinal: Positive for constipation (Stable). Negative for abdominal pain, diarrhea, heartburn, nausea and vomiting. Neurological: Negative. Endo/Heme/Allergies: Does not bruise/bleed easily. Psychiatric/Behavioral: Positive for depression (a bit worse). Negative for hallucinations, memory loss, substance abuse and suicidal ideas. The patient is not nervous/anxious and does not have insomnia. Visit Vitals    /74 (BP 1 Location: Right arm, BP Patient Position: Sitting)    Pulse 71    Temp 97.7 °F (36.5 °C) (Oral)    Ht 5' 6\" (1.676 m)    Wt 124 lb (56.2 kg)    SpO2 98%    BMI 20.01 kg/m2       Physical Exam   Constitutional: She is oriented to person, place, and time. She appears well-developed and well-nourished. HENT:   Head: Normocephalic and atraumatic. Right Ear: External ear normal.   Left Ear: External ear normal.   Cardiovascular: Normal rate and regular rhythm. No murmur heard. Pulmonary/Chest: Effort normal. No respiratory distress. Neurological: She is alert and oriented to person, place, and time. Skin: Skin is warm and dry. Chronic skin changes around ankles. Likely due to venous stasis. Psychiatric: She has a normal mood and affect. Her behavior is normal.         Current Outpatient Prescriptions   Medication Sig    fluticasone (FLONASE) 50 mcg/actuation nasal spray 2 Sprays by Both Nostrils route daily.  bisoprolol-hydroCHLOROthiazide (ZIAC) 10-6.25 mg per tablet TAKE ONE TABLET BY MOUTH ONE TIME DAILY    amLODIPine (NORVASC) 5 mg tablet TAKE 1 TABLET BY MOUTH EVERY DAY    polyethylene glycol (MIRALAX) 17 gram packet MIX 1 PACKET IN LIQUID AND DRINK BY MOUTH DAILY    nitrofurantoin (MACRODANTIN) 50 mg capsule TAKE ONE CAPSULE BY MOUTH EVERY DAY    lisinopril (PRINIVIL, ZESTRIL) 10 mg tablet TAKE 1 TABLET BY MOUTH EVERY DAY    warfarin (COUMADIN) 4 mg tablet Take 1 Tab by mouth every Monday, Wednesday, Friday.     warfarin (COUMADIN) 5 mg tablet TAKE 1 TABLET BY MOUTH EVERY TUESDAY, THURSDAY, SATURDAY, AND     acetaminophen (TYLENOL EXTRA STRENGTH) 500 mg tablet Take  by mouth every six (6) hours as needed for Pain.  diclofenac (VOLTAREN) 1 % gel Apply 2 g to affected area four (4) times daily as needed.  celecoxib (CELEBREX) 200 mg capsule TAKE ONE CAPSULE BY MOUTH EVERY DAY AS NEEDED    AMITIZA 8 mcg capsule TAKE ONE CAPSULE BY MOUTH TWICE A DAY WITH MEALS (Patient taking differently: One tab every other day)    clonazePAM (KLONOPIN) 0.5 mg tablet Take 1 Tab by mouth daily as needed. Indications: ESSENTIAL TREMOR    ALPRAZolam (XANAX) 0.25 mg tablet Take 1 Tab by mouth two (2) times daily as needed for Anxiety. Max Daily Amount: 0.5 mg.    methocarbamol (ROBAXIN) 500 mg tablet     cephALEXin (KEFLEX) 500 mg capsule      No current facility-administered medications for this visit. Past Medical History:   Diagnosis Date    Diverticula, colon     Hypertension     IBS (irritable bowel syndrome)     Irregular heart beat       Past Surgical History:   Procedure Laterality Date    HX  SECTION      X 3    HX COLONOSCOPY  8/28/15    with endoscopy day before    HX HYSTERECTOMY        Social History   Substance Use Topics    Smoking status: Former Smoker    Smokeless tobacco: Never Used    Alcohol use Yes      Family History   Problem Relation Age of Onset    Tuberculosis Mother         Allergies   Allergen Reactions    Pcn [Penicillins] Hives        Assessment/Plan  Diagnoses and all orders for this visit:    1. Chronic atrial fibrillation (HCC) - borderline. Repeat in 6 weeks. -     AMB POC PT/INR    2. Chronic anticoagulation  -     AMB POC PT/INR  -     PROTHROMBIN TIME + INR; Future    3. Irritable bowel syndrome with constipation - stable with diet. Rarely using amitiza. 4. Essential hypertension - stable. -     CBC WITH AUTOMATED DIFF; Future  -     METABOLIC PANEL, COMPREHENSIVE; Future    5. Vitamin D deficiency  -     VITAMIN D, 25 HYDROXY; Future    6. Mood change (HCC) - A bit low. Getting out less. Will be looking for some volunteering option.  Could see therapist if continues        Follow-up Disposition: Not on File    Alyx Hinojosa MD  5/8/2018

## 2018-05-08 NOTE — MR AVS SNAPSHOT
Liodelmi Reeves 
 
 
 [address] [place] [address] [facility] 
[phone] Patient: Mckayla Hannon MRN: [id number] TWQ:9/65/4111 Visit Information Date & Time Provider Department Dept. Phone Encounter #  
 5/8/2018  2:00 PM Charles Mcneill MD [facility] [address]  [id number] Follow-up Instructions Return in about 3 months (around 8/8/2018) for Medicare Wellness. Upcoming Health Maintenance Date Due  
 GLAUCOMA SCREENING Q2Y 4/8/2017 MEDICARE YEARLY EXAM 6/13/2018 Influenza Age 5 to Adult 8/1/2018 DTaP/Tdap/Td series (2 - Td) 5/17/2026 Allergies as of 5/8/2018  Review Complete On: 5/8/2018 By: Gladis Goidnez LPN Severity Noted Reaction Type Reactions Pcn [Penicillins]  10/05/2015    Hives Current Immunizations  Reviewed on 10/12/2017 Name Date Influenza High Dose Vaccine PF 10/12/2017, 11/1/2016 Influenza Vaccine (Quad) PF 10/5/2015 Pneumococcal Conjugate (PCV-13) 9/1/2015 Pneumococcal Polysaccharide (PPSV-23) 10/1/2002 Tdap 9/1/2015 Zoster Vaccine, Live 6/1/2009 Not reviewed this visit You Were Diagnosed With   
  
 Codes Comments Chronic atrial fibrillation (HCC)    -  Primary ICD-10-CM: B04.1 ICD-9-CM: 427.31 Chronic anticoagulation     ICD-10-CM: Z79.01 
ICD-9-CM: V58.61 Irritable bowel syndrome with constipation     ICD-10-CM: K58.1 ICD-9-CM: 974.0 Essential hypertension     ICD-10-CM: I10 
ICD-9-CM: 401.9 Vitamin D deficiency     ICD-10-CM: E55.9 ICD-9-CM: 268.9 Mood change ([address]     ICD-10-CM: F39 
ICD-9-CM: 296.90 Vitals BP Pulse Temp Height(growth percentile) Weight(growth percentile) SpO2  
 133/74 (BP 1 Location: Right arm, BP Patient Position: Sitting) 71 97.7 °F (36.5 °C) (Oral) 5' 6\" (1.676 m) 124 lb (56.2 kg) 98% BMI OB Status Smoking Status 20.01 kg/m2 Hysterectomy Former Smoker Vitals History BMI and BSA Data Body Mass Index Body Surface Area 20.01 kg/m 2 1.62 m 2 Preferred Pharmacy Pharm[facility] Name Phone [phone] Your Updated Medication List  
  
   
This list is accurate as of 5/8/18  3:16 PM.  Always use your most recent med list.  
  
  
  
  
 ALPRAZolam 0.25 mg tablet Commonly known as:  Burton Max Take 1 Tab by mouth two (2) times daily as needed for Anxiety. Max Daily Amount: 0.5 mg.  
  
 AMITIZA 8 mcg capsule Generic drug:  lubiPROStone TAKE ONE CAPSULE BY MOUTH TWICE A DAY WITH MEALS  
  
 amLODIPine 5 mg tablet Commonly known as:  Oliver Bille TAKE 1 TABLET BY MOUTH EVERY DAY  
  
 bisoprolol-hydroCHLOROthiazide 10-6.25 mg per tablet Commonly known as:  [facility] TA[facility] ONE TABLET BY MOUTH ONE TIME DAILY  
  
 celecoxib 200 mg capsule Commonly known as:  CELEBREX  
TAKE ONE CAPSULE BY MOUTH EVERY DAY AS NEEDED  
  
 cephALEXin 500 mg capsule Commonly known as:  KEFLEX  
  
 clonazePAM 0.5 mg tablet Commonly known as:  Rocio Storm Take 1 Tab by mouth daily as needed. Indications: ESSENTIAL TREMOR  
  
 diclofenac 1 % Gel Commonly known as:  VOLTAREN Apply 2 g to affected area four (4) times daily as needed. fluticasone 50 mcg/actuation nasal spray Commonly known as:  Eddie Devon 2 Sprays by Both Nostrils route daily. lisinopril 10 mg tablet Commonly known as:  PRINIVIL, ZESTRIL  
TAKE 1 TABLET BY MOUTH EVERY DAY  
  
 methocarbamol 500 mg tablet Commonly known as:  ROBAXIN  
  
 nitrofurantoin 50 mg capsule Commonly known as:  MACRODANTIN  
TAKE ONE CAPSULE BY MOUTH EVERY DAY  
  
 polyethylene glycol 17 gram packet Commonly known as:  Odilon Bernal MIX 1 PACKET IN LIQUID AND DRINK BY MOUTH DAILY  
  
 TYLENOL EXTRA STRENGTH 500 mg tablet Generic drug:  acetaminophen Take  by mouth every six (6) hours as needed for Pain. * warfarin 4 mg tablet Commonly known as:  COUMADIN Take 1 Tab by mouth every Monday, Wednesday, Friday. * warfarin 5 mg tablet Commonly known as:  COUMADIN  
TAKE 1 TABLET BY MOUTH EVERY TUESDAY, THURSDAY, SATURDAY, AND SUNDAY * Notice: This list has 2 medication(s) that are the same as other medications prescribed for you. Read the directions carefully, and ask your doctor or other care provider to review them with you. We Performed the Following AMB POC PT/INR [53953 CPT(R)] Follow-up Instructions Return in about 3 months (around 8/8/2018) for Medicare Wellness. To-Do List   
 Around 06/22/2018 Lab:  CBC WITH AUTOMATED DIFF Around 06/22/2018 Lab:  METABOLIC PANEL, COMPREHENSIVE Around 06/22/2018 Lab:  PROTHROMBIN TIME + INR Around 06/22/2018 Lab:  VITAMIN D, 25 HYDROXY Introducing [facility] SERVICES! Dear Esperanza Kocher: Thank you for requesting a [employer] account. Our records indicate that you already have an active [employer] account. You can access your account anytime at [email or web address] Did you know that you can access your hospital and ER discharge instructions at any time in [employer]? You can also review all of your test results from your hospital stay or ER visit. Additional Information If you have questions, please visit the Frequently Asked Questions section of the [employer] website at [email or web address] Remember, [employer] is NOT to be used for urgent needs. For medical emergencies, dial 911. Now available from your iPhone and Android! Please provide this summary of care documentation to your next provider. Your primary care clinician is listed as Miguel Cervantes. If you have any questions after today's visit, please call [phone].

## 2018-05-15 RX ORDER — LISINOPRIL 10 MG/1
TABLET ORAL
Qty: 90 TAB | Refills: 1 | Status: SHIPPED | OUTPATIENT
Start: 2018-05-15 | End: 2018-11-12 | Stop reason: SDUPTHER

## 2018-06-06 RX ORDER — METHOCARBAMOL 500 MG/1
500 TABLET, FILM COATED ORAL
Qty: 60 TAB | Refills: 1 | Status: SHIPPED | OUTPATIENT
Start: 2018-06-06 | End: 2018-11-29 | Stop reason: SDUPTHER

## 2018-06-06 NOTE — TELEPHONE ENCOUNTER
Pt states she slipped on a rug and fell 2 days ago. Pt landed on her R side, c/o ongoing muscle spasms. Pt denies any head injury, HA, dizziness, nausea, etc. Pt did not go to ED and states she does not need to come in. She is requesting a refill for Robaxin.

## 2018-06-21 ENCOUNTER — HOSPITAL ENCOUNTER (OUTPATIENT)
Dept: LAB | Age: 83
Discharge: HOME | End: 2018-06-21
Payer: MEDICARE

## 2018-06-21 PROCEDURE — 85025 COMPLETE CBC W/AUTO DIFF WBC: CPT

## 2018-06-21 PROCEDURE — 80053 COMPREHEN METABOLIC PANEL: CPT

## 2018-06-21 PROCEDURE — 85610 PROTHROMBIN TIME: CPT

## 2018-06-21 PROCEDURE — 36415 COLL VENOUS BLD VENIPUNCTURE: CPT

## 2018-06-21 PROCEDURE — 82306 VITAMIN D 25 HYDROXY: CPT

## 2018-06-22 DIAGNOSIS — Z79.01 CHRONIC ANTICOAGULATION: ICD-10-CM

## 2018-06-22 DIAGNOSIS — I10 ESSENTIAL HYPERTENSION: ICD-10-CM

## 2018-06-22 DIAGNOSIS — E55.9 VITAMIN D DEFICIENCY: ICD-10-CM

## 2018-06-22 LAB
25(OH)D3+25(OH)D2 SERPL-MCNC: 48.7 NG/ML (ref 30–100)
ALBUMIN SERPL-MCNC: 4.4 G/DL (ref 3.5–4.7)
ALBUMIN/GLOB SERPL: 2.1 {RATIO} (ref 1.2–2.2)
ALP SERPL-CCNC: 92 IU/L (ref 39–117)
ALT SERPL-CCNC: 14 IU/L (ref 0–32)
AST SERPL-CCNC: 18 IU/L (ref 0–40)
BASOPHILS # BLD AUTO: 0 X10E3/UL (ref 0–0.2)
BASOPHILS NFR BLD AUTO: 1 %
BILIRUB SERPL-MCNC: 0.8 MG/DL (ref 0–1.2)
BUN SERPL-MCNC: 13 MG/DL (ref 8–27)
BUN/CREAT SERPL: 21 (ref 12–28)
CALCIUM SERPL-MCNC: 9.4 MG/DL (ref 8.7–10.3)
CHLORIDE SERPL-SCNC: 100 MMOL/L (ref 96–106)
CO2 SERPL-SCNC: 25 MMOL/L (ref 20–29)
CREAT SERPL-MCNC: 0.62 MG/DL (ref 0.57–1)
EOSINOPHIL # BLD AUTO: 0.1 X10E3/UL (ref 0–0.4)
EOSINOPHIL NFR BLD AUTO: 2 %
ERYTHROCYTE [DISTWIDTH] IN BLOOD BY AUTOMATED COUNT: 13.9 % (ref 12.3–15.4)
GFR SERPLBLD CREATININE-BSD FMLA CKD-EPI: 80 ML/MIN/1.73
GFR SERPLBLD CREATININE-BSD FMLA CKD-EPI: 92 ML/MIN/1.73
GLOBULIN SER CALC-MCNC: 2.1 G/DL (ref 1.5–4.5)
GLUCOSE SERPL-MCNC: 101 MG/DL (ref 65–99)
HCT VFR BLD AUTO: 42.9 % (ref 34–46.6)
HGB BLD-MCNC: 14.4 G/DL (ref 11.1–15.9)
IMM GRANULOCYTES # BLD: 0 X10E3/UL (ref 0–0.1)
IMM GRANULOCYTES NFR BLD: 0 %
INR PPP: 1.7 (ref 0.8–1.2)
LYMPHOCYTES # BLD AUTO: 1 X10E3/UL (ref 0.7–3.1)
LYMPHOCYTES NFR BLD AUTO: 26 %
MCH RBC QN AUTO: 32.4 PG (ref 26.6–33)
MCHC RBC AUTO-ENTMCNC: 33.6 G/DL (ref 31.5–35.7)
MCV RBC AUTO: 97 FL (ref 79–97)
MONOCYTES # BLD AUTO: 0.3 X10E3/UL (ref 0.1–0.9)
MONOCYTES NFR BLD AUTO: 8 %
NEUTROPHILS # BLD AUTO: 2.6 X10E3/UL (ref 1.4–7)
NEUTROPHILS NFR BLD AUTO: 63 %
PLATELET # BLD AUTO: 179 X10E3/UL (ref 150–379)
POTASSIUM SERPL-SCNC: 4.3 MMOL/L (ref 3.5–5.2)
PROT SERPL-MCNC: 6.5 G/DL (ref 6–8.5)
PROTHROMBIN TIME: 17.1 SEC (ref 9.1–12)
RBC # BLD AUTO: 4.44 X10E6/UL (ref 3.77–5.28)
SODIUM SERPL-SCNC: 141 MMOL/L (ref 134–144)
WBC # BLD AUTO: 4 X10E3/UL (ref 3.4–10.8)

## 2018-06-23 DIAGNOSIS — Z79.01 CHRONIC ANTICOAGULATION: ICD-10-CM

## 2018-06-23 DIAGNOSIS — D68.9 COAGULATION DISORDER (HCC): ICD-10-CM

## 2018-06-23 DIAGNOSIS — Z51.81 ANTICOAGULATION GOAL OF INR 2 TO 3: ICD-10-CM

## 2018-06-23 DIAGNOSIS — Z79.01 ANTICOAGULATION GOAL OF INR 2 TO 3: ICD-10-CM

## 2018-06-23 DIAGNOSIS — I48.20 CHRONIC ATRIAL FIBRILLATION (HCC): Primary | ICD-10-CM

## 2018-06-23 RX ORDER — WARFARIN SODIUM 5 MG/1
TABLET ORAL
Qty: 30 TAB | Refills: 5 | Status: SHIPPED | OUTPATIENT
Start: 2018-06-23 | End: 2019-01-28 | Stop reason: SDUPTHER

## 2018-06-23 NOTE — PROGRESS NOTES
Please inform letter sent. INR is low. Change to 5mg daily and repeat in 2 weeks. Please mail or leave slip up front.

## 2018-06-25 DIAGNOSIS — I10 ESSENTIAL HYPERTENSION: ICD-10-CM

## 2018-06-25 RX ORDER — AMLODIPINE BESYLATE 5 MG/1
TABLET ORAL
Qty: 90 TAB | Refills: 1 | Status: SHIPPED | OUTPATIENT
Start: 2018-06-25 | End: 2018-12-24 | Stop reason: SDUPTHER

## 2018-06-25 NOTE — PROGRESS NOTES
Verified 3 Pt ID. Notified Pt and she verbalized understanding.    Lab slip has been faxed to Rosalino Riccih as per her request.

## 2018-07-02 DIAGNOSIS — K58.1 IRRITABLE BOWEL SYNDROME WITH CONSTIPATION: ICD-10-CM

## 2018-07-03 RX ORDER — POLYETHYLENE GLYCOL 3350 17 G/17G
POWDER, FOR SOLUTION ORAL
Qty: 100 PACKET | Refills: 1 | Status: SHIPPED | OUTPATIENT
Start: 2018-07-03 | End: 2019-01-14 | Stop reason: SDUPTHER

## 2018-07-11 ENCOUNTER — HOSPITAL ENCOUNTER (OUTPATIENT)
Dept: LAB | Age: 83
Discharge: HOME | End: 2018-07-11
Payer: MEDICARE

## 2018-07-11 PROCEDURE — 36415 COLL VENOUS BLD VENIPUNCTURE: CPT

## 2018-07-11 PROCEDURE — 85610 PROTHROMBIN TIME: CPT

## 2018-07-12 LAB
INR PPP: 1.9 (ref 0.8–1.2)
PROTHROMBIN TIME: 19.2 SEC (ref 9.1–12)

## 2018-07-23 DIAGNOSIS — N30.20 CHRONIC CYSTITIS: ICD-10-CM

## 2018-07-23 RX ORDER — NITROFURANTOIN MACROCRYSTALS 50 MG/1
CAPSULE ORAL
Qty: 90 CAP | Refills: 1 | Status: SHIPPED | OUTPATIENT
Start: 2018-07-23 | End: 2019-02-07

## 2018-07-24 ENCOUNTER — TELEPHONE (OUTPATIENT)
Dept: INTERNAL MEDICINE CLINIC | Age: 83
End: 2018-07-24

## 2018-07-24 NOTE — TELEPHONE ENCOUNTER
Patient states she needs an alternate script for Nitrofurantion  Since it will cost her over 100$.  Patient can be reached at [phone] with any questions

## 2018-07-24 NOTE — TELEPHONE ENCOUNTER
As per pharmacist, Pt is limited to 90 capsules/274 days. PA for quantity limit exception has been approved, pharmacy notified.  PA Case [id number] Status: Approved

## 2018-08-01 DIAGNOSIS — R52 PAIN: ICD-10-CM

## 2018-08-01 RX ORDER — CELECOXIB 200 MG/1
CAPSULE ORAL
Qty: 30 CAP | Refills: 2 | Status: SHIPPED | OUTPATIENT
Start: 2018-08-01 | End: 2019-01-04 | Stop reason: SDUPTHER

## 2018-08-09 ENCOUNTER — OFFICE VISIT (OUTPATIENT)
Dept: INTERNAL MEDICINE CLINIC | Age: 83
End: 2018-08-09

## 2018-08-09 VITALS
HEART RATE: 72 BPM | SYSTOLIC BLOOD PRESSURE: 120 MMHG | WEIGHT: 123.8 LBS | TEMPERATURE: 97.2 F | RESPIRATION RATE: 14 BRPM | OXYGEN SATURATION: 99 % | HEIGHT: 66 IN | BODY MASS INDEX: 19.89 KG/M2 | DIASTOLIC BLOOD PRESSURE: 75 MMHG

## 2018-08-09 DIAGNOSIS — G89.29 CHRONIC LEFT-SIDED THORACIC BACK PAIN: ICD-10-CM

## 2018-08-09 DIAGNOSIS — L57.0 ACTINIC KERATOSIS: ICD-10-CM

## 2018-08-09 DIAGNOSIS — F41.9 ANXIETY: ICD-10-CM

## 2018-08-09 DIAGNOSIS — Z00.00 MEDICARE ANNUAL WELLNESS VISIT, SUBSEQUENT: Primary | ICD-10-CM
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
## 2018-08-09 DIAGNOSIS — I48.20 CHRONIC ATRIAL FIBRILLATION (HCC): ICD-10-CM

## 2018-08-09 DIAGNOSIS — M54.6 CHRONIC LEFT-SIDED THORACIC BACK PAIN: ICD-10-CM

## 2018-08-09 DIAGNOSIS — I10 ESSENTIAL HYPERTENSION: ICD-10-CM

## 2018-08-09 DIAGNOSIS — M89.9 DISORDER OF BONE AND CARTILAGE: ICD-10-CM

## 2018-08-09 DIAGNOSIS — M94.9 DISORDER OF BONE AND CARTILAGE: ICD-10-CM

## 2018-08-09 DIAGNOSIS — Z71.89 ADVANCED DIRECTIVES, COUNSELING/DISCUSSION: ICD-10-CM

## 2018-08-09 DIAGNOSIS — R26.89 BALANCE PROBLEM: ICD-10-CM

## 2018-08-09 LAB
INR BLD: 2.3
PT POC: NORMAL SECONDS
VALID INTERNAL CONTROL?: YES

## 2018-08-09 RX ORDER — WARFARIN SODIUM 5 MG/1
5 TABLET ORAL DAILY
COMMUNITY
End: 2019-05-09 | Stop reason: SDUPTHER

## 2018-08-09 RX ORDER — ALPRAZOLAM 0.25 MG/1
0.25 TABLET ORAL
Qty: 30 TAB | Refills: 0 | Status: SHIPPED | OUTPATIENT
Start: 2018-08-09 | End: 2018-08-10 | Stop reason: SDUPTHER

## 2018-08-09 NOTE — ACP (ADVANCE CARE PLANNING)
Advance Care Planning    Advance Care Planning (ACP) Provider Conversation Snapshot    Date of ACP Conversation: 08/09/18  Persons included in Conversation:  patient  Length of ACP Conversation in minutes:  <16 minutes (Non-Billable)    Authorized Decision Maker (if patient is incapable of making informed decisions):    This person is:   Healthcare Agent/Medical Power of  under Advance Directive          For Patients with Decision Making Capacity:   Values/Goals: Exploration of values, goals, and preferences if recovery is not expected, even with continued medical treatment in the event of:  Imminent death  Severe, permanent brain injury    Conversation Outcomes / Follow-Up Plan:   Recommended communicating the plan and making copies for the healthcare agent, personal physician, and others as appropriate (e.g., health system)  Recommended review of completed ACP document annually or upon change in health status

## 2018-08-09 NOTE — MR AVS SNAPSHOT
[address] [place] [address] 
 
 
 [address] [place] [address] [place] [address] [facility] 
[phone] Patient: Leydi Mcclendon MRN: [id number] THD:7/92/6392 Visit Information Date & Time Provider Department Dept. Phone Encounter #  
 8/9/2018  1:30 PM Jose Eduardo Ross MD [facility] [address]  [id number] Upcoming Health Maintenance Date Due  
 GLAUCOMA SCREENING Q2Y 4/8/2017 MEDICARE YEARLY EXAM 6/13/2018 Influenza Age 5 to Adult 8/1/2018 DTaP/Tdap/Td series (2 - Td) 5/17/2026 Allergies as of 8/9/2018  Review Complete On: 8/9/2018 By: Donny Olivares LPN Severity Noted Reaction Type Reactions Pcn [Penicillins]  10/05/2015    Hives Current Immunizations  Reviewed on 10/12/2017 Name Date Influenza High Dose Vaccine PF 10/12/2017, 11/1/2016 Influenza Vaccine (Quad) PF 10/5/2015 Pneumococcal Conjugate (PCV-13) 9/1/2015 Pneumococcal Polysaccharide (PPSV-23) 10/1/2002 Tdap 9/1/2015 Zoster Vaccine, Live 6/1/2009 Not reviewed this visit You Were Diagnosed With   
  
 Codes Comments Medicare annual wellness visit, subsequent    -  Primary ICD-10-CM: Z00.00 ICD-9-CM: V70.0 Essential hypertension     ICD-10-CM: I10 
ICD-9-CM: 401.9 Chronic atrial fibrillation (HCC)     ICD-10-CM: A10.8 ICD-9-CM: 427.31 Advanced directives, counseling/discussion     ICD-10-CM: Z71.89 ICD-9-CM: V65.49 Chronic left-sided thoracic back pain     ICD-10-CM: M54.6, G89.29 ICD-9-CM: 724.1, 338.29 Balance problem     ICD-10-CM: R26.89 
ICD-9-CM: 781.99 Disorder of bone and cartilage     ICD-10-CM: M89.9, M94.9 ICD-9-CM: 733.90 Anxiety     ICD-10-CM: F41.9 ICD-9-CM: 300.00 Actinic keratosis     ICD-10-CM: L57.0 ICD-9-CM: 702.0 Vitals BP Pulse Temp Resp Height(growth percentile) Weight(growth percentile)  120/75 (BP 1 Location: Left arm, BP Patient Position: Sitting) 72 97.2 °F (36.2 °C) (Oral) 14 5' 6\" (1.676 m) 123 lb 12.8 oz (56.2 kg) SpO2 BMI OB Status Smoking Status 99% 19.98 kg/m2 Hysterectomy Former Smoker Vitals History BMI and BSA Data Body Mass Index Body Surface Area 19.98 kg/m 2 1.62 m 2 Preferred Pharmacy Pharmacy Name Phone [facility]  [phone] Your Updated Medication List  
  
   
This list is accurate as of 8/9/18  2:27 PM.  Always use your most recent med list.  
  
  
  
  
 ALPRAZolam 0.25 mg tablet Commonly known as:  Irwin Bernard Take 1 Tab by mouth two (2) times daily as needed for Anxiety. Max Daily Amount: 0.5 mg.  
  
 AMITIZA 8 mcg capsule Generic drug:  lubiPROStone TAKE ONE CAPSULE BY MOUTH TWICE A DAY WITH MEALS  
  
 amLODIPine 5 mg tablet Commonly known as:  Tad Roni TAKE ONE TABLET BY MOUTH ONE TIME DAILY  
  
 bisoprolol-hydroCHLOROthiazide 10-6.25 mg per tablet Commonly known as:  [facility] TAKE ONE TABLET BY MOUTH ONE TIME DAILY  
  
 celecoxib 200 mg capsule Commonly known as:  CELEBREX  
TAKE ONE CAPSULE BY MOUTH ONE TIME DAILY AS NEEDED  
  
 cephALEXin 500 mg capsule Commonly known as:  KEFLEX  
  
 diclofenac 1 % Gel Commonly known as:  VOLTAREN Apply 2 g to affected area four (4) times daily as needed. fluticasone 50 mcg/actuation nasal spray Commonly known as:  Rachael Shames 2 Sprays by Both Nostrils route daily. lisinopril 10 mg tablet Commonly known as:  PRINIVIL, ZESTRIL  
TAKE ONE TABLET BY MOUTH ONE TIME DAILY  
  
 methocarbamol 500 mg tablet Commonly known as:  ROBAXIN Take 1 Tab by mouth four (4) times daily as needed. nitrofurantoin 50 mg capsule Commonly known as:  MACRODANTIN  
TAKE ONE CAPSULE BY MOUTH ONE TIME DAILY polyethylene glycol 17 gram packet Commonly known as:  Wing Armor MIX 1 PACKET IN LIQUID AND DRINK BY MOUTH DAILY  
  
 TYLENOL EXTRA STRENGTH 500 mg tablet Generic drug:  acetaminophen Take  by mouth every six (6) hours as needed for Pain. * JANTOVEN 5 mg tablet Generic drug:  warfarin Take 5 mg by mouth daily. * warfarin 5 mg tablet Commonly known as:  COUMADIN  
TAKE 1 TABLET BY MOUTH DAILY * Notice: This list has 2 medication(s) that are the same as other medications prescribed for you. Read the directions carefully, and ask your doctor or other care provider to review them with you. Prescriptions Printed Refills ALPRAZolam (XANAX) 0.25 mg tablet 0 Sig: Take 1 Tab by mouth two (2) times daily as needed for Anxiety. Max Daily Amount: 0.5 mg.  
 Class: Print Route: Oral  
  
We Performed the Following AMB POC PT/INR [16503 CPT(R)] REFERRAL TO PHYSICAL THERAPY [YLV59 Custom] Comments:  
 Please evaluate patient for balance training and L sided back pain. To-Do List   
 08/09/2018 Imaging:  DEXA BONE DENSITY STUDY AXIAL Around 09/23/2018 Lab:  PROTHROMBIN TIME + INR Referral Information Referral ID Referred By Referred To  
  
 [id number] Anh Squires Not Available Visits Status Start Date End Date 1 New Request 8/9/18 8/9/19 If your referral has a status of pending review or denied, additional information will be sent to support the outcome of this decision. Patient Instructions Medicare Wellness Visit, Female The best way to live healthy is to have a lifestyle where you eat a well-balanced diet, exercise regularly, limit alcohol use, and quit all forms of tobacco/nicotine, if applicable. Regular preventive services are another way to keep healthy. Preventive services (vaccines, screening tests, monitoring & exams) can help personalize your care plan, which helps you manage your own care. Screening tests can find health problems at the earliest stages, when they are easiest to treat. [address] [facility] follows the current, evidence-based guidelines published by the [facility] Sandor Bird ([facility]) when recommending preventive services for our patients. Because we follow these guidelines, sometimes recommendations change over time as research supports it. (For example, mammograms used to be recommended annually. Even though Medicare will still pay for an annual mammogram, the newer guidelines recommend a mammogram every two years for women of average risk.) Of course, you and your provider may decide to screen more often for some diseases, based on your risk and co-morbidities (chronic disease you are already diagnosed with). Preventive services for you include: - Medicare offers their members a free annual wellness visit, which is time for you and your primary care provider to discuss and plan for your preventive service needs. Take advantage of this benefit every year! 
 
-All people over age 72 should receive the recommended pneumonia vaccines. Current USPSTF guidelines recommend a series of two vaccines for the best pneumonia protection.  
 
-All adults should have a yearly flu vaccine and a tetanus vaccine every 10 years. All adults age 61 years should receive a shingles vaccine once in their lifetime.   
 
-A bone mass density test is recommended when a woman turns 65 to screen for osteoporosis. This test is only recommended once as a screening. Some providers will use this same test as a disease monitoring tool if you already have osteoporosis.  
 
-All adults age 38-68 years who are overweight should have a diabetes screening test once every three years.  
 
-Other screening tests & preventive services for persons with diabetes include: an eye exam to screen for diabetic retinopathy, a kidney function test, a foot exam, and stricter control over your cholesterol.  
 
-Cardiovascular screening for adults with routine risk involves an electrocardiogram (ECG) at intervals determined by the provider.  
 
-Colorectal cancer screenings should be done for adults age 54-65 years with normal risk. There are a number of acceptable methods of screening for this type of cancer. Each test has its own benefits and drawbacks. Discuss with your provider what is most appropriate for you during your annual wellness visit. The different tests include: colonoscopy (considered the best screening method), a fecal occult blood test, a fecal DNA test, and sigmoidoscopy. -Breast cancer screenings are recommended every other year for women of normal risk age 54-69 years.  
 
-Cervical cancer screenings for women over age 72 are only recommended with certain risk factors.  
 
-All adults born between [facility] should be screened once for Hepatitis C. Here is a list of your current Health Maintenance items (your personalized list of preventive services) with a due date: 
Health Maintenance Due Topic Date Due  Glaucoma Screening   04/08/2017 Re Baird Annual Well Visit  06/13/2018  Flu Vaccine  08/01/2018 Dr. Melly Corona Introducing [facility]! Dear Bertram Solorio: Thank you for requesting a [employer] account. Our records indicate that you already have an active [employer] account. You can access your account anytime at [email or web address] Did you know that you can access your hospital and ER discharge instructions at any time in [employer]? You can also review all of your test results from your hospital stay or ER visit. Additional Information If you have questions, please visit the Frequently Asked Questions section of the [employer] website at [email or web address] Remember, [employer] is NOT to be used for urgent needs. For medical emergencies, dial 911. Now available from your iPhone and Android! Please provide this summary of care documentation to your next provider. Your primary care clinician is listed as Diana Cortés. If you have any questions after today's visit, please call [phone].

## 2018-08-09 NOTE — PROGRESS NOTES
Roxana Tolbert is a 80 y.o. female who presents today for Annual Wellness Visit  . She has a history of   Patient Active Problem List   Diagnosis Code    HTN (hypertension) I10    IBS (irritable bowel syndrome) K58.9    Cataract H26.9    Diverticulosis K57.90    Atrial fibrillation (HCC) I48.91    Chronic cystitis N30.20    Chronic anticoagulation Z79.01    Advanced care planning/counseling discussion Z71.89   . Today patient is here for f/u and MW.   she does have other concerns. L back pain: gets worse as the day goes on. Heat does help. Has been getting worse. No radiation down her legs. Fall: mechanical. Hit her hip and had some bruising. No LOC and Dizziness. Hypertension - no changes needed  Hypertension ROS: taking medications as instructed, no medication side effects noted, no TIA's, no chest pain on exertion, no dyspnea on exertion, no swelling of ankles     reports that she has quit smoking. She has never used smokeless tobacco.    reports that she drinks alcohol. BP Readings from Last 2 Encounters:   08/09/18 120/75   05/08/18 133/74     Anticoagulation  Patient here for followup of chronic anticoagulation. Indication: Atrial Fibrillation. Bleeding Signs/Symptoms:  None. Compliance with warfarin:  Yes  INR's are drawn regularly and have been Therapeutic. Lab Results   Component Value Date/Time    INR 1.9 (H) 07/11/2018 12:44 PM    INR 1.7 (H) 06/21/2018 01:17 PM    INR 2.3 (H) 03/20/2018 02:25 PM    INR POC 2.3 08/09/2018 02:41 PM    INR POC 1.9 05/08/2018 02:45 PM    Prothrombin time 19.2 (H) 07/11/2018 12:44 PM    Prothrombin time 17.1 (H) 06/21/2018 01:17 PM    Prothrombin time 23.1 (H) 03/20/2018 02:25 PM         Health maintenance hx includes:  Exercise: moderately active. Form of exercise: going to the store. Diet: generally follows a low fat low cholesterol diet, nonsmoker, alcohol intake none  Social: Lives with daughter.      Screening:    Colon cancer screening:  N/A   Breast cancer screening: N/A   Cervical cancer screening: last PAP/Pelvic exam: N/A   Osteoporosis screening:  Last BMD:  Will order DEXA. Immunizations:     Immunization History   Administered Date(s) Administered    Influenza High Dose Vaccine PF 11/01/2016, 10/12/2017    Influenza Vaccine (Quad) PF 10/05/2015    Pneumococcal Conjugate (PCV-13) 09/01/2015    Pneumococcal Polysaccharide (PPSV-23) 10/01/2002    Tdap 09/01/2015    Zoster Vaccine, Live 06/01/2009      Immunization status: up to date and documented. ROS  Review of Systems   Constitutional: Negative for chills, fever and weight loss. HENT: Negative for congestion and sore throat. Eyes: Negative for blurred vision, double vision, photophobia, pain and discharge. Respiratory: Negative for cough, hemoptysis, sputum production and shortness of breath. Cardiovascular: Negative for chest pain, palpitations and leg swelling. Gastrointestinal: Positive for constipation. Negative for abdominal pain, diarrhea, heartburn, nausea and vomiting. Genitourinary: Negative for dysuria, frequency, hematuria and urgency. Musculoskeletal: Positive for back pain. Negative for falls, joint pain, myalgias and neck pain. Skin: Negative for rash. Neurological: Positive for dizziness (Mild) and tremors. Negative for tingling, sensory change, speech change, seizures and headaches. Endo/Heme/Allergies: Does not bruise/bleed easily. Psychiatric/Behavioral: Negative for depression, memory loss, substance abuse and suicidal ideas. The patient is not nervous/anxious. Visit Vitals    /75 (BP 1 Location: Left arm, BP Patient Position: Sitting)    Pulse 72    Temp 97.2 °F (36.2 °C) (Oral)    Resp 14    Ht 5' 6\" (1.676 m)    Wt 123 lb 12.8 oz (56.2 kg)    SpO2 99%    BMI 19.98 kg/m2       Physical Exam   Constitutional: She is oriented to person, place, and time. She appears well-developed and well-nourished. No distress. HENT:   Head: Normocephalic and atraumatic. Eyes: EOM are normal.   Neck: Normal range of motion. Neck supple. No thyromegaly present. Cardiovascular: Normal rate and regular rhythm. No murmur heard. Pulmonary/Chest: Effort normal and breath sounds normal. No respiratory distress. She has no wheezes. Abdominal: Soft. Bowel sounds are normal. She exhibits no distension. Musculoskeletal: She exhibits no edema. Arms:  Pain where noted. Normal strength and LE neuro exam.    Neurological: She is alert and oriented to person, place, and time. No cranial nerve deficit. Skin: Skin is warm and dry. Actinic keratosis to back   Psychiatric: She has a normal mood and affect. Her behavior is normal.         Current Outpatient Prescriptions   Medication Sig    warfarin (JANTOVEN) 5 mg tablet Take 5 mg by mouth daily.  ALPRAZolam (XANAX) 0.25 mg tablet Take 1 Tab by mouth two (2) times daily as needed for Anxiety. Max Daily Amount: 0.5 mg.    celecoxib (CELEBREX) 200 mg capsule TAKE ONE CAPSULE BY MOUTH ONE TIME DAILY AS NEEDED    nitrofurantoin (MACRODANTIN) 50 mg capsule TAKE ONE CAPSULE BY MOUTH ONE TIME DAILY    polyethylene glycol (MIRALAX) 17 gram packet MIX 1 PACKET IN LIQUID AND DRINK BY MOUTH DAILY    amLODIPine (NORVASC) 5 mg tablet TAKE ONE TABLET BY MOUTH ONE TIME DAILY    methocarbamol (ROBAXIN) 500 mg tablet Take 1 Tab by mouth four (4) times daily as needed.  lisinopril (PRINIVIL, ZESTRIL) 10 mg tablet TAKE ONE TABLET BY MOUTH ONE TIME DAILY    fluticasone (FLONASE) 50 mcg/actuation nasal spray 2 Sprays by Both Nostrils route daily.  bisoprolol-hydroCHLOROthiazide (ZIAC) 10-6.25 mg per tablet TAKE ONE TABLET BY MOUTH ONE TIME DAILY    cephALEXin (KEFLEX) 500 mg capsule     acetaminophen (TYLENOL EXTRA STRENGTH) 500 mg tablet Take  by mouth every six (6) hours as needed for Pain.     diclofenac (VOLTAREN) 1 % gel Apply 2 g to affected area four (4) times daily as needed.  AMITIZA 8 mcg capsule TAKE ONE CAPSULE BY MOUTH TWICE A DAY WITH MEALS (Patient taking differently: One tab every other day)    warfarin (COUMADIN) 5 mg tablet TAKE 1 TABLET BY MOUTH DAILY     No current facility-administered medications for this visit. Past Medical History:   Diagnosis Date    Diverticula, colon     Hypertension     IBS (irritable bowel syndrome)     Irregular heart beat       Past Surgical History:   Procedure Laterality Date    HX  SECTION      X 3    HX COLONOSCOPY  8/28/15    with endoscopy day before    HX HYSTERECTOMY        Social History   Substance Use Topics    Smoking status: Former Smoker    Smokeless tobacco: Never Used    Alcohol use Yes      Family History   Problem Relation Age of Onset    Tuberculosis Mother         Allergies   Allergen Reactions    Pcn [Penicillins] Hives        Assessment/Plan  Diagnoses and all orders for this visit:    1. Medicare annual wellness visit, subsequent - DEXA ordered. No cancer screening. Work on balance. Guillaume Garcia was counseled on age-appropriate/ guideline-based risk prevention behaviors and screening for a 80y.o. year old   female . We also discussed adjustments in screening based on family history if necessary. Printed instructions for preventative screening guidelines and healthy behaviors given to patient with after visit summary. 2. Essential hypertension - very well controlled. 3. Chronic atrial fibrillation (HCC) - INR at goal.   -     AMB POC PT/INR  -     PROTHROMBIN TIME + INR; Future    4. Advanced directives, counseling/discussion - urged to bring us copy. 5. Chronic left-sided thoracic back pain - Muscular in nature. Work with PT for this and for balance. -     REFERRAL TO PHYSICAL THERAPY    6. Balance problem  -     REFERRAL TO PHYSICAL THERAPY    7. Disorder of bone and cartilage - repeat. -     DEXA BONE DENSITY STUDY AXIAL; Future    8. Anxiety - rare use. Last Rx. In 2017.   -     ALPRAZolam (XANAX) 0.25 mg tablet; Take 1 Tab by mouth two (2) times daily as needed for Anxiety. Max Daily Amount: 0.5 mg.    9. Actinic keratosis - noted to back. 8. Fall, initial encounter - One mechanical fall. Admits to dizziness. Work with PT. Follow-up Disposition:  Return in about 3 months (around 2018). Diana Cortés MD  2018          This is the Subsequent Medicare Annual Wellness Exam, performed 12 months or more after the Initial AWV or the last Subsequent AWV    I have reviewed the patient's medical history in detail and updated the computerized patient record. History     Past Medical History:   Diagnosis Date    Diverticula, colon     Hypertension     IBS (irritable bowel syndrome)     Irregular heart beat       Past Surgical History:   Procedure Laterality Date    HX  SECTION      X 3    HX COLONOSCOPY  8/28/15    with endoscopy day before    HX HYSTERECTOMY       Current Outpatient Prescriptions   Medication Sig Dispense Refill    warfarin (JANTOVEN) 5 mg tablet Take 5 mg by mouth daily.  ALPRAZolam (XANAX) 0.25 mg tablet Take 1 Tab by mouth two (2) times daily as needed for Anxiety. Max Daily Amount: 0.5 mg. 30 Tab 0    celecoxib (CELEBREX) 200 mg capsule TAKE ONE CAPSULE BY MOUTH ONE TIME DAILY AS NEEDED 30 Cap 2    nitrofurantoin (MACRODANTIN) 50 mg capsule TAKE ONE CAPSULE BY MOUTH ONE TIME DAILY 90 Cap 1    polyethylene glycol (MIRALAX) 17 gram packet MIX 1 PACKET IN LIQUID AND DRINK BY MOUTH DAILY 100 Packet 1    amLODIPine (NORVASC) 5 mg tablet TAKE ONE TABLET BY MOUTH ONE TIME DAILY 90 Tab 1    methocarbamol (ROBAXIN) 500 mg tablet Take 1 Tab by mouth four (4) times daily as needed. 60 Tab 1    lisinopril (PRINIVIL, ZESTRIL) 10 mg tablet TAKE ONE TABLET BY MOUTH ONE TIME DAILY 90 Tab 1    fluticasone (FLONASE) 50 mcg/actuation nasal spray 2 Sprays by Both Nostrils route daily.  1 Bottle 6    bisoprolol-hydroCHLOROthiazide (ZIAC) 10-6.25 mg per tablet TAKE ONE TABLET BY MOUTH ONE TIME DAILY 90 Tab 1    cephALEXin (KEFLEX) 500 mg capsule       acetaminophen (TYLENOL EXTRA STRENGTH) 500 mg tablet Take  by mouth every six (6) hours as needed for Pain.  diclofenac (VOLTAREN) 1 % gel Apply 2 g to affected area four (4) times daily as needed.  100 g 3    AMITIZA 8 mcg capsule TAKE ONE CAPSULE BY MOUTH TWICE A DAY WITH MEALS (Patient taking differently: One tab every other day) 60 Cap 5    warfarin (COUMADIN) 5 mg tablet TAKE 1 TABLET BY MOUTH DAILY 30 Tab 5     Allergies   Allergen Reactions    Pcn [Penicillins] Hives     Family History   Problem Relation Age of Onset    Tuberculosis Mother      Social History   Substance Use Topics    Smoking status: Former Smoker    Smokeless tobacco: Never Used    Alcohol use Yes     Patient Active Problem List   Diagnosis Code    HTN (hypertension) I10    IBS (irritable bowel syndrome) K58.9    Cataract H26.9    Diverticulosis K57.90    Atrial fibrillation (HCC) I48.91    Chronic cystitis N30.20    Chronic anticoagulation Z79.01    Advanced care planning/counseling discussion Z71.89       Depression Risk Factor Screening:     PHQ over the last two weeks 8/9/2018   Little interest or pleasure in doing things Not at all   Feeling down, depressed, irritable, or hopeless Not at all   Total Score PHQ 2 0   Trouble falling or staying asleep, or sleeping too much -   Feeling tired or having little energy -   Poor appetite, weight loss, or overeating -   Feeling bad about yourself - or that you are a failure or have let yourself or your family down -   Trouble concentrating on things such as school, work, reading, or watching TV -   Moving or speaking so slowly that other people could have noticed; or the opposite being so fidgety that others notice -   Thoughts of being better off dead, or hurting yourself in some way -   PHQ 9 Score -   How difficult have these problems made it for you to do your work, take care of your home and get along with others -     Alcohol Risk Factor Screening: You do not drink alcohol or very rarely. Functional Ability and Level of Safety:   Hearing Loss  Hearing is good. Activities of Daily Living  The home contains: no safety equipment. Patient does total self care    Fall Risk  Fall Risk Assessment, last 12 mths 5/8/2018   Able to walk? Yes   Fall in past 12 months? No   Fall with injury? -   Number of falls in past 12 months -   Fall Risk Score -       Abuse Screen  Patient is not abused    Cognitive Screening   Evaluation of Cognitive Function:  Has your family/caregiver stated any concerns about your memory: no  Normal    Patient Care Team   Patient Care Team:  Bhargavi Harris MD as PCP - General (Internal Medicine)    Assessment/Plan   Education and counseling provided:  Are appropriate based on today's review and evaluation  End-of-Life planning (with patient's consent)  Pneumococcal Vaccine  Colorectal cancer screening tests  Bone mass measurement (DEXA)    Diagnoses and all orders for this visit:    1. Chronic atrial fibrillation ([address]    2. Essential hypertension    3. Medicare annual wellness visit, subsequent    4.  Advanced directives, counseling/discussion    Other orders  -     CBC WITH AUTOMATED DIFF  -     METABOLIC PANEL, COMPREHENSIVE  -     PROTHROMBIN TIME + INR        Health Maintenance Due   Topic Date Due    GLAUCOMA SCREENING Q2Y  04/08/2017    Influenza Age 5 to Adult  08/01/2018

## 2018-08-09 NOTE — PATIENT INSTRUCTIONS
Medicare Wellness Visit, Female    The best way to live healthy is to have a lifestyle where you eat a well-balanced diet, exercise regularly, limit alcohol use, and quit all forms of tobacco/nicotine, if applicable. Regular preventive services are another way to keep healthy. Preventive services (vaccines, screening tests, monitoring & exams) can help personalize your care plan, which helps you manage your own care. Screening tests can find health problems at the earliest stages, when they are easiest to treat. [employer] follows the current, evidence-based guidelines published by the [facility] Sandor Pelon ([facility]) when recommending preventive services for our patients. Because we follow these guidelines, sometimes recommendations change over time as research supports it. (For example, mammograms used to be recommended annually. Even though Medicare will still pay for an annual mammogram, the newer guidelines recommend a mammogram every two years for women of average risk.)    Of course, you and your provider may decide to screen more often for some diseases, based on your risk and co-morbidities (chronic disease you are already diagnosed with). Preventive services for you include:    - Medicare offers their members a free annual wellness visit, which is time for you and your primary care provider to discuss and plan for your preventive service needs. Take advantage of this benefit every year!    -All people over age 72 should receive the recommended pneumonia vaccines. Current USPSTF guidelines recommend a series of two vaccines for the best pneumonia protection.     -All adults should have a yearly flu vaccine and a tetanus vaccine every 10 years. All adults age 61 years should receive a shingles vaccine once in their lifetime.      -A bone mass density test is recommended when a woman turns 65 to screen for osteoporosis.  This test is only recommended once as a screening. Some providers will use this same test as a disease monitoring tool if you already have osteoporosis. -All adults age 38-68 years who are overweight should have a diabetes screening test once every three years.     -Other screening tests & preventive services for persons with diabetes include: an eye exam to screen for diabetic retinopathy, a kidney function test, a foot exam, and stricter control over your cholesterol.     -Cardiovascular screening for adults with routine risk involves an electrocardiogram (ECG) at intervals determined by the provider.     -Colorectal cancer screenings should be done for adults age 54-65 years with normal risk. There are a number of acceptable methods of screening for this type of cancer. Each test has its own benefits and drawbacks. Discuss with your provider what is most appropriate for you during your annual wellness visit. The different tests include: colonoscopy (considered the best screening method), a fecal occult blood test, a fecal DNA test, and sigmoidoscopy. -Breast cancer screenings are recommended every other year for women of normal risk age 54-69 years.     -Cervical cancer screenings for women over age 72 are only recommended with certain risk factors.     -All adults born between [facility] should be screened once for Hepatitis C.      Here is a list of your current Health Maintenance items (your personalized list of preventive services) with a due date:  Health Maintenance Due   Topic Date Due    Glaucoma Screening   04/08/2017    Annual Well Visit  06/13/2018    Flu Vaccine  08/01/2018       Dr. Ammy Darby

## 2018-08-10 ENCOUNTER — TELEPHONE (OUTPATIENT)
Dept: INTERNAL MEDICINE CLINIC | Age: 83
End: 2018-08-10

## 2018-08-10 DIAGNOSIS — F41.9 ANXIETY: ICD-10-CM

## 2018-08-10 RX ORDER — ALPRAZOLAM 0.25 MG/1
0.25 TABLET ORAL
Qty: 30 TAB | Refills: 0 | Status: SHIPPED | OUTPATIENT
Start: 2018-08-10 | End: 2018-11-08 | Stop reason: SDUPTHER

## 2018-08-10 NOTE — TELEPHONE ENCOUNTER
----- Message from Lalit Donovan sent at 8/10/2018 10:11 AM EDT -----  Regarding: Dr. Kenn Krishnan [phone]   Pt stated, she was seen yesterday 08/09/18 at 1:30PM; seven (7) pages she received were lost which include a Rx for \"Xanax\" and several orders for physical therapy, bone density, balance and blood test.   Pt is requesting her daughter, Rajan Ibarra, p/up new orders and Xanax Rx on Monday, 08/13/18.

## 2018-09-23 DIAGNOSIS — I48.20 CHRONIC ATRIAL FIBRILLATION (HCC): ICD-10-CM

## 2018-09-26 RX ORDER — BISOPROLOL FUMARATE AND HYDROCHLOROTHIAZIDE 10; 6.25 MG/1; MG/1
TABLET ORAL
Qty: 90 TAB | Refills: 1 | Status: SHIPPED | OUTPATIENT
Start: 2018-09-26 | End: 2019-03-25 | Stop reason: SDUPTHER

## 2018-11-08 ENCOUNTER — OFFICE VISIT (OUTPATIENT)
Dept: INTERNAL MEDICINE CLINIC | Age: 83
End: 2018-11-08

## 2018-11-08 VITALS
OXYGEN SATURATION: 98 % | DIASTOLIC BLOOD PRESSURE: 90 MMHG | WEIGHT: 121 LBS | RESPIRATION RATE: 18 BRPM | TEMPERATURE: 97.7 F | HEIGHT: 66 IN | SYSTOLIC BLOOD PRESSURE: 126 MMHG | BODY MASS INDEX: 19.44 KG/M2 | HEART RATE: 59 BPM

## 2018-11-08 DIAGNOSIS — I10 ESSENTIAL HYPERTENSION: ICD-10-CM

## 2018-11-08 DIAGNOSIS — Z23 ENCOUNTER FOR IMMUNIZATION: ICD-10-CM

## 2018-11-08 DIAGNOSIS — N30.20 CHRONIC CYSTITIS: ICD-10-CM

## 2018-11-08 DIAGNOSIS — Z79.01 WARFARIN ANTICOAGULATION: Primary | ICD-10-CM

## 2018-11-08 DIAGNOSIS — M62.838 MUSCLE SPASM: ICD-10-CM

## 2018-11-08 DIAGNOSIS — M54.2 NECK PAIN: ICD-10-CM

## 2018-11-08 DIAGNOSIS — F41.9 ANXIETY: ICD-10-CM

## 2018-11-08 DIAGNOSIS — I48.20 CHRONIC ATRIAL FIBRILLATION (HCC): ICD-10-CM

## 2018-11-08 LAB
INR BLD: 2.7
PT POC: 20 SECONDS
VALID INTERNAL CONTROL?: YES

## 2018-11-08 RX ORDER — METHYLPREDNISOLONE 4 MG/1
TABLET ORAL
COMMUNITY
End: 2019-02-07

## 2018-11-08 RX ORDER — ALPRAZOLAM 0.25 MG/1
0.25 TABLET ORAL
Qty: 30 TAB | Refills: 0 | Status: SHIPPED | OUTPATIENT
Start: 2018-11-08 | End: 2018-12-08

## 2018-11-08 RX ORDER — DIAZEPAM 2 MG/1
2 TABLET ORAL
Qty: 5 TAB | Refills: 0 | Status: SHIPPED | OUTPATIENT
Start: 2018-11-08 | End: 2018-11-13

## 2018-11-08 NOTE — PROGRESS NOTES
All health maintenance and other pertinent information has been reviewed in preparation for today's office visit. Patient presents in the office today for: Chief Complaint Patient presents with  Follow-up Atrial fibrillation ([facility] [address] HTN, Arthritis 1. Have you been to the ER, urgent care clinic since your last visit? Hospitalized since your last visit? Yes. [facility] for tx of Arthritic neck pain. (11/3/18). 2. Have you seen or consulted any other health care providers outside of the [address] [place] [address] since your last visit? Include any pap smears or colon screening.  No

## 2018-11-08 NOTE — PATIENT INSTRUCTIONS
Neck: Exercises Your Care Instructions Here are some examples of typical rehabilitation exercises for your condition. Start each exercise slowly. Ease off the exercise if you start to have pain. Your doctor or physical therapist will tell you when you can start these exercises and which ones will work best for you. How to do the exercises Neck stretch 1. This stretch works best if you keep your shoulder down as you lean away from it. To help you remember to do this, start by relaxing your shoulders and lightly holding on to your thighs or your chair. 2. Tilt your head toward your shoulder and hold for 15 to 30 seconds. Let the weight of your head stretch your muscles. 3. If you would like a little added stretch, use your hand to gently and steadily pull your head toward your shoulder. For example, keeping your right shoulder down, lean your head to the left. 4. Repeat 2 to 4 times toward each shoulder. Diagonal neck stretch 1. Turn your head slightly toward the direction you will be stretching, and tilt your head diagonally toward your chest and hold for 15 to 30 seconds. 2. If you would like a little added stretch, use your hand to gently and steadily pull your head forward on the diagonal. 
3. Repeat 2 to 4 times toward each side. Dorsal glide stretch 1. Sit or stand tall and look straight ahead. 2. Slowly tuck your chin as you glide your head backward over your body 3. Hold for a count of 6, and then relax for up to 10 seconds. 4. Repeat 8 to 12 times. Chest and shoulder stretch 1. Sit or stand tall and glide your head backward as in the dorsal glide stretch. 2. Raise both arms so that your hands are next to your ears. 3. Take a deep breath, and as you breathe out, lower your elbows down and behind your back. You will feel your shoulder blades slide down and together, and at the same time you will feel a stretch across your chest and the front of your shoulders. 4. Hold for about 6 seconds, and then relax for up to 10 seconds. 5. Repeat 8 to 12 times. Strengthening: Hands on head 1. Move your head backward, forward, and side to side against gentle pressure from your hands, holding each position for about 6 seconds. 2. Repeat 8 to 12 times. Follow-up care is a key part of your treatment and safety. Be sure to make and go to all appointments, and call your doctor if you are having problems. It's also a good idea to know your test results and keep a list of the medicines you take. Where can you learn more? Go to http://giuseppe-abi.info/. Enter P975 in the search box to learn more about \"Neck: Exercises. \" Current as of: November 29, 2017 Content Version: 11.8 © 7865-9523 [employer]. Care instructions adapted under license by [employer] (which disclaims liability or warranty for this information). If you have questions about a medical condition or this instruction, always ask your healthcare professional. [address] any warranty or liability for your use of this information.

## 2018-11-08 NOTE — PROGRESS NOTES
Parker Albrecht is a 80 y.o. female who presents today for Follow-up (Atrial fibrillation ([address] HTN, Arthritis) Kathryn Guerrero She has a history of  
Patient Active Problem List  
Diagnosis Code  
 HTN (hypertension) I10  
 IBS (irritable bowel syndrome) K58.9  Cataract H26.9  Diverticulosis K57.90  Atrial fibrillation (HCC) I48.91  
 Chronic cystitis N30.20  Chronic anticoagulation Z79.01  
 Advanced care planning/counseling discussion Z71.89 Kathryn Guerrero Today patient is here for f/u. Neck pain: continuing to be an issue. This is worse at the end of the day. Muscular pain with radiation. Worse by the end of the day. Heat does help. No numbness or tingling in fingers. Taking some robaxin. Seen by pt first, placed on steroids. Anticoagulation Patient here for followup of chronic anticoagulation. Dose was increased to 5 mg in June of this year. Indication: Atrial Fibrillation. Bleeding Signs/Symptoms:  None. Compliance with warfarin:  Yes INR's are drawn regularly and have been Therapeutic. Lab Results Component Value Date/Time INR 1.9 (H) 07/11/2018 12:44 PM  
 INR 1.7 (H) 06/21/2018 01:17 PM  
 INR 2.3 (H) 03/20/2018 02:25 PM  
 INR POC 2.7 11/08/2018 03:10 PM  
 INR POC 2.3 08/09/2018 02:41 PM  
 INR POC 1.9 05/08/2018 02:45 PM  
 Prothrombin time 19.2 (H) 07/11/2018 12:44 PM  
 Prothrombin time 17.1 (H) 06/21/2018 01:17 PM  
 Prothrombin time 23.1 (H) 03/20/2018 02:25 PM  
 
Hypertension - stable. Hypertension ROS: taking medications as instructed, no medication side effects noted, no TIA's, no chest pain on exertion, no dyspnea on exertion, no swelling of ankles     reports that she has quit smoking. she has never used smokeless tobacco.    reports that she drinks about 0.6 oz of alcohol per week. BP Readings from Last 2 Encounters:  
11/08/18 126/90  
08/09/18 120/75 Patient has lost a couple pounds. Discussed watching and monitoring weight. ROS Review of Systems Constitutional: Positive for weight loss. Negative for chills, diaphoresis, fever and malaise/fatigue. Respiratory: Negative for cough and shortness of breath. Cardiovascular: Negative for chest pain, palpitations and leg swelling. Gastrointestinal: Negative for abdominal pain, nausea and vomiting. Genitourinary: Negative for dysuria, frequency and urgency. Musculoskeletal: Positive for neck pain. Neurological: Negative. Endo/Heme/Allergies: Does not bruise/bleed easily. Psychiatric/Behavioral: Negative for depression and suicidal ideas. The patient is not nervous/anxious. Visit Vitals /90 (BP 1 Location: Left arm, BP Patient Position: Sitting) Pulse (!) 59 Temp 97.7 °F (36.5 °C) (Oral) Resp 18 Ht 5' 6\" (1.676 m) Wt 121 lb (54.9 kg) SpO2 98% BMI 19.53 kg/m² Physical Exam  
Constitutional: She is oriented to person, place, and time. She appears well-developed and well-nourished. HENT:  
Head: Normocephalic and atraumatic. Pain where noted. Neck:  
No actual point tenderness over cervical spine. Pain were noted in figure. Some radiation to the back of the head. Normal upper extremity strength and deep tendon reflexes. No deformity noted Cardiovascular: Normal rate. No murmur heard. Irregular Pulmonary/Chest: Effort normal. No respiratory distress. Abdominal: Soft. Bowel sounds are normal. She exhibits no distension. There is no tenderness. Neurological: She is alert and oriented to person, place, and time. Skin: Skin is warm and dry. Psychiatric: She has a normal mood and affect. Her behavior is normal.  
 
 
 
Current Outpatient Medications Medication Sig  
 diazePAM (VALIUM) 2 mg tablet Take 1 Tab by mouth nightly for 5 days. Max Daily Amount: 2 mg.  ALPRAZolam (XANAX) 0.25 mg tablet Take 1 Tab by mouth two (2) times daily as needed for Anxiety for up to 30 days.  Max Daily Amount: 0.5 mg.  
  bisoprolol-hydroCHLOROthiazide (ZIAC) 10-6.25 mg per tablet TAKE ONE TABLET BY MOUTH ONE TIME DAILY  warfarin (JANTOVEN) 5 mg tablet Take 5 mg by mouth daily.  celecoxib (CELEBREX) 200 mg capsule TAKE ONE CAPSULE BY MOUTH ONE TIME DAILY AS NEEDED  
 nitrofurantoin (MACRODANTIN) 50 mg capsule TAKE ONE CAPSULE BY MOUTH ONE TIME DAILY  polyethylene glycol (MIRALAX) 17 gram packet MIX 1 PACKET IN LIQUID AND DRINK BY MOUTH DAILY  amLODIPine (NORVASC) 5 mg tablet TAKE ONE TABLET BY MOUTH ONE TIME DAILY  lisinopril (PRINIVIL, ZESTRIL) 10 mg tablet TAKE ONE TABLET BY MOUTH ONE TIME DAILY  fluticasone (FLONASE) 50 mcg/actuation nasal spray 2 Sprays by Both Nostrils route daily.  acetaminophen (TYLENOL EXTRA STRENGTH) 500 mg tablet Take  by mouth every six (6) hours as needed for Pain.  diclofenac (VOLTAREN) 1 % gel Apply 2 g to affected area four (4) times daily as needed.  methylPREDNISolone (MEDROL, REBA,) 4 mg tablet Medrol (Reba) 4 mg tablets in a dose pack 
 take as directed  warfarin (COUMADIN) 5 mg tablet TAKE 1 TABLET BY MOUTH DAILY  methocarbamol (ROBAXIN) 500 mg tablet Take 1 Tab by mouth four (4) times daily as needed.  AMITIZA 8 mcg capsule TAKE ONE CAPSULE BY MOUTH TWICE A DAY WITH MEALS (Patient taking differently: One tab every other day) No current facility-administered medications for this visit. Past Medical History:  
Diagnosis Date  Diverticula, colon  Hypertension  IBS (irritable bowel syndrome)  Irregular heart beat Past Surgical History:  
Procedure Laterality Date  HX  SECTION    
 X 3  
 HX COLONOSCOPY  8/28/15  
 with endoscopy day before  HX HYSTERECTOMY Social History Tobacco Use  Smoking status: Former Smoker  Smokeless tobacco: Never Used Substance Use Topics  Alcohol use: Yes Alcohol/week: 0.6 oz Types: 1 Glasses of wine per week Family History Problem Relation Age of Onset  Tuberculosis Mother Allergies Allergen Reactions  Pcn [Penicillins] Hives Assessment/Plan Diagnoses and all orders for this visit: 
 
1. Warfarin anticoagulation -  INR at goal continue 5 mg. 
-     AMB POC PT/INR 
-     PROTHROMBIN TIME + INR; Future 2. Chronic atrial fibrillation (HCC) 
-     CBC WITH AUTOMATED DIFF; Future -     METABOLIC PANEL, BASIC; Future 
-     PROTHROMBIN TIME + INR; Future 3. Essential hypertension -at goal no changes needed. -     CBC WITH AUTOMATED DIFF; Future -     METABOLIC PANEL, BASIC; Future 4. Chronic cystitis -no recent UTIs. Continue prophylactic Macrobid dose 5. Neck pain -continued pain to the neck. No help with prednisone. Does not have any radicular symptoms. Suggest continued muscle relaxers and several days of diazepam at bedtime. Provided with stretches. If not better will send to PT. 
-     diazePAM (VALIUM) 2 mg tablet; Take 1 Tab by mouth nightly for 5 days. Max Daily Amount: 2 mg. 6. Muscle spasm 
-     diazePAM (VALIUM) 2 mg tablet; Take 1 Tab by mouth nightly for 5 days. Max Daily Amount: 2 mg. 7. Anxiety -very rarely uses.  verified -     ALPRAZolam (XANAX) 0.25 mg tablet; Take 1 Tab by mouth two (2) times daily as needed for Anxiety for up to 30 days. Max Daily Amount: 0.5 mg. 
 
8. Encounter for immunization 
-     INFLUENZA VIRUS VACCINE, HIGH DOSE SEASONAL, PRESERVATIVE FREE 
-     NM IMMUNIZ ADMIN,1 SINGLE/COMB VAC/TOXOID Follow-up Disposition: 
Return in about 3 months (around 2/8/2019). Jeannette Perez MD 
11/8/2018

## 2018-11-09 DIAGNOSIS — R29.898 WEAKNESS OF RIGHT HAND: ICD-10-CM

## 2018-11-09 DIAGNOSIS — M25.511 RIGHT SHOULDER PAIN, UNSPECIFIED CHRONICITY: ICD-10-CM

## 2018-11-09 DIAGNOSIS — M79.601 RIGHT ARM PAIN: ICD-10-CM

## 2018-11-09 RX ORDER — DICLOFENAC SODIUM 10 MG/G
GEL TOPICAL
Qty: 100 G | Refills: 2 | Status: SHIPPED | OUTPATIENT
Start: 2018-11-09 | End: 2019-05-09 | Stop reason: ALTCHOICE

## 2018-11-12 RX ORDER — LISINOPRIL 10 MG/1
TABLET ORAL
Qty: 90 TAB | Refills: 1 | Status: SHIPPED | OUTPATIENT
Start: 2018-11-12 | End: 2019-05-14 | Stop reason: SDUPTHER

## 2018-11-28 ENCOUNTER — TELEPHONE (OUTPATIENT)
Dept: INTERNAL MEDICINE CLINIC | Age: 83
End: 2018-11-28

## 2018-11-28 NOTE — TELEPHONE ENCOUNTER
Patient called to report that she is having horrible back spasms & the muscle relaxer's she has are not working.  Stated she would like to speak to nurse regarding as well as get a order to see a physical therapist.     Please call patient back at [phone]

## 2018-11-29 DIAGNOSIS — M54.50 CHRONIC BILATERAL LOW BACK PAIN WITHOUT SCIATICA: ICD-10-CM

## 2018-11-29 DIAGNOSIS — G89.29 CHRONIC BILATERAL LOW BACK PAIN WITHOUT SCIATICA: ICD-10-CM

## 2018-11-29 DIAGNOSIS — M62.838 MUSCLE SPASMS OF NECK: Primary | ICD-10-CM

## 2018-11-29 RX ORDER — METHOCARBAMOL 500 MG/1
500 TABLET, FILM COATED ORAL
Qty: 60 TAB | Refills: 1 | Status: SHIPPED | OUTPATIENT
Start: 2018-11-29 | End: 2019-02-07 | Stop reason: SDUPTHER

## 2018-12-12 ENCOUNTER — HOSPITAL ENCOUNTER (OUTPATIENT)
Dept: LAB | Age: 83
Discharge: HOME | End: 2018-12-12
Payer: MEDICARE

## 2018-12-12 PROCEDURE — 36415 COLL VENOUS BLD VENIPUNCTURE: CPT

## 2018-12-12 PROCEDURE — 85610 PROTHROMBIN TIME: CPT

## 2018-12-12 PROCEDURE — 85025 COMPLETE CBC W/AUTO DIFF WBC: CPT

## 2018-12-12 PROCEDURE — 80048 BASIC METABOLIC PNL TOTAL CA: CPT

## 2018-12-13 LAB
BASOPHILS # BLD AUTO: 0 X10E3/UL (ref 0–0.2)
BASOPHILS NFR BLD AUTO: 1 %
BUN SERPL-MCNC: 15 MG/DL (ref 8–27)
BUN/CREAT SERPL: 24 (ref 12–28)
CALCIUM SERPL-MCNC: 9.6 MG/DL (ref 8.7–10.3)
CHLORIDE SERPL-SCNC: 102 MMOL/L (ref 96–106)
CO2 SERPL-SCNC: 28 MMOL/L (ref 20–29)
CREAT SERPL-MCNC: 0.62 MG/DL (ref 0.57–1)
EOSINOPHIL # BLD AUTO: 0.1 X10E3/UL (ref 0–0.4)
EOSINOPHIL NFR BLD AUTO: 1 %
ERYTHROCYTE [DISTWIDTH] IN BLOOD BY AUTOMATED COUNT: 14.5 % (ref 12.3–15.4)
GLUCOSE SERPL-MCNC: 104 MG/DL (ref 65–99)
HCT VFR BLD AUTO: 41 % (ref 34–46.6)
HGB BLD-MCNC: 14 G/DL (ref 11.1–15.9)
IMM GRANULOCYTES # BLD: 0 X10E3/UL (ref 0–0.1)
IMM GRANULOCYTES NFR BLD: 0 %
INR PPP: 2.4 (ref 0.8–1.2)
LYMPHOCYTES # BLD AUTO: 0.9 X10E3/UL (ref 0.7–3.1)
LYMPHOCYTES NFR BLD AUTO: 20 %
MCH RBC QN AUTO: 33 PG (ref 26.6–33)
MCHC RBC AUTO-ENTMCNC: 34.1 G/DL (ref 31.5–35.7)
MCV RBC AUTO: 97 FL (ref 79–97)
MONOCYTES # BLD AUTO: 0.5 X10E3/UL (ref 0.1–0.9)
MONOCYTES NFR BLD AUTO: 12 %
NEUTROPHILS # BLD AUTO: 2.9 X10E3/UL (ref 1.4–7)
NEUTROPHILS NFR BLD AUTO: 66 %
PLATELET # BLD AUTO: 179 X10E3/UL (ref 150–379)
POTASSIUM SERPL-SCNC: 4.1 MMOL/L (ref 3.5–5.2)
PROTHROMBIN TIME: 23.3 SEC (ref 9.1–12)
RBC # BLD AUTO: 4.24 X10E6/UL (ref 3.77–5.28)
SODIUM SERPL-SCNC: 143 MMOL/L (ref 134–144)
WBC # BLD AUTO: 4.4 X10E3/UL (ref 3.4–10.8)

## 2018-12-13 NOTE — PROGRESS NOTES
Please inform her that all her blood work is stable keep same dose of Coumadin.   I will send these results to her home

## 2018-12-22 DIAGNOSIS — I10 ESSENTIAL HYPERTENSION: ICD-10-CM

## 2018-12-22 DIAGNOSIS — Z79.01 WARFARIN ANTICOAGULATION: ICD-10-CM

## 2018-12-22 DIAGNOSIS — I48.20 CHRONIC ATRIAL FIBRILLATION (HCC): ICD-10-CM

## 2018-12-24 DIAGNOSIS — I10 ESSENTIAL HYPERTENSION: ICD-10-CM

## 2018-12-24 RX ORDER — AMLODIPINE BESYLATE 5 MG/1
TABLET ORAL
Qty: 90 TAB | Refills: 1 | Status: SHIPPED | OUTPATIENT
Start: 2018-12-24 | End: 2019-06-05 | Stop reason: SDUPTHER

## 2019-01-04 DIAGNOSIS — R52 PAIN: ICD-10-CM

## 2019-01-04 RX ORDER — CELECOXIB 200 MG/1
CAPSULE ORAL
Qty: 30 CAP | Refills: 2 | Status: SHIPPED | OUTPATIENT
Start: 2019-01-04 | End: 2019-07-05 | Stop reason: SDUPTHER

## 2019-01-14 DIAGNOSIS — K58.1 IRRITABLE BOWEL SYNDROME WITH CONSTIPATION: ICD-10-CM

## 2019-01-14 RX ORDER — POLYETHYLENE GLYCOL 3350 17 G/17G
POWDER, FOR SOLUTION ORAL
Qty: 100 PACKET | Refills: 1 | Status: ON HOLD | OUTPATIENT
Start: 2019-01-14 | End: 2020-02-14

## 2019-01-28 DIAGNOSIS — D68.9 COAGULATION DISORDER (HCC): ICD-10-CM

## 2019-01-28 DIAGNOSIS — Z79.01 ANTICOAGULATION GOAL OF INR 2 TO 3: ICD-10-CM

## 2019-01-28 DIAGNOSIS — Z51.81 ANTICOAGULATION GOAL OF INR 2 TO 3: ICD-10-CM

## 2019-01-28 RX ORDER — WARFARIN SODIUM 5 MG/1
TABLET ORAL
Qty: 90 TAB | Refills: 1 | Status: SHIPPED | OUTPATIENT
Start: 2019-01-28 | End: 2019-07-31 | Stop reason: SDUPTHER

## 2019-02-07 ENCOUNTER — OFFICE VISIT (OUTPATIENT)
Dept: INTERNAL MEDICINE CLINIC | Age: 84
End: 2019-02-07

## 2019-02-07 VITALS
DIASTOLIC BLOOD PRESSURE: 82 MMHG | HEART RATE: 80 BPM | HEIGHT: 66 IN | BODY MASS INDEX: 20.25 KG/M2 | SYSTOLIC BLOOD PRESSURE: 136 MMHG | TEMPERATURE: 97.7 F | OXYGEN SATURATION: 98 % | WEIGHT: 126 LBS | RESPIRATION RATE: 16 BRPM

## 2019-02-07 DIAGNOSIS — Z79.01 CHRONIC ANTICOAGULATION: ICD-10-CM

## 2019-02-07 DIAGNOSIS — I10 ESSENTIAL HYPERTENSION: Primary | ICD-10-CM

## 2019-02-07 DIAGNOSIS — D68.9 COAGULATION DISORDER (HCC): ICD-10-CM

## 2019-02-07 DIAGNOSIS — M62.830 BACK MUSCLE SPASM: ICD-10-CM

## 2019-02-07 DIAGNOSIS — I48.20 CHRONIC ATRIAL FIBRILLATION (HCC): ICD-10-CM

## 2019-02-07 LAB
INR BLD: 2.4
PT POC: 29.3 SECONDS
VALID INTERNAL CONTROL?: YES

## 2019-02-07 RX ORDER — METHOCARBAMOL 500 MG/1
500 TABLET, FILM COATED ORAL
Qty: 120 TAB | Refills: 2 | Status: SHIPPED | OUTPATIENT
Start: 2019-02-07 | End: 2019-08-19 | Stop reason: SDUPTHER

## 2019-02-07 NOTE — PROGRESS NOTES
Ansley Busby is a 80 y.o. female who presents today for Hypertension; Irregular Heart Beat; and Coagulation disorder Ria Esparza She has a history of  
Patient Active Problem List  
Diagnosis Code  
 HTN (hypertension) I10  
 IBS (irritable bowel syndrome) K58.9  Cataract H26.9  Diverticulosis K57.90  Atrial fibrillation (HCC) I48.91  
 Chronic cystitis N30.20  Chronic anticoagulation Z79.01  
 Advanced care planning/counseling discussion Z71.89 Ria Esparza Today patient is here for a follow-up. Recently turned 89y. o., went to play put put. Neck pain: Patient is complaining of continued neck pain at last visit. She is been continuing to take her Celebrex. We discussed risk benefit of Celebrex and risk for GI bleed given the fact that she is on Coumadin. I provided her with Robaxin and stretches to do. Since she notes that her neck is better, but her R mid back is spasming at times. Over the last couple days she is been taken twice daily Robaxin which seems to be helping. Hypertension - Stable on current therapy. Hypertension ROS: taking medications as instructed, no medication side effects noted, no TIA's, no chest pain on exertion, no dyspnea on exertion, no swelling of ankles     reports that she has quit smoking. she has never used smokeless tobacco.    reports that she drinks about 0.6 oz of alcohol per week. BP Readings from Last 2 Encounters:  
02/07/19 136/82  
11/08/18 126/90 Anticoagulation Patient here for followup of chronic anticoagulation. Indication: Atrial Fibrillation. Bleeding Signs/Symptoms:  None. Compliance with warfarin:  Yes INR's are drawn regularly and have been Therapeutic. Lab Results Component Value Date/Time  INR 2.4 (H) 12/12/2018 12:52 PM  
 INR 1.9 (H) 07/11/2018 12:44 PM  
 INR 1.7 (H) 06/21/2018 01:17 PM  
 INR POC 2.4 02/07/2019 03:08 PM  
 INR POC 2.7 11/08/2018 03:10 PM  
 INR POC 2.3 08/09/2018 02:41 PM  
 Prothrombin time 23.3 (H) 12/12/2018 12:52 PM  
 Prothrombin time 19.2 (H) 07/11/2018 12:44 PM  
 Prothrombin time 17.1 (H) 06/21/2018 01:17 PM  
 
 
ROS Review of Systems Constitutional: Negative for chills, fever, malaise/fatigue and weight loss. HENT: Negative for congestion, ear discharge, ear pain, hearing loss, nosebleeds, sinus pain and tinnitus. Respiratory: Negative for cough, hemoptysis, sputum production, shortness of breath, wheezing and stridor. Cardiovascular: Negative for chest pain, palpitations, orthopnea and leg swelling. Gastrointestinal: Negative for abdominal pain, constipation, diarrhea, heartburn, nausea and vomiting. Genitourinary: Negative for dysuria, frequency, hematuria and urgency. Musculoskeletal: Positive for back pain, myalgias and neck pain. Skin: Negative for itching and rash. Neurological: Negative. Endo/Heme/Allergies: Does not bruise/bleed easily. Psychiatric/Behavioral: Negative for depression, hallucinations, substance abuse and suicidal ideas. The patient is not nervous/anxious and does not have insomnia. Visit Vitals /82 (BP 1 Location: Left arm, BP Patient Position: Sitting) Pulse 80 Temp 97.7 °F (36.5 °C) (Oral) Resp 16 Ht 5' 6\" (1.676 m) Wt 126 lb (57.2 kg) SpO2 98% BMI 20.34 kg/m² Physical Exam  
Constitutional: She is oriented to person, place, and time. She appears well-developed and well-nourished. HENT:  
Head: Normocephalic and atraumatic. Right Ear: External ear normal.  
Left Ear: External ear normal.  
Eyes: EOM are normal. Pupils are equal, round, and reactive to light. Neck: Normal range of motion. Neck supple. Cardiovascular: Normal rate and regular rhythm. No murmur heard. Pulmonary/Chest: Effort normal. No stridor. No respiratory distress. She has no wheezes. Abdominal: Soft. Bowel sounds are normal. She exhibits no distension and no mass. There is no tenderness. There is no guarding. Musculoskeletal: Normal range of motion. She exhibits no edema. Neurological: She is alert and oriented to person, place, and time. Skin: Skin is warm and dry. Psychiatric: She has a normal mood and affect. Her behavior is normal.  
 
 
 
Current Outpatient Medications Medication Sig  
 methocarbamol (ROBAXIN) 500 mg tablet Take 1 Tab by mouth four (4) times daily as needed.  warfarin (JANTOVEN) 5 mg tablet TAKE ONE TABLET BY MOUTH ONE TIME DAILY  polyethylene glycol (MIRALAX) 17 gram packet MIX 1 PACKET IN LIQUID AND DRINK BY MOUTH DAILY  celecoxib (CELEBREX) 200 mg capsule TAKE ONE CAPSULE BY MOUTH ONE TIME DAILY AS NEEDED  
 amLODIPine (NORVASC) 5 mg tablet TAKE ONE TABLET BY MOUTH ONE TIME DAILY  lisinopril (PRINIVIL, ZESTRIL) 10 mg tablet TAKE ONE TABLET BY MOUTH ONE TIME DAILY  VOLTAREN 1 % gel APPLY 2 GRAMS TO THE AFFECTED AREA FOUR TIMES A DAY AS NEEDED  
 bisoprolol-hydroCHLOROthiazide (ZIAC) 10-6.25 mg per tablet TAKE ONE TABLET BY MOUTH ONE TIME DAILY  warfarin (JANTOVEN) 5 mg tablet Take 5 mg by mouth daily.  fluticasone (FLONASE) 50 mcg/actuation nasal spray 2 Sprays by Both Nostrils route daily.  acetaminophen (TYLENOL EXTRA STRENGTH) 500 mg tablet Take  by mouth every six (6) hours as needed for Pain.  AMITIZA 8 mcg capsule TAKE ONE CAPSULE BY MOUTH TWICE A DAY WITH MEALS (Patient taking differently: One tab every other day) No current facility-administered medications for this visit. Past Medical History:  
Diagnosis Date  Diverticula, colon  Hypertension  IBS (irritable bowel syndrome)  Irregular heart beat Past Surgical History:  
Procedure Laterality Date  HX  SECTION    
 X 3  
 HX COLONOSCOPY  8/28/15  
 with endoscopy day before  HX HYSTERECTOMY Social History Tobacco Use  Smoking status: Former Smoker  Smokeless tobacco: Never Used Substance Use Topics  Alcohol use:  Yes  
 Alcohol/week: 0.6 oz Types: 1 Glasses of wine per week Family History Problem Relation Age of Onset  Tuberculosis Mother Allergies Allergen Reactions  Pcn [Penicillins] Hives Assessment/Plan Diagnoses and all orders for this visit: 1. Essential hypertension - Stable. No changes needed. -     CBC WITH AUTOMATED DIFF; Future -     METABOLIC PANEL, BASIC; Future 2. Coagulation disorder ([address] -     AMB POC PT/INR 
 
3. Chronic atrial fibrillation (HCC) - INR today stable. -     PROTHROMBIN TIME + INR; Future -     CBC WITH AUTOMATED DIFF; Future -     METABOLIC PANEL, BASIC; Future 4. Chronic anticoagulation 
-     PROTHROMBIN TIME + INR; Future 5. Back muscle spasm - continue PRN daily robaxin. Suggest continued heat and PT. Consider massage.  
-     methocarbamol (ROBAXIN) 500 mg tablet; Take 1 Tab by mouth four (4) times daily as needed. Follow-up Disposition: 
Return in about 3 months (around 5/7/2019). Brooke Leggett MD 
2/7/2019

## 2019-02-11 DIAGNOSIS — K58.1 IRRITABLE BOWEL SYNDROME WITH CONSTIPATION: ICD-10-CM

## 2019-02-11 RX ORDER — POLYETHYLENE GLYCOL 3350 17 G/17G
POWDER, FOR SOLUTION ORAL
Qty: 100 PACKET | Refills: 1 | Status: SHIPPED | OUTPATIENT
Start: 2019-02-11 | End: 2019-08-19 | Stop reason: SDUPTHER

## 2019-02-12 ENCOUNTER — TELEPHONE (OUTPATIENT)
Dept: INTERNAL MEDICINE CLINIC | Age: 84
End: 2019-02-12

## 2019-02-12 NOTE — TELEPHONE ENCOUNTER
----- Message from Samir Collins sent at 2/12/2019  3:44 PM EST -----  Regarding: Dr. Sendy Lai  Pt stated, she was seen by provider Thursday 02/07/219 and wants to confirm  her medical record reflects she still does take \"Nitrofurantoin\" 50 MG once a day and she does not need \"Amitiza\". Best contact  [phone].

## 2019-02-13 RX ORDER — NITROFURANTOIN MACROCRYSTALS 50 MG/1
CAPSULE ORAL 4 TIMES DAILY
COMMUNITY
End: 2019-05-09 | Stop reason: SDUPTHER

## 2019-02-21 DIAGNOSIS — N30.20 CHRONIC CYSTITIS: ICD-10-CM

## 2019-02-21 RX ORDER — NITROFURANTOIN MACROCRYSTALS 50 MG/1
CAPSULE ORAL
Qty: 90 CAP | Refills: 1 | Status: SHIPPED | OUTPATIENT
Start: 2019-02-21 | End: 2019-09-09 | Stop reason: SDUPTHER

## 2019-03-21 DIAGNOSIS — I10 ESSENTIAL HYPERTENSION: ICD-10-CM

## 2019-03-21 DIAGNOSIS — I48.20 CHRONIC ATRIAL FIBRILLATION (HCC): ICD-10-CM

## 2019-03-21 DIAGNOSIS — Z79.01 CHRONIC ANTICOAGULATION: ICD-10-CM

## 2019-03-25 ENCOUNTER — HOSPITAL ENCOUNTER (OUTPATIENT)
Dept: LAB | Age: 84
Discharge: HOME | End: 2019-03-25
Payer: MEDICARE

## 2019-03-25 PROCEDURE — 80048 BASIC METABOLIC PNL TOTAL CA: CPT

## 2019-03-25 PROCEDURE — 36415 COLL VENOUS BLD VENIPUNCTURE: CPT

## 2019-03-25 PROCEDURE — 85025 COMPLETE CBC W/AUTO DIFF WBC: CPT

## 2019-03-25 PROCEDURE — 85610 PROTHROMBIN TIME: CPT

## 2019-03-25 RX ORDER — BISOPROLOL FUMARATE AND HYDROCHLOROTHIAZIDE 10; 6.25 MG/1; MG/1
TABLET ORAL
Qty: 90 TAB | Refills: 1 | Status: SHIPPED | OUTPATIENT
Start: 2019-03-25 | End: 2019-09-24 | Stop reason: SDUPTHER

## 2019-03-26 LAB
BASOPHILS # BLD AUTO: 0 X10E3/UL (ref 0–0.2)
BASOPHILS NFR BLD AUTO: 1 %
BUN SERPL-MCNC: 15 MG/DL (ref 10–36)
BUN/CREAT SERPL: 25 (ref 12–28)
CALCIUM SERPL-MCNC: 9.4 MG/DL (ref 8.7–10.3)
CHLORIDE SERPL-SCNC: 99 MMOL/L (ref 96–106)
CO2 SERPL-SCNC: 26 MMOL/L (ref 20–29)
CREAT SERPL-MCNC: 0.6 MG/DL (ref 0.57–1)
EOSINOPHIL # BLD AUTO: 0.1 X10E3/UL (ref 0–0.4)
EOSINOPHIL NFR BLD AUTO: 1 %
ERYTHROCYTE [DISTWIDTH] IN BLOOD BY AUTOMATED COUNT: 13.6 % (ref 12.3–15.4)
GLUCOSE SERPL-MCNC: 97 MG/DL (ref 65–99)
HCT VFR BLD AUTO: 44.8 % (ref 34–46.6)
HGB BLD-MCNC: 15 G/DL (ref 11.1–15.9)
IMM GRANULOCYTES # BLD AUTO: 0 X10E3/UL (ref 0–0.1)
IMM GRANULOCYTES NFR BLD AUTO: 0 %
INR PPP: 2.2 (ref 0.8–1.2)
LYMPHOCYTES # BLD AUTO: 1.1 X10E3/UL (ref 0.7–3.1)
LYMPHOCYTES NFR BLD AUTO: 20 %
MCH RBC QN AUTO: 32.5 PG (ref 26.6–33)
MCHC RBC AUTO-ENTMCNC: 33.5 G/DL (ref 31.5–35.7)
MCV RBC AUTO: 97 FL (ref 79–97)
MONOCYTES # BLD AUTO: 0.4 X10E3/UL (ref 0.1–0.9)
MONOCYTES NFR BLD AUTO: 8 %
NEUTROPHILS # BLD AUTO: 3.9 X10E3/UL (ref 1.4–7)
NEUTROPHILS NFR BLD AUTO: 70 %
PLATELET # BLD AUTO: 159 X10E3/UL (ref 150–379)
POTASSIUM SERPL-SCNC: 4.2 MMOL/L (ref 3.5–5.2)
PROTHROMBIN TIME: 21.6 SEC (ref 9.1–12)
RBC # BLD AUTO: 4.61 X10E6/UL (ref 3.77–5.28)
SODIUM SERPL-SCNC: 143 MMOL/L (ref 134–144)
WBC # BLD AUTO: 5.5 X10E3/UL (ref 3.4–10.8)

## 2019-05-09 ENCOUNTER — OFFICE VISIT (OUTPATIENT)
Dept: INTERNAL MEDICINE CLINIC | Age: 84
End: 2019-05-09

## 2019-05-09 VITALS
SYSTOLIC BLOOD PRESSURE: 124 MMHG | TEMPERATURE: 97.7 F | WEIGHT: 125 LBS | HEART RATE: 71 BPM | RESPIRATION RATE: 16 BRPM | OXYGEN SATURATION: 98 % | DIASTOLIC BLOOD PRESSURE: 74 MMHG | HEIGHT: 66 IN | BODY MASS INDEX: 20.09 KG/M2

## 2019-05-09 DIAGNOSIS — G89.29 CHRONIC PAIN OF BOTH SHOULDERS: ICD-10-CM

## 2019-05-09 DIAGNOSIS — M25.511 CHRONIC PAIN OF BOTH SHOULDERS: ICD-10-CM

## 2019-05-09 DIAGNOSIS — I10 ESSENTIAL HYPERTENSION: ICD-10-CM

## 2019-05-09 DIAGNOSIS — M25.512 CHRONIC PAIN OF BOTH SHOULDERS: ICD-10-CM

## 2019-05-09 DIAGNOSIS — M25.562 CHRONIC PAIN OF BOTH KNEES: ICD-10-CM

## 2019-05-09 DIAGNOSIS — I48.20 CHRONIC ATRIAL FIBRILLATION (HCC): ICD-10-CM

## 2019-05-09 DIAGNOSIS — R63.4 WEIGHT LOSS: ICD-10-CM

## 2019-05-09 DIAGNOSIS — E55.9 VITAMIN D DEFICIENCY: ICD-10-CM

## 2019-05-09 DIAGNOSIS — Z79.01 CHRONIC ANTICOAGULATION: Primary | ICD-10-CM

## 2019-05-09 DIAGNOSIS — M25.561 CHRONIC PAIN OF BOTH KNEES: ICD-10-CM

## 2019-05-09 DIAGNOSIS — Z79.01 WARFARIN ANTICOAGULATION: ICD-10-CM

## 2019-05-09 DIAGNOSIS — G89.29 CHRONIC PAIN OF BOTH KNEES: ICD-10-CM

## 2019-05-09 LAB
INR BLD: 2.1
PT POC: 25.2 SECONDS
VALID INTERNAL CONTROL?: YES

## 2019-05-09 RX ORDER — DICLOFENAC SODIUM 10 MG/G
2 GEL TOPICAL 4 TIMES DAILY
Qty: 100 G | Refills: 3 | Status: ON HOLD | OUTPATIENT
Start: 2019-05-09 | End: 2020-02-14

## 2019-05-09 RX ORDER — FEXOFENADINE HCL 60 MG
TABLET ORAL
COMMUNITY
End: 2019-11-21

## 2019-05-09 NOTE — PROGRESS NOTES
Sameer Castellanos is a 80 y.o. female who presents today for Hypertension and Irregular Heart Beat Asad Collins She has a history of  
Patient Active Problem List  
Diagnosis Code  
 HTN (hypertension) I10  
 IBS (irritable bowel syndrome) K58.9  Cataract H26.9  Diverticulosis K57.90  Atrial fibrillation (HCC) I48.91  
 Chronic cystitis N30.20  Chronic anticoagulation Z79.01  
 Advanced care planning/counseling discussion Z71.89 Asad Collins Today patient is here for follow-up. Anticoagulation INR of 2.1 today. Continue current dose of Coumadin Patient here for followup of chronic anticoagulation. Indication: Atrial Fibrillation. Bleeding Signs/Symptoms:  None. Compliance with warfarin:  Yes INR's are drawn regularly and have been Therapeutic. Lab Results Component Value Date/Time INR 2.2 (H) 03/25/2019 02:27 PM  
 INR 2.4 (H) 12/12/2018 12:52 PM  
 INR 1.9 (H) 07/11/2018 12:44 PM  
 INR POC 2.4 02/07/2019 03:08 PM  
 INR POC 2.7 11/08/2018 03:10 PM  
 INR POC 2.3 08/09/2018 02:41 PM  
 Prothrombin time 21.6 (H) 03/25/2019 02:27 PM  
 Prothrombin time 23.3 (H) 12/12/2018 12:52 PM  
 Prothrombin time 19.2 (H) 07/11/2018 12:44 PM  
 
Hypertension - stable. Hypertension ROS: taking medications as instructed, no medication side effects noted, no TIA's, no chest pain on exertion, no dyspnea on exertion, no swelling of ankles     reports that she has quit smoking. She has never used smokeless tobacco.    reports that she drinks about 0.6 oz of alcohol per week. BP Readings from Last 2 Encounters:  
05/09/19 124/74  
02/07/19 136/82 MS pain: Still taking PRN Celebrex but she is not taking this daily. I discussed my concerns over her being on warfarin and being on an NSAID. We could try topical NSAIDs and I have provided her with a good Rx coupon I also encouraged her to increase her acetaminophen dose to a max of 3000 mill grams per 24 hours. We discussed seeing an orthopedist to consider injections. Told taking Robaxin for her back ROS Review of Systems Constitutional: Negative for chills, fever and weight loss. HENT: Negative for congestion and sore throat. Bilateral ear fullness Eyes: Negative for blurred vision, double vision and photophobia. Respiratory: Negative for cough and shortness of breath. Cardiovascular: Negative for chest pain, palpitations and leg swelling. Gastrointestinal: Negative for abdominal pain, constipation, diarrhea, heartburn, nausea and vomiting. Genitourinary: Negative for dysuria, frequency and urgency. Musculoskeletal: Positive for back pain, joint pain, myalgias and neck pain. Skin: Negative for rash. Neurological: Negative. Negative for headaches. Endo/Heme/Allergies: Does not bruise/bleed easily. Psychiatric/Behavioral: Negative for memory loss and suicidal ideas. Visit Vitals /74 (BP 1 Location: Left arm, BP Patient Position: Sitting) Pulse 71 Temp 97.7 °F (36.5 °C) (Oral) Resp 16 Ht 5' 6\" (1.676 m) Wt 125 lb (56.7 kg) SpO2 98% BMI 20.18 kg/m² Physical Exam  
Constitutional: She is oriented to person, place, and time. She appears well-developed and well-nourished. No distress. HENT:  
Head: Normocephalic and atraumatic. We would behind both tympanic membrane. No cerumen to ear canal  
Neck: Normal range of motion. Neck supple. No thyromegaly present. Cardiovascular: Normal rate and regular rhythm. No murmur heard. Pulmonary/Chest: Effort normal and breath sounds normal. She has no wheezes. Abdominal: Soft. Bowel sounds are normal. She exhibits no distension. Musculoskeletal: She exhibits no edema. Neurological: She is alert and oriented to person, place, and time. No cranial nerve deficit. Skin: Skin is warm and dry. Psychiatric: She has a normal mood and affect. Her behavior is normal.  
 
 
 
Current Outpatient Medications Medication Sig  
 fluticasone propionate (FLONASE) 50 mcg/actuation nasal spray USE TWO SPRAYS IN THE AFFECTED NOSTRIL ONE TIME DAILY  bisoprolol-hydroCHLOROthiazide (ZIAC) 10-6.25 mg per tablet TAKE ONE TABLET BY MOUTH ONE TIME DAILY  nitrofurantoin (MACRODANTIN) 50 mg capsule TAKE ONE CAPSULE BY MOUTH ONE TIME DAILY  polyethylene glycol (MIRALAX) 17 gram packet MIX 1 PACKET IN LIQUID AND DRINK BY MOUTH DAILY  methocarbamol (ROBAXIN) 500 mg tablet Take 1 Tab by mouth four (4) times daily as needed.  warfarin (JANTOVEN) 5 mg tablet TAKE ONE TABLET BY MOUTH ONE TIME DAILY  polyethylene glycol (MIRALAX) 17 gram packet MIX 1 PACKET IN LIQUID AND DRINK BY MOUTH DAILY  celecoxib (CELEBREX) 200 mg capsule TAKE ONE CAPSULE BY MOUTH ONE TIME DAILY AS NEEDED  
 amLODIPine (NORVASC) 5 mg tablet TAKE ONE TABLET BY MOUTH ONE TIME DAILY  lisinopril (PRINIVIL, ZESTRIL) 10 mg tablet TAKE ONE TABLET BY MOUTH ONE TIME DAILY  VOLTAREN 1 % gel APPLY 2 GRAMS TO THE AFFECTED AREA FOUR TIMES A DAY AS NEEDED  
 AMITIZA 8 mcg capsule TAKE ONE CAPSULE BY MOUTH TWICE A DAY WITH MEALS (Patient taking differently: One tab every other day) No current facility-administered medications for this visit. Past Medical History:  
Diagnosis Date  Diverticula, colon  Hypertension  IBS (irritable bowel syndrome)  Irregular heart beat Past Surgical History:  
Procedure Laterality Date  HX  SECTION    
 X 3  
 HX COLONOSCOPY  8/28/15  
 with endoscopy day before  HX HYSTERECTOMY Social History Tobacco Use  Smoking status: Former Smoker  Smokeless tobacco: Never Used Substance Use Topics  Alcohol use: Yes Alcohol/week: 0.6 oz Types: 1 Glasses of wine per week Family History Problem Relation Age of Onset  Tuberculosis Mother Allergies Allergen Reactions  Pcn [Penicillins] Hives Assessment/Plan Diagnoses and all orders for this visit: 
 
1. Chronic anticoagulation-discussed my concern of her chronic NSAID use along with warfarin. Patient will try to limit Celebrex use and will use more acetaminophen. Continue topical therapies 
-     PROTHROMBIN TIME + INR; Future -     METABOLIC PANEL, COMPREHENSIVE; Future 2. Warfarin anticoagulation -     AMB POC PT/INR 
-     PROTHROMBIN TIME + INR; Future -     METABOLIC PANEL, COMPREHENSIVE; Future 3. Essential hypertension -well controlled. -     VITAMIN D, 25 HYDROXY; Future -     CBC WITH AUTOMATED DIFF; Future 4. Chronic atrial fibrillation (HCC) 5. Weight loss -weight is been stable 6. Chronic pain of both knees -discussed seeing orthopedist if this continues to worsen. -     diclofenac (VOLTAREN) 1 % gel; Apply 2 g to affected area four (4) times daily. 7. Chronic pain of both shoulders 
-     diclofenac (VOLTAREN) 1 % gel; Apply 2 g to affected area four (4) times daily. 8. Vitamin D deficiency 
-     VITAMIN D, 25 HYDROXY; Future Brenden Sierra MD 
5/9/2019 This note was created with the help of speech recognition software (Dragon) and may contain some 'sound alike' errors.

## 2019-05-14 RX ORDER — LISINOPRIL 10 MG/1
TABLET ORAL
Qty: 90 TAB | Refills: 1 | Status: SHIPPED | OUTPATIENT
Start: 2019-05-14 | End: 2019-11-09 | Stop reason: SDUPTHER

## 2019-06-05 DIAGNOSIS — I10 ESSENTIAL HYPERTENSION: ICD-10-CM

## 2019-06-06 RX ORDER — AMLODIPINE BESYLATE 5 MG/1
TABLET ORAL
Qty: 90 TAB | Refills: 1 | Status: SHIPPED | OUTPATIENT
Start: 2019-06-06 | End: 2019-09-24 | Stop reason: SDUPTHER

## 2019-06-23 DIAGNOSIS — I10 ESSENTIAL HYPERTENSION: ICD-10-CM

## 2019-06-23 DIAGNOSIS — Z79.01 WARFARIN ANTICOAGULATION: ICD-10-CM

## 2019-06-23 DIAGNOSIS — Z79.01 CHRONIC ANTICOAGULATION: ICD-10-CM

## 2019-06-23 DIAGNOSIS — E55.9 VITAMIN D DEFICIENCY: ICD-10-CM

## 2019-06-28 ENCOUNTER — HOSPITAL ENCOUNTER (OUTPATIENT)
Dept: LAB | Age: 84
Discharge: HOME | End: 2019-06-28
Payer: MEDICARE

## 2019-06-28 PROCEDURE — 80053 COMPREHEN METABOLIC PANEL: CPT

## 2019-06-28 PROCEDURE — 36415 COLL VENOUS BLD VENIPUNCTURE: CPT

## 2019-06-28 PROCEDURE — 82306 VITAMIN D 25 HYDROXY: CPT

## 2019-06-28 PROCEDURE — 85025 COMPLETE CBC W/AUTO DIFF WBC: CPT

## 2019-06-28 PROCEDURE — 85610 PROTHROMBIN TIME: CPT

## 2019-06-29 LAB
25(OH)D3+25(OH)D2 SERPL-MCNC: 45.1 NG/ML (ref 30–100)
ALBUMIN SERPL-MCNC: 4.6 G/DL (ref 3.2–4.6)
ALBUMIN/GLOB SERPL: 2.3 {RATIO} (ref 1.2–2.2)
ALP SERPL-CCNC: 96 IU/L (ref 39–117)
ALT SERPL-CCNC: 18 IU/L (ref 0–32)
AST SERPL-CCNC: 23 IU/L (ref 0–40)
BASOPHILS # BLD AUTO: 0 X10E3/UL (ref 0–0.2)
BASOPHILS NFR BLD AUTO: 1 %
BILIRUB SERPL-MCNC: 0.9 MG/DL (ref 0–1.2)
BUN SERPL-MCNC: 14 MG/DL (ref 10–36)
BUN/CREAT SERPL: 22 (ref 12–28)
CALCIUM SERPL-MCNC: 9.2 MG/DL (ref 8.7–10.3)
CHLORIDE SERPL-SCNC: 102 MMOL/L (ref 96–106)
CO2 SERPL-SCNC: 27 MMOL/L (ref 20–29)
CREAT SERPL-MCNC: 0.63 MG/DL (ref 0.57–1)
EOSINOPHIL # BLD AUTO: 0.1 X10E3/UL (ref 0–0.4)
EOSINOPHIL NFR BLD AUTO: 2 %
ERYTHROCYTE [DISTWIDTH] IN BLOOD BY AUTOMATED COUNT: 14.2 % (ref 12.3–15.4)
GLOBULIN SER CALC-MCNC: 2 G/DL (ref 1.5–4.5)
GLUCOSE SERPL-MCNC: 101 MG/DL (ref 65–99)
HCT VFR BLD AUTO: 46.1 % (ref 34–46.6)
HGB BLD-MCNC: 15 G/DL (ref 11.1–15.9)
IMM GRANULOCYTES # BLD AUTO: 0 X10E3/UL (ref 0–0.1)
IMM GRANULOCYTES NFR BLD AUTO: 0 %
INR PPP: 2.6 (ref 0.8–1.2)
LYMPHOCYTES # BLD AUTO: 1.1 X10E3/UL (ref 0.7–3.1)
LYMPHOCYTES NFR BLD AUTO: 24 %
MCH RBC QN AUTO: 32.6 PG (ref 26.6–33)
MCHC RBC AUTO-ENTMCNC: 32.5 G/DL (ref 31.5–35.7)
MCV RBC AUTO: 100 FL (ref 79–97)
MONOCYTES # BLD AUTO: 0.5 X10E3/UL (ref 0.1–0.9)
MONOCYTES NFR BLD AUTO: 10 %
NEUTROPHILS # BLD AUTO: 3 X10E3/UL (ref 1.4–7)
NEUTROPHILS NFR BLD AUTO: 63 %
PLATELET # BLD AUTO: 173 X10E3/UL (ref 150–450)
POTASSIUM SERPL-SCNC: 4.3 MMOL/L (ref 3.5–5.2)
PROT SERPL-MCNC: 6.6 G/DL (ref 6–8.5)
PROTHROMBIN TIME: 25.9 SEC (ref 9.1–12)
RBC # BLD AUTO: 4.6 X10E6/UL (ref 3.77–5.28)
SODIUM SERPL-SCNC: 142 MMOL/L (ref 134–144)
WBC # BLD AUTO: 4.6 X10E3/UL (ref 3.4–10.8)

## 2019-07-05 DIAGNOSIS — R52 PAIN: ICD-10-CM

## 2019-07-05 RX ORDER — CELECOXIB 200 MG/1
CAPSULE ORAL
Qty: 30 CAP | Refills: 2 | Status: SHIPPED | OUTPATIENT
Start: 2019-07-05 | End: 2019-12-18

## 2019-07-31 DIAGNOSIS — Z51.81 ANTICOAGULATION GOAL OF INR 2 TO 3: ICD-10-CM

## 2019-07-31 DIAGNOSIS — D68.9 COAGULATION DISORDER (HCC): ICD-10-CM

## 2019-07-31 DIAGNOSIS — Z79.01 ANTICOAGULATION GOAL OF INR 2 TO 3: ICD-10-CM

## 2019-07-31 RX ORDER — WARFARIN SODIUM 5 MG/1
TABLET ORAL
Qty: 90 TAB | Refills: 1 | Status: SHIPPED | OUTPATIENT
Start: 2019-07-31 | End: 2020-02-03

## 2019-08-19 ENCOUNTER — OFFICE VISIT (OUTPATIENT)
Dept: INTERNAL MEDICINE CLINIC | Age: 84
End: 2019-08-19

## 2019-08-19 VITALS
TEMPERATURE: 97.8 F | HEIGHT: 66 IN | DIASTOLIC BLOOD PRESSURE: 68 MMHG | BODY MASS INDEX: 19.93 KG/M2 | HEART RATE: 71 BPM | RESPIRATION RATE: 16 BRPM | OXYGEN SATURATION: 98 % | SYSTOLIC BLOOD PRESSURE: 112 MMHG | WEIGHT: 124 LBS

## 2019-08-19 DIAGNOSIS — M25.562 CHRONIC PAIN OF BOTH KNEES: ICD-10-CM

## 2019-08-19 DIAGNOSIS — M25.561 CHRONIC PAIN OF BOTH KNEES: ICD-10-CM

## 2019-08-19 DIAGNOSIS — K58.1 IRRITABLE BOWEL SYNDROME WITH CONSTIPATION: ICD-10-CM

## 2019-08-19 DIAGNOSIS — I10 ESSENTIAL HYPERTENSION: ICD-10-CM

## 2019-08-19 DIAGNOSIS — Z13.31 SCREENING FOR DEPRESSION: ICD-10-CM

## 2019-08-19 DIAGNOSIS — R26.89 BALANCE PROBLEMS: ICD-10-CM

## 2019-08-19 DIAGNOSIS — G89.29 CHRONIC PAIN OF BOTH KNEES: ICD-10-CM

## 2019-08-19 DIAGNOSIS — Z00.00 MEDICARE ANNUAL WELLNESS VISIT, SUBSEQUENT: Primary | ICD-10-CM

## 2019-08-19 DIAGNOSIS — M62.830 BACK MUSCLE SPASM: ICD-10-CM

## 2019-08-19 DIAGNOSIS — I48.20 CHRONIC ATRIAL FIBRILLATION (HCC): ICD-10-CM

## 2019-08-19 DIAGNOSIS — Z71.89 ADVANCED DIRECTIVES, COUNSELING/DISCUSSION: ICD-10-CM

## 2019-08-19 DIAGNOSIS — Z79.01 CHRONIC ANTICOAGULATION: ICD-10-CM

## 2019-08-19 LAB
INR BLD: 2.2
PT POC: 26.5 SECONDS
VALID INTERNAL CONTROL?: YES

## 2019-08-19 RX ORDER — METHOCARBAMOL 500 MG/1
500 TABLET, FILM COATED ORAL
Qty: 120 TAB | Refills: 2 | Status: SHIPPED | OUTPATIENT
Start: 2019-08-19 | End: 2019-12-11

## 2019-08-19 NOTE — PROGRESS NOTES
Jessy Mederos is a 80 y.o. female who presents today for Annual Wellness Visit  . She has a history of   Patient Active Problem List   Diagnosis Code    HTN (hypertension) I10    IBS (irritable bowel syndrome) K58.9    Cataract H26.9    Diverticulosis K57.90    Atrial fibrillation (HCC) I48.91    Chronic cystitis N30.20    Chronic anticoagulation Z79.01    Advanced care planning/counseling discussion Z71.89   . Today patient is here for Medicare wellness exam..     IBS: having some recent problems of this. No fevers. Some chills. No blood in her stool. No cramping. Overall her neck pain is stable. She does still take a muscle relaxer. She does still take Celebrex. We have reviewed thoroughly with her risk and benefits and potential contraindications with her Coumadin. She does note occasional vertigo but symptoms are stable. She still physically active. We discussed physical therapy if she feels like her balance is getting off. Hypertension - stable. Hypertension ROS: taking medications as instructed, no medication side effects noted, no TIA's, no chest pain on exertion, no dyspnea on exertion, no swelling of ankles     reports that she has quit smoking. She has never used smokeless tobacco.    reports that she drinks about 1.0 standard drinks of alcohol per week. BP Readings from Last 2 Encounters:   08/19/19 112/68   05/09/19 124/74     Anticoagulation  Patient here for followup of chronic anticoagulation. Indication: Atrial Fibrillation. Therapeutic.   Lab Results   Component Value Date/Time    INR 2.6 (H) 06/28/2019 02:24 PM    INR 2.2 (H) 03/25/2019 02:27 PM    INR 2.4 (H) 12/12/2018 12:52 PM    INR POC 2.2 08/19/2019 03:38 PM    INR POC 2.1 05/09/2019 03:08 PM    INR POC 2.4 02/07/2019 03:08 PM    Prothrombin time 25.9 (H) 06/28/2019 02:24 PM    Prothrombin time 21.6 (H) 03/25/2019 02:27 PM    Prothrombin time 23.3 (H) 12/12/2018 12:52 PM       Health maintenance hx includes:  Exercise: moderately active. Form of exercise: going to the store. Diet: generally follows a low fat low cholesterol diet, nonsmoker, alcohol intake none  Social: Lives with daughter.      Screening:               Colon cancer screening:  N/A              Breast cancer screening: N/A              Cervical cancer screening: last PAP/Pelvic exam: N/A              Osteoporosis screening:  Last BMD:  Will order DEXA.         Immunizations:                     Immunization History   Administered Date(s) Administered    Influenza High Dose Vaccine PF 11/01/2016, 10/12/2017    Influenza Vaccine (Quad) PF 10/05/2015    Pneumococcal Conjugate (PCV-13) 09/01/2015    Pneumococcal Polysaccharide (PPSV-23) 10/01/2002    Tdap 09/01/2015    Zoster Vaccine, Live 06/01/2009                  Immunization status: up to date and documented.           ROS  Review of Systems   Constitutional: Negative for chills, fever and weight loss. HENT: Negative for congestion and sore throat. Eyes: Negative for blurred vision, double vision and photophobia. Respiratory: Negative for cough and shortness of breath. Cardiovascular: Negative for chest pain, palpitations and leg swelling. Gastrointestinal: Positive for constipation. Negative for abdominal pain, diarrhea, heartburn, nausea and vomiting. Genitourinary: Negative for dysuria, frequency and urgency. Musculoskeletal: Positive for joint pain (Knees chronic issue) and neck pain. Negative for myalgias. Skin: Negative for rash. Neurological: Negative. Negative for headaches. Endo/Heme/Allergies: Does not bruise/bleed easily. Psychiatric/Behavioral: Negative for memory loss and suicidal ideas.        Visit Vitals  /68 (BP 1 Location: Left arm, BP Patient Position: Sitting)   Pulse 71   Temp 97.8 °F (36.6 °C) (Oral)   Resp 16   Ht 5' 6\" (1.676 m)   Wt 124 lb (56.2 kg)   SpO2 98%   BMI 20.01 kg/m²       Physical Exam   Constitutional: She is oriented to person, place, and time. She appears well-developed and well-nourished. No distress. HENT:   Head: Normocephalic and atraumatic. Neck: Normal range of motion. Neck supple. No thyromegaly present. Cardiovascular: Normal rate. No murmur heard. irregular   Pulmonary/Chest: Effort normal and breath sounds normal. She has no wheezes. Abdominal: Soft. Bowel sounds are normal. She exhibits no distension. No peritoneal signs no guarding   Musculoskeletal: She exhibits no edema. Neurological: She is alert and oriented to person, place, and time. No cranial nerve deficit. Skin: Skin is warm and dry. Psychiatric: She has a normal mood and affect. Her behavior is normal.         Current Outpatient Medications   Medication Sig    methocarbamol (ROBAXIN) 500 mg tablet Take 1 Tab by mouth four (4) times daily as needed for Pain.  warfarin (JANTOVEN) 5 mg tablet TAKE ONE TABLET BY MOUTH ONE TIME DAILY    celecoxib (CELEBREX) 200 mg capsule TAKE ONE CAPSULE BY MOUTH ONE TIME DAILY AS NEEDED    amLODIPine (NORVASC) 5 mg tablet TAKE ONE TABLET BY MOUTH ONE TIME DAILY    lisinopril (PRINIVIL, ZESTRIL) 10 mg tablet TAKE ONE TABLET BY MOUTH ONE TIME DAILY    diclofenac (VOLTAREN) 1 % gel Apply 2 g to affected area four (4) times daily.  fluticasone propionate (FLONASE) 50 mcg/actuation nasal spray USE TWO SPRAYS IN THE AFFECTED NOSTRIL ONE TIME DAILY    bisoprolol-hydroCHLOROthiazide (ZIAC) 10-6.25 mg per tablet TAKE ONE TABLET BY MOUTH ONE TIME DAILY    nitrofurantoin (MACRODANTIN) 50 mg capsule TAKE ONE CAPSULE BY MOUTH ONE TIME DAILY    polyethylene glycol (MIRALAX) 17 gram packet MIX 1 PACKET IN LIQUID AND DRINK BY MOUTH DAILY    fexofenadine (ALLEGRA) 60 mg tablet Take  by mouth. No current facility-administered medications for this visit.          Past Medical History:   Diagnosis Date    Diverticula, colon     Hypertension     IBS (irritable bowel syndrome)     Irregular heart beat Past Surgical History:   Procedure Laterality Date    HX  SECTION      X 3    HX COLONOSCOPY  8/28/15    with endoscopy day before    HX HYSTERECTOMY        Social History     Tobacco Use    Smoking status: Former Smoker    Smokeless tobacco: Never Used   Substance Use Topics    Alcohol use: Yes     Alcohol/week: 1.0 standard drinks     Types: 1 Glasses of wine per week      Family History   Problem Relation Age of Onset    Tuberculosis Mother         Allergies   Allergen Reactions    Pcn [Penicillins] Hives        Assessment/Plan  Diagnoses and all orders for this visit:    1. Medicare annual wellness visit, subsequent -health maintenance all up-to-date. We discussed shingles vaccine. Fanny Locke was counseled on age-appropriate/ guideline-based risk prevention behaviors and screening for a 80y.o. year old   female . We also discussed adjustments in screening based on family history if necessary. Printed instructions for preventative screening guidelines and healthy behaviors given to patient with after visit summary. 2. Chronic atrial fibrillation (HCC) -INR at goal.  -     AMB POC PT/INR    3. Essential hypertension -pressure well controlled  -     PROTHROMBIN TIME + INR; Future  -     METABOLIC PANEL, BASIC; Future  -     CBC WITH AUTOMATED DIFF; Future    4. Advanced directives, counseling/discussion    5. Screening for depression  -     DEPRESSION SCREEN ANNUAL    6. Chronic anticoagulation -stable INR  -     AMB POC PT/INR  -     PROTHROMBIN TIME + INR; Future  -     METABOLIC PANEL, BASIC; Future  -     CBC WITH AUTOMATED DIFF; Future    7. Irritable bowel syndrome with constipation -patient will constipated recently. We discussed a bland diet and increasing water intake. Denies any significant cramping with this. 8. Back muscle spasm -PRN use.  -     methocarbamol (ROBAXIN) 500 mg tablet; Take 1 Tab by mouth four (4) times daily as needed for Pain.     9. Balance problems -notes occasional balance issues. Overall this is stable. We discussed sending to PT if her balance issues continue. 10. Chronic pain of both knees -bilateral knees. She notes that this is much worse when she stops her Celebrex. We discussed that if this continues get worse she can see the orthopedist for consideration for injection. Follow-up and Dispositions    · Return in about 3 months (around 2019). Dian Esposito MD  2019    This note was created with the help of speech recognition software [employer]) and may contain some 'sound alike' errors. This is the Subsequent Medicare Annual Wellness Exam, performed 12 months or more after the Initial AWV or the last Subsequent AWV    I have reviewed the patient's medical history in detail and updated the computerized patient record. History     Past Medical History:   Diagnosis Date    Diverticula, colon     Hypertension     IBS (irritable bowel syndrome)     Irregular heart beat       Past Surgical History:   Procedure Laterality Date    HX  SECTION      X 3    HX COLONOSCOPY  8/28/15    with endoscopy day before    HX HYSTERECTOMY       Current Outpatient Medications   Medication Sig Dispense Refill    methocarbamol (ROBAXIN) 500 mg tablet Take 1 Tab by mouth four (4) times daily as needed for Pain. 120 Tab 2    warfarin (JANTOVEN) 5 mg tablet TAKE ONE TABLET BY MOUTH ONE TIME DAILY 90 Tab 1    celecoxib (CELEBREX) 200 mg capsule TAKE ONE CAPSULE BY MOUTH ONE TIME DAILY AS NEEDED 30 Cap 2    amLODIPine (NORVASC) 5 mg tablet TAKE ONE TABLET BY MOUTH ONE TIME DAILY 90 Tab 1    lisinopril (PRINIVIL, ZESTRIL) 10 mg tablet TAKE ONE TABLET BY MOUTH ONE TIME DAILY 90 Tab 1    diclofenac (VOLTAREN) 1 % gel Apply 2 g to affected area four (4) times daily.  100 g 3    fluticasone propionate (FLONASE) 50 mcg/actuation nasal spray USE TWO SPRAYS IN THE AFFECTED NOSTRIL ONE TIME DAILY 16 g 6    bisoprolol-hydroCHLOROthiazide (ZIAC) 10-6.25 mg per tablet TAKE ONE TABLET BY MOUTH ONE TIME DAILY 90 Tab 1    nitrofurantoin (MACRODANTIN) 50 mg capsule TAKE ONE CAPSULE BY MOUTH ONE TIME DAILY 90 Cap 1    polyethylene glycol (MIRALAX) 17 gram packet MIX 1 PACKET IN LIQUID AND DRINK BY MOUTH DAILY 100 Packet 1    fexofenadine (ALLEGRA) 60 mg tablet Take  by mouth. Allergies   Allergen Reactions    Pcn [Penicillins] Hives     Family History   Problem Relation Age of Onset    Tuberculosis Mother      Social History     Tobacco Use    Smoking status: Former Smoker    Smokeless tobacco: Never Used   Substance Use Topics    Alcohol use:  Yes     Alcohol/week: 1.0 standard drinks     Types: 1 Glasses of wine per week     Patient Active Problem List   Diagnosis Code    HTN (hypertension) I10    IBS (irritable bowel syndrome) K58.9    Cataract H26.9    Diverticulosis K57.90    Atrial fibrillation (HCC) I48.91    Chronic cystitis N30.20    Chronic anticoagulation Z79.01    Advanced care planning/counseling discussion Z71.89       Depression Risk Factor Screening:     3 most recent PHQ Screens 8/19/2019   Little interest or pleasure in doing things Not at all   Feeling down, depressed, irritable, or hopeless Not at all   Total Score PHQ 2 0   Trouble falling or staying asleep, or sleeping too much Not at all   Feeling tired or having little energy Not at all   Poor appetite, weight loss, or overeating Not at all   Feeling bad about yourself - or that you are a failure or have let yourself or your family down Not at all   Trouble concentrating on things such as school, work, reading, or watching TV Not at all   Moving or speaking so slowly that other people could have noticed; or the opposite being so fidgety that others notice Not at all   Thoughts of being better off dead, or hurting yourself in some way Not at all   PHQ 9 Score 0   How difficult have these problems made it for you to do your work, take care of your home and get along with others Not difficult at all     Alcohol Risk Factor Screening: You do not drink alcohol or very rarely. Functional Ability and Level of Safety:   Hearing Loss  Hearing is good. Activities of Daily Living  The home contains: no safety equipment. Patient does total self care    Fall Risk  Fall Risk Assessment, last 12 mths 8/19/2019   Able to walk? Yes   Fall in past 12 months? No   Fall with injury? -   Number of falls in past 12 months -   Fall Risk Score -       Abuse Screen  Patient is not abused    Cognitive Screening   Evaluation of Cognitive Function:  Has your family/caregiver stated any concerns about your memory: no  Normal    Patient Care Team   Patient Care Team:  Sidra Sibley MD as PCP - General (Internal Medicine)    Assessment/Plan   Education and counseling provided:  Are appropriate based on today's review and evaluation  End-of-Life planning (with patient's consent)  Pneumococcal Vaccine    Diagnoses and all orders for this visit:    1. Medicare annual wellness visit, subsequent    2. Chronic atrial fibrillation (HCC)  -     AMB POC PT/INR    3. Essential hypertension  -     PROTHROMBIN TIME + INR; Future  -     METABOLIC PANEL, BASIC; Future  -     CBC WITH AUTOMATED DIFF; Future    4. Advanced directives, counseling/discussion    5. Screening for depression  -     DEPRESSION SCREEN ANNUAL    6. Chronic anticoagulation  -     AMB POC PT/INR  -     PROTHROMBIN TIME + INR; Future  -     METABOLIC PANEL, BASIC; Future  -     CBC WITH AUTOMATED DIFF; Future    7. Irritable bowel syndrome with constipation    8. Back muscle spasm  -     methocarbamol (ROBAXIN) 500 mg tablet; Take 1 Tab by mouth four (4) times daily as needed for Pain. 9. Balance problems    10.  Chronic pain of both knees        Health Maintenance Due   Topic Date Due    Shingrix Vaccine Age 49> (1 of 2) 01/22/1979    GLAUCOMA SCREENING Q2Y  04/08/2017    Influenza Age 5 to Adult  08/01/2019    MEDICARE YEARLY EXAM  08/10/2019

## 2019-08-19 NOTE — PATIENT INSTRUCTIONS
Medicare Wellness Visit, Female     The best way to live healthy is to have a lifestyle where you eat a well-balanced diet, exercise regularly, limit alcohol use, and quit all forms of tobacco/nicotine, if applicable. Regular preventive services are another way to keep healthy. Preventive services (vaccines, screening tests, monitoring & exams) can help personalize your care plan, which helps you manage your own care. Screening tests can find health problems at the earliest stages, when they are easiest to treat. [facility] follows the current, evidence-based guidelines published by the [facility] ([facility]) when recommending preventive services for our patients. Because we follow these guidelines, sometimes recommendations change over time as research supports it. (For example, mammograms used to be recommended annually. Even though Medicare will still pay for an annual mammogram, the newer guidelines recommend a mammogram every two years for women of average risk.)  Of course, you and your doctor may decide to screen more often for some diseases, based on your risk and your health status. Preventive services for you include:  - Medicare offers their members a free annual wellness visit, which is time for you and your primary care provider to discuss and plan for your preventive service needs. Take advantage of this benefit every year!  -All adults over the age of 72 should receive the recommended pneumonia vaccines. Current USPSTF guidelines recommend a series of two vaccines for the best pneumonia protection.   -All adults should have a flu vaccine yearly and a tetanus vaccine every 10 years. All adults age 61 and older should receive a shingles vaccine once in their lifetime.    -A bone mass density test is recommended when a woman turns 65 to screen for osteoporosis. This test is only recommended one time, as a screening.  Some providers will use this same test as a disease monitoring tool if you already have osteoporosis. -All adults age 38-68 who are overweight should have a diabetes screening test once every three years.   -Other screening tests and preventive services for persons with diabetes include: an eye exam to screen for diabetic retinopathy, a kidney function test, a foot exam, and stricter control over your cholesterol.   -Cardiovascular screening for adults with routine risk involves an electrocardiogram (ECG) at intervals determined by your doctor.   -Colorectal cancer screenings should be done for adults age 54-65 with no increased risk factors for colorectal cancer. There are a number of acceptable methods of screening for this type of cancer. Each test has its own benefits and drawbacks. Discuss with your doctor what is most appropriate for you during your annual wellness visit. The different tests include: colonoscopy (considered the best screening method), a fecal occult blood test, a fecal DNA test, and sigmoidoscopy. -Breast cancer screenings are recommended every other year for women of normal risk, age 54-69.  -Cervical cancer screenings for women over age 72 are only recommended with certain risk factors.   -All adults born between [facility] should be screened once for Hepatitis C.      Here is a list of your current Health Maintenance items (your personalized list of preventive services) with a due date:  Health Maintenance Due   Topic Date Due    Shingles Vaccine (1 of 2) 01/22/1979    Glaucoma Screening   04/08/2017    Flu Vaccine  08/01/2019    Annual Well Visit  08/10/2019

## 2019-08-19 NOTE — ACP (ADVANCE CARE PLANNING)
Advance Care Planning    Advance Care Planning (ACP) Provider Conversation Snapshot    Date of ACP Conversation: 08/19/19  Persons included in Conversation:  patient  Length of ACP Conversation in minutes:  <16 minutes (Non-Billable)    Authorized Decision Maker (if patient is incapable of making informed decisions):    This person is:   Healthcare Agent/Medical Power of  under Advance Directive            For Patients with Decision Making Capacity:   Values/Goals: Exploration of values, goals, and preferences if recovery is not expected, even with continued medical treatment in the event of:  Imminent death  Severe, permanent brain injury    Conversation Outcomes / Follow-Up Plan:   Recommended communicating the plan and making copies for the healthcare agent, personal physician, and others as appropriate (e.g., health system)

## 2019-08-26 ENCOUNTER — TELEPHONE (OUTPATIENT)
Dept: INTERNAL MEDICINE CLINIC | Age: 84
End: 2019-08-26

## 2019-08-26 DIAGNOSIS — N63.20 MASS OF LEFT BREAST: Primary | ICD-10-CM

## 2019-08-26 NOTE — TELEPHONE ENCOUNTER
----- Message from Rohith Prado sent at 8/26/2019  9:51 AM EDT -----  Regarding: Dr. Erna Malone: [phone]  Pt is requesting a referral for a diagnostic mammogram at [facility]. Phone number is [phone] and fax [phone].

## 2019-08-26 NOTE — TELEPHONE ENCOUNTER
Pt c/o lumps in L breast which she noticed recently. Orders for dx mammo and US of L breast have been faxed, Pt notified.

## 2019-08-30 ENCOUNTER — TELEPHONE (OUTPATIENT)
Dept: INTERNAL MEDICINE CLINIC | Age: 84
End: 2019-08-30

## 2019-08-30 DIAGNOSIS — N63.20 MASS OF LEFT BREAST: Primary | ICD-10-CM

## 2019-08-30 NOTE — TELEPHONE ENCOUNTER
Please correct both orders for patient.      mammogram order - bilateral needed due to not having mammogram in a while     Ultrasound - limited needed not complete for this type of mass     Fax to [address] [facility] [address]/ David Harmon Contact#    [phone]

## 2019-09-05 DIAGNOSIS — R92.8 ABNORMAL MAMMOGRAM OF LEFT BREAST: Primary | ICD-10-CM

## 2019-09-09 ENCOUNTER — TELEPHONE (OUTPATIENT)
Dept: INTERNAL MEDICINE CLINIC | Age: 84
End: 2019-09-09

## 2019-09-09 DIAGNOSIS — N63.20 MASS OF LEFT BREAST: ICD-10-CM

## 2019-09-09 DIAGNOSIS — F41.9 ANXIETY: Primary | ICD-10-CM

## 2019-09-09 DIAGNOSIS — N30.20 CHRONIC CYSTITIS: ICD-10-CM

## 2019-09-09 RX ORDER — ALPRAZOLAM 0.5 MG/1
TABLET ORAL
Qty: 2 TAB | Refills: 0 | OUTPATIENT
Start: 2019-09-09 | End: 2019-11-21

## 2019-09-09 RX ORDER — NITROFURANTOIN MACROCRYSTALS 50 MG/1
CAPSULE ORAL
Qty: 90 CAP | Refills: 1 | Status: ON HOLD | OUTPATIENT
Start: 2019-09-09 | End: 2020-02-14

## 2019-09-09 NOTE — TELEPHONE ENCOUNTER
Patient called requesting results from [address] [place] [address] Street. Please call patient to advise.  [phone]

## 2019-09-09 NOTE — TELEPHONE ENCOUNTER
Advised Pt MRI has been ordered. Pt reports feeling panicky during her last MRI and would like to have Rx sent to pharmacy to take prior to procedure.

## 2019-09-10 ENCOUNTER — DOCUMENTATION ONLY (OUTPATIENT)
Dept: INTERNAL MEDICINE CLINIC | Age: 84
End: 2019-09-10

## 2019-09-10 NOTE — PROGRESS NOTES
PA for nitrofurantoin has been approved. Pt notified. PA Case: [id number], Status: Approved, Coverage Starts on: 9/10/2019 12:00:00 AM, Coverage Ends on: 9/10/2020 12:00:00 AM. Questions?  Contact [phone]

## 2019-09-24 ENCOUNTER — TELEPHONE (OUTPATIENT)
Dept: INTERNAL MEDICINE CLINIC | Age: 84
End: 2019-09-24

## 2019-09-24 DIAGNOSIS — I10 ESSENTIAL HYPERTENSION: ICD-10-CM

## 2019-09-24 RX ORDER — BISOPROLOL FUMARATE AND HYDROCHLOROTHIAZIDE 10; 6.25 MG/1; MG/1
TABLET ORAL
Qty: 90 TAB | Refills: 1 | Status: SHIPPED | OUTPATIENT
Start: 2019-09-24 | End: 2020-03-24

## 2019-09-24 RX ORDER — AMLODIPINE BESYLATE 5 MG/1
TABLET ORAL
Qty: 90 TAB | Refills: 1 | Status: SHIPPED | OUTPATIENT
Start: 2019-09-24 | End: 2019-12-23

## 2019-09-24 NOTE — TELEPHONE ENCOUNTER
Called patient and reviewed MRI findings of left breast.  There is a lesion in the outer quadrant of the left breast and radiology recommends biopsy. Patient voices understanding and would like to proceed with further evaluation. Toño Flowers, can you please call make an appointment with Dr. Sandoval Officer office. Please forward them all the information and if they need the actual imaging can you please facilitate transfer of images to them. Ms. Yari Laird reports that the appointment will have to be in the afternoon and any day of the week except for Tuesday. Please see if she can be seen in the coming week.

## 2019-09-25 ENCOUNTER — TELEPHONE (OUTPATIENT)
Dept: INTERNAL MEDICINE CLINIC | Age: 84
End: 2019-09-25

## 2019-09-25 NOTE — TELEPHONE ENCOUNTER
Appt has been scheduled with Dr Gauri Doshi, 2:00 pm on 10/3/19. Notified Pt and she verbalized understanding.

## 2019-09-25 NOTE — TELEPHONE ENCOUNTER
----- Message from Jw Herring sent at 9/25/2019  2:39 PM EDT -----  Regarding: Dr. Elizabeth Hernandez Message/Vendor Calls    Caller's first and last name:      Reason for call:  Appointment     Callback required yes/no and why:  No, just informing     Best contact number(s):  [phone]    Details to clarify the request:  Pt rescheduled her appointment to 10/10    Jw Herring

## 2019-09-26 NOTE — TELEPHONE ENCOUNTER
LVM advising Pt to request images from [address] [place] [address] and bring disc to her appt with Dr Gio Sue.

## 2019-10-03 DIAGNOSIS — I10 ESSENTIAL HYPERTENSION: ICD-10-CM

## 2019-10-03 DIAGNOSIS — Z79.01 CHRONIC ANTICOAGULATION: ICD-10-CM

## 2019-10-07 ENCOUNTER — HOSPITAL ENCOUNTER (OUTPATIENT)
Dept: LAB | Age: 84
Discharge: HOME | End: 2019-10-07
Payer: MEDICARE

## 2019-10-07 PROCEDURE — 85610 PROTHROMBIN TIME: CPT

## 2019-10-07 PROCEDURE — 36415 COLL VENOUS BLD VENIPUNCTURE: CPT

## 2019-10-07 PROCEDURE — 80048 BASIC METABOLIC PNL TOTAL CA: CPT

## 2019-10-07 PROCEDURE — 85025 COMPLETE CBC W/AUTO DIFF WBC: CPT

## 2019-10-08 LAB
BASOPHILS # BLD AUTO: 0.1 X10E3/UL (ref 0–0.2)
BASOPHILS NFR BLD AUTO: 1 %
BUN SERPL-MCNC: 16 MG/DL (ref 10–36)
BUN/CREAT SERPL: 25 (ref 12–28)
CALCIUM SERPL-MCNC: 9.7 MG/DL (ref 8.7–10.3)
CHLORIDE SERPL-SCNC: 98 MMOL/L (ref 96–106)
CO2 SERPL-SCNC: 25 MMOL/L (ref 20–29)
CREAT SERPL-MCNC: 0.63 MG/DL (ref 0.57–1)
EOSINOPHIL # BLD AUTO: 0.1 X10E3/UL (ref 0–0.4)
EOSINOPHIL NFR BLD AUTO: 2 %
ERYTHROCYTE [DISTWIDTH] IN BLOOD BY AUTOMATED COUNT: 12.1 % (ref 12.3–15.4)
GLUCOSE SERPL-MCNC: 95 MG/DL (ref 65–99)
HCT VFR BLD AUTO: 45.5 % (ref 34–46.6)
HGB BLD-MCNC: 15.4 G/DL (ref 11.1–15.9)
IMM GRANULOCYTES # BLD AUTO: 0 X10E3/UL (ref 0–0.1)
IMM GRANULOCYTES NFR BLD AUTO: 0 %
INR PPP: 2.2 (ref 0.8–1.2)
LYMPHOCYTES # BLD AUTO: 1 X10E3/UL (ref 0.7–3.1)
LYMPHOCYTES NFR BLD AUTO: 21 %
MCH RBC QN AUTO: 33.3 PG (ref 26.6–33)
MCHC RBC AUTO-ENTMCNC: 33.8 G/DL (ref 31.5–35.7)
MCV RBC AUTO: 98 FL (ref 79–97)
MONOCYTES # BLD AUTO: 0.5 X10E3/UL (ref 0.1–0.9)
MONOCYTES NFR BLD AUTO: 9 %
NEUTROPHILS # BLD AUTO: 3.3 X10E3/UL (ref 1.4–7)
NEUTROPHILS NFR BLD AUTO: 67 %
PLATELET # BLD AUTO: 170 X10E3/UL (ref 150–450)
POTASSIUM SERPL-SCNC: 4.1 MMOL/L (ref 3.5–5.2)
PROTHROMBIN TIME: 21.2 SEC (ref 9.1–12)
RBC # BLD AUTO: 4.63 X10E6/UL (ref 3.77–5.28)
SODIUM SERPL-SCNC: 142 MMOL/L (ref 134–144)
WBC # BLD AUTO: 5 X10E3/UL (ref 3.4–10.8)

## 2019-10-10 ENCOUNTER — HOSPITAL ENCOUNTER (OUTPATIENT)
Dept: LAB | Age: 84
Discharge: HOME | End: 2019-10-10

## 2019-10-10 ENCOUNTER — OFFICE VISIT (OUTPATIENT)
Dept: SURGERY | Age: 84
End: 2019-10-10

## 2019-10-10 ENCOUNTER — DOCUMENTATION ONLY (OUTPATIENT)
Dept: SURGERY | Age: 84
End: 2019-10-10

## 2019-10-10 VITALS
HEART RATE: 73 BPM | DIASTOLIC BLOOD PRESSURE: 100 MMHG | BODY MASS INDEX: 19.93 KG/M2 | WEIGHT: 124 LBS | HEIGHT: 66 IN | SYSTOLIC BLOOD PRESSURE: 150 MMHG

## 2019-10-10 DIAGNOSIS — N63.20 BREAST MASS, LEFT: Primary | ICD-10-CM

## 2019-10-10 PROCEDURE — 88360 TUMOR IMMUNOHISTOCHEM/MANUAL: CPT

## 2019-10-10 NOTE — PROGRESS NOTES
HISTORY OF PRESENT ILLNESS  Yuliana Hernandez is a 80 y.o. female. HPI NEW patient referral for consultation for a palpable LEFT breast lump. She found the lump about 1 month ago and then had imaging done. She denies any nipple inversion or discharge. The patient is accompanied by her daughter. OB History    None      Obstetric Comments   Menarche 15 LMP age 39, # of children 3, age of 4st delivery 21, Hysterectomy/oophorectomy no/no, Breast bx none, history of breast feeding no, BCP no, Hormone therapy none               The patient is on Coumadin due to afib. Family history - maternal cousin  Mammogram, ultrasound and MRI done at [facility] 1 4   LEFT breast 2:00 round enhancing mass 1.2 X 1 X 1 cm. palpable     Physical Exam   Constitutional: She is well-developed, well-nourished, and in no distress. Cardiovascular: Normal rate and regular rhythm. Pulmonary/Chest: Effort normal and breath sounds normal. Left breast exhibits mass (1 cm hard fixed irregular mass with subtle skin dimple 4:00 1/3). Lymphadenopathy:     She has no axillary adenopathy. Skin: Skin is warm and dry. ROS  US - Guided Core Biopsy  Indication : Left Breast mass lower inner quadrant 4:00 1/3 palpable. Ultrasound Findings: 8 mm irregular mass, taller than wide  Prep : alcohol. Anesthesia : 1% lidocaine with epinephrine, 5cc. Device : The hand-held 10 gauge BARD needle was inserted through the lesion and captured tissue with real-time Ultrasound Confirmation. .   Core Sampling :  4 cores were obtained. Marker: clip placed, twirl   Dressing : Steristrips, gauze and tape. Instructions : Remove gauze this evening. Remove steristrips in one week. Tolerance: Pt tolerated procedure with no discomfort  Pathology : 9/2019 LEFT. 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  DE 90%, Her 2 -  Concordance: yes    Past Medical History:   Diagnosis Date    Diverticula, colon     Hypertension     IBS (irritable bowel syndrome)     Irregular heart beat      Past Surgical History:   Procedure Laterality Date    HX  SECTION      X 3    HX COLONOSCOPY  8/28/15    with endoscopy day before    HX HYSTERECTOMY        OB History    None      Obstetric Comments   Menarche 15 LMP age 39, # of children 3, age of 4st delivery 21, Hysterectomy/oophorectomy no/no, Breast bx none, history of breast feeding no, BCP no, Hormone therapy none               Family History   Problem Relation Age of Onset    Tuberculosis Mother      Social History     Tobacco Use    Smoking status: Former Smoker    Smokeless tobacco: Never Used   Substance Use Topics    Alcohol use: Yes     Alcohol/week: 1.0 standard drinks     Types: 1 Glasses of wine per week      Prior to Admission medications    Medication Sig Start Date End Date Taking? Authorizing Provider   amLODIPine (NORVASC) 5 mg tablet TAKE ONE TABLET BY MOUTH ONE TIME DAILY 19  Yes Mavis Ferro MD   bisoprolol-hydroCHLOROthiazide [facility]) 10-6.25 mg per tablet TAKE ONE TABLET BY MOUTH ONE TIME DAILY 19  Yes Mavis Ferro MD   nitrofurantoin (MACRODANTIN) 50 mg capsule TAKE ONE CAPSULE BY MOUTH ONE TIME DAILY 19  Yes Mavis Ferro MD   ALPRAZolam Virgia Latus) 0.5 mg tablet Take 1 tablet 60 minutes before procedure. May repeat just before procedure if anxiety level is still high 19  Yes Mavis Ferro MD   methocarbamol (ROBAXIN) 500 mg tablet Take 1 Tab by mouth four (4) times daily as needed for Pain.  19  Yes Mavis Ferro MD   warfarin (JANTOVEN) 5 mg tablet TAKE ONE TABLET BY MOUTH ONE TIME DAILY 19  Yes Mavis Ferro MD   celecoxib (CELEBREX) 200 mg capsule TAKE ONE CAPSULE BY MOUTH ONE TIME DAILY AS NEEDED 19  Yes Mavis Ferro MD   lisinopril (PRINIVIL, ZESTRIL) 10 mg tablet TAKE ONE TABLET BY MOUTH ONE TIME DAILY 19  Yes Mavis Ferro MD   fexofenadine (ALLEGRA) 60 mg tablet Take  by mouth.   Yes Provider, Historical   diclofenac (VOLTAREN) 1 % gel Apply 2 g to affected area four (4) times daily. 5/9/19  Yes Sunita Monroy MD   fluticasone propionate (FLONASE) 50 mcg/actuation nasal spray USE TWO SPRAYS IN THE AFFECTED NOSTRIL ONE TIME DAILY 5/5/19  Yes Sunita Monroy MD   polyethylene glycol (MIRALAX) 17 gram packet MIX 1 PACKET IN LIQUID AND DRINK BY MOUTH DAILY 1/14/19  Yes Sunita Monroy MD      Allergies   Allergen Reactions    Pcn [Penicillins] Hives       Impression/PLAN      ICD-10-CM ICD-9-CM    1. Breast mass, left N63.20 611.72      LEFT breast mass biopsied  ER positive breast cancer    Called patient and discussed treatment options:  1. Do nothing  2. Lumpectomy  3. Endocrine therapy    Pt would like to try endocrine therapy  We discussed risks and benefits. Common side effects include hot flashes and arthritic pain. She already has some arthritis. Osteoporosis is a risk factor. She is willing to try anastrazole   If she tolerates anastrazole I will see her in 3 months to repeat ultrasound to see if there is a change in her tumor.

## 2019-10-10 NOTE — PROGRESS NOTES
[facility]  OFFICE PROCEDURE PROGRESS NOTE        Chart reviewed for the following:   I, Dr. Waylon Rausch, have reviewed the History, Physical and updated the Allergic reactions for [address] performed immediately prior to start of procedure:   IDr. Waylon have performed the following reviews on Ed Capps prior to the start of the procedure:            * Patient was identified by name and date of birth   * Agreement on procedure being performed was verified  * Risks and Benefits explained to the patient  * Procedure site verified and marked as necessary  * Patient was positioned for comfort  * Consent was signed and verified     Time: 430pm      Date of procedure: 10/10/2019    Procedure performed by:  Waylon Rausch MD    Provider assisted by: Sherrie Vaughan    Patient assisted by: Sherrie Vaughan    How tolerated by patient: Patient tolerated the procedure well without complications. Denies pain post biopsy. Post Procedural Pain Scale: 0 none    Comments: Written and verbal post biopsy instructions reviewed with and given to patient with her understanding.

## 2019-10-15 ENCOUNTER — TELEPHONE (OUTPATIENT)
Dept: SURGERY | Age: 84
End: 2019-10-15

## 2019-10-15 PROBLEM — C50.912 BREAST CANCER, LEFT (HCC): Status: ACTIVE | Noted: 2019-10-15

## 2019-10-15 RX ORDER — ANASTROZOLE 1 MG/1
1 TABLET ORAL DAILY
Qty: 30 TAB | Refills: 0 | Status: SHIPPED | OUTPATIENT
Start: 2019-10-15 | End: 2019-11-21

## 2019-10-15 NOTE — TELEPHONE ENCOUNTER
anastrozole prescription sent to her pharmacy  If she tolerates the prescription she will call in one month for a refill  Follow up in 3 months for LEFT breast ultrasound

## 2019-10-31 ENCOUNTER — TELEPHONE (OUTPATIENT)
Dept: SURGERY | Age: 84
End: 2019-10-31

## 2019-10-31 NOTE — TELEPHONE ENCOUNTER
Pt called  She would like to proceed with surgery. She took the anastrazole and felt like it exacerbated her IBS.   Her symptoms have improved after stopping the medication,  Will coordinate surgery with her daughter's schedule

## 2019-11-06 NOTE — TELEPHONE ENCOUNTER
SCHEDULED SURGERY AT [facility] ON 12-13-19 AT 7:30 AM; PATIENT WILL ARRIVE AT 6:00 AM AND CHECK IN ON THE 2ND FL AT PATIENT REGISTRATION    PREADMISSION TESTING: RN WILL CALL PATIENT    POST OP: [address]  OFFICE ON 12-19-19 AT 3:00 PM    PATIENT HAS BEEN CALLED AND GIVEN INFORMATION AND INSTRUCTIONS HAVE BEEN MAILED TO HER HOME ADDRESS

## 2019-11-09 RX ORDER — LISINOPRIL 10 MG/1
TABLET ORAL
Qty: 90 TAB | Refills: 1 | Status: SHIPPED | OUTPATIENT
Start: 2019-11-09 | End: 2020-06-11

## 2019-11-11 RX ORDER — LISINOPRIL 10 MG/1
TABLET ORAL
Qty: 90 TAB | Refills: 1 | Status: SHIPPED | OUTPATIENT
Start: 2019-11-11 | End: 2019-12-11

## 2019-11-21 ENCOUNTER — OFFICE VISIT (OUTPATIENT)
Dept: INTERNAL MEDICINE CLINIC | Age: 84
End: 2019-11-21

## 2019-11-21 VITALS
RESPIRATION RATE: 16 BRPM | TEMPERATURE: 97.7 F | SYSTOLIC BLOOD PRESSURE: 120 MMHG | WEIGHT: 124 LBS | BODY MASS INDEX: 19.93 KG/M2 | HEART RATE: 82 BPM | HEIGHT: 66 IN | DIASTOLIC BLOOD PRESSURE: 62 MMHG | OXYGEN SATURATION: 98 %

## 2019-11-21 DIAGNOSIS — K58.1 IRRITABLE BOWEL SYNDROME WITH CONSTIPATION: ICD-10-CM

## 2019-11-21 DIAGNOSIS — Z23 ENCOUNTER FOR IMMUNIZATION: ICD-10-CM

## 2019-11-21 DIAGNOSIS — I48.91 ATRIAL FIBRILLATION, UNSPECIFIED TYPE (HCC): Primary | ICD-10-CM

## 2019-11-21 DIAGNOSIS — F41.9 ANXIETY: ICD-10-CM

## 2019-11-21 DIAGNOSIS — C50.912 MALIGNANT NEOPLASM OF LEFT FEMALE BREAST, UNSPECIFIED ESTROGEN RECEPTOR STATUS, UNSPECIFIED SITE OF BREAST (HCC): ICD-10-CM

## 2019-11-21 LAB
INR BLD: 2.5
PT POC: 30.1 SECONDS
VALID INTERNAL CONTROL?: YES

## 2019-11-21 RX ORDER — ALPRAZOLAM 0.25 MG/1
0.25 TABLET ORAL
Qty: 30 TAB | Refills: 0 | Status: ON HOLD | OUTPATIENT
Start: 2019-11-21 | End: 2020-02-14

## 2019-11-21 NOTE — PROGRESS NOTES
Tiburcio Merritt is a [address] y.o. female who presents today for Hypertension and Irregular Heart Beat  . She has a history of   Patient Active Problem List   Diagnosis Code    HTN (hypertension) I10    IBS (irritable bowel syndrome) K58.9    Cataract H26.9    Diverticulosis K57.90    Atrial fibrillation (HCC) I48.91    Chronic cystitis N30.20    Chronic anticoagulation Z79.01    Advanced care planning/counseling discussion Z71.89    Breast cancer, left ([address] C50.912   . Today patient is here for follow-up. Kanu Izaguirre Notes that her IBS overall is stable. She controls her constipation with prune juice. Anticoagulation  Patient here for followup of chronic anticoagulation. Indication: Atrial Fibrillation. Bleeding Signs/Symptoms:  None. Compliance with warfarin:  Yes  INR's are drawn regularly and have been Therapeutic. Lab Results   Component Value Date/Time    INR 2.2 (H) 10/07/2019 02:49 PM    INR 2.6 (H) 06/28/2019 02:24 PM    INR 2.2 (H) 03/25/2019 02:27 PM    INR POC 2.5 11/21/2019 03:20 PM    INR POC 2.2 08/19/2019 03:38 PM    INR POC 2.1 05/09/2019 03:08 PM    Prothrombin time 21.2 (H) 10/07/2019 02:49 PM    Prothrombin time 25.9 (H) 06/28/2019 02:24 PM    Prothrombin time 21.6 (H) 03/25/2019 02:27 PM     Hypertension - stable. Hypertension ROS: taking medications as instructed, no medication side effects noted, no TIA's, no chest pain on exertion, no dyspnea on exertion, no swelling of ankles     reports that she has quit smoking. She has never used smokeless tobacco.    reports current alcohol use of about 1.0 standard drinks of alcohol per week. BP Readings from Last 2 Encounters:   11/21/19 100/62   10/10/19 (!) 150/100     Breast Cancer: Recent diagnosis. ER MS positive HER-2 negative. She was placed on his anastrozole but had side effects from this and has opted to proceed with lump ectomy. This is scheduled for 13 December.   We discussed holding Coumadin 5 days before procedure. ROS  Review of Systems   Constitutional: Negative for chills, fever and weight loss. HENT: Negative for congestion and sore throat. Eyes: Negative for blurred vision, double vision and photophobia. Respiratory: Negative for cough and shortness of breath. Cardiovascular: Negative for chest pain, palpitations and leg swelling. Gastrointestinal: Negative for abdominal pain, constipation, diarrhea, heartburn, nausea and vomiting. Genitourinary: Negative for dysuria, frequency and urgency. Musculoskeletal: Positive for back pain, joint pain and neck pain. Negative for myalgias. Skin: Negative for rash. Neurological: Negative. Negative for headaches. Endo/Heme/Allergies: Does not bruise/bleed easily. Psychiatric/Behavioral: Negative for memory loss and suicidal ideas. Visit Vitals  /62 (BP 1 Location: Left arm, BP Patient Position: Sitting)   Pulse 82   Temp 97.7 °F (36.5 °C) (Oral)   Resp 16   Ht 5' 6\" (1.676 m)   Wt 124 lb (56.2 kg)   SpO2 98%   BMI 20.01 kg/m²       Physical Exam  Constitutional:       Appearance: She is well-developed. HENT:      Head: Normocephalic and atraumatic. Cardiovascular:      Rate and Rhythm: Normal rate and regular rhythm. Heart sounds: No murmur. Pulmonary:      Effort: Pulmonary effort is normal. No respiratory distress. Abdominal:      General: Abdomen is flat. Bowel sounds are normal. There is no distension. Palpations: Abdomen is soft. Skin:     General: Skin is warm and dry. Neurological:      Mental Status: She is alert and oriented to person, place, and time.    Psychiatric:         Behavior: Behavior normal.           Current Outpatient Medications   Medication Sig    lisinopril (PRINIVIL, ZESTRIL) 10 mg tablet TAKE ONE TABLET BY MOUTH ONE TIME DAILY    lisinopril (PRINIVIL, ZESTRIL) 10 mg tablet TAKE ONE TABLET BY MOUTH ONE TIME DAILY    amLODIPine (NORVASC) 5 mg tablet TAKE ONE TABLET BY MOUTH ONE TIME DAILY    bisoprolol-hydroCHLOROthiazide (ZIAC) 10-6.25 mg per tablet TAKE ONE TABLET BY MOUTH ONE TIME DAILY    nitrofurantoin (MACRODANTIN) 50 mg capsule TAKE ONE CAPSULE BY MOUTH ONE TIME DAILY    ALPRAZolam (XANAX) 0.5 mg tablet Take 1 tablet 60 minutes before procedure. May repeat just before procedure if anxiety level is still high    warfarin (JANTOVEN) 5 mg tablet TAKE ONE TABLET BY MOUTH ONE TIME DAILY    celecoxib (CELEBREX) 200 mg capsule TAKE ONE CAPSULE BY MOUTH ONE TIME DAILY AS NEEDED    diclofenac (VOLTAREN) 1 % gel Apply 2 g to affected area four (4) times daily.  fluticasone propionate (FLONASE) 50 mcg/actuation nasal spray USE TWO SPRAYS IN THE AFFECTED NOSTRIL ONE TIME DAILY    polyethylene glycol (MIRALAX) 17 gram packet MIX 1 PACKET IN LIQUID AND DRINK BY MOUTH DAILY    methocarbamol (ROBAXIN) 500 mg tablet Take 1 Tab by mouth four (4) times daily as needed for Pain. No current facility-administered medications for this visit. Past Medical History:   Diagnosis Date    [facility]     Diverticula, colon     Hypertension     IBS (irritable bowel syndrome)     Irregular heart beat       Past Surgical History:   Procedure Laterality Date    HX  SECTION      X 3    HX COLONOSCOPY  8/28/15    with endoscopy day before    HX HYSTERECTOMY        Social History     Tobacco Use    Smoking status: Former Smoker    Smokeless tobacco: Never Used   Substance Use Topics    Alcohol use: Yes     Alcohol/week: 1.0 standard drinks     Types: 1 Glasses of wine per week      Family History   Problem Relation Age of Onset    Tuberculosis Mother         Allergies   Allergen Reactions    Pcn [Penicillins] Hives        Assessment/Plan  Diagnoses and all orders for this visit:    1. Atrial fibrillation, unspecified type ([facility] -INR has been stable. Patient will be instructed to hold Coumadin 5 days before procedure as well as all NSAIDs.   She is proceeding with lumpectomy. -     AMB POC PT/INR    2. Encounter for immunization  -     INFLUENZA VACCINE INACTIVATED (IIV), SUBUNIT, ADJUVANTED, IM  -     ADMIN INFLUENZA VIRUS VAC    3. Malignant neoplasm of left female breast, unspecified estrogen receptor status, unspecified site of breast ([facility] -patient did not tolerate anastrozole. She will undergo lumpectomy early next month. 4. Irritable bowel syndrome with constipation -stable overall. 5. Anxiety -refill she rarely uses this. -     ALPRAZolam (XANAX) 0.25 mg tablet; Take 1 Tab by mouth two (2) times daily as needed for Anxiety. Max Daily Amount: 0.5 mg.            Min Vincent MD  11/21/2019    This note was created with the help of speech recognition software Tomás Shaw) and may contain some 'sound alike' errors.

## 2019-12-02 DIAGNOSIS — C50.412 MALIGNANT NEOPLASM OF UPPER-OUTER QUADRANT OF LEFT FEMALE BREAST, UNSPECIFIED ESTROGEN RECEPTOR STATUS (HCC): Primary | ICD-10-CM

## 2019-12-11 ENCOUNTER — HOSPITAL ENCOUNTER (OUTPATIENT)
Dept: PREADMISSION TESTING | Age: 84
Discharge: HOME | End: 2019-12-11
Payer: MEDICARE

## 2019-12-11 VITALS
WEIGHT: 123.4 LBS | RESPIRATION RATE: 14 BRPM | HEIGHT: 66 IN | HEART RATE: 82 BPM | TEMPERATURE: 97.9 F | SYSTOLIC BLOOD PRESSURE: 143 MMHG | OXYGEN SATURATION: 94 % | DIASTOLIC BLOOD PRESSURE: 71 MMHG | BODY MASS INDEX: 19.83 KG/M2

## 2019-12-11 DIAGNOSIS — C50.412 MALIGNANT NEOPLASM OF UPPER-OUTER QUADRANT OF LEFT FEMALE BREAST, UNSPECIFIED ESTROGEN RECEPTOR STATUS (HCC): ICD-10-CM

## 2019-12-11 LAB
ANION GAP SERPL CALC-SCNC: 6 MMOL/L (ref 5–15)
BUN SERPL-MCNC: 15 MG/DL (ref 6–20)
BUN/CREAT SERPL: 23 (ref 12–20)
CALCIUM SERPL-MCNC: 9.4 MG/DL (ref 8.5–10.1)
CHLORIDE SERPL-SCNC: 105 MMOL/L (ref 97–108)
CO2 SERPL-SCNC: 29 MMOL/L (ref 21–32)
CREAT SERPL-MCNC: 0.64 MG/DL (ref 0.55–1.02)
GLUCOSE SERPL-MCNC: 106 MG/DL (ref 65–100)
POTASSIUM SERPL-SCNC: 4.4 MMOL/L (ref 3.5–5.1)
SODIUM SERPL-SCNC: 140 MMOL/L (ref 136–145)

## 2019-12-11 PROCEDURE — 36415 COLL VENOUS BLD VENIPUNCTURE: CPT

## 2019-12-11 PROCEDURE — 93005 ELECTROCARDIOGRAM TRACING: CPT

## 2019-12-11 PROCEDURE — 80048 BASIC METABOLIC PNL TOTAL CA: CPT

## 2019-12-11 NOTE — PERIOP NOTES
[address] [address] [facility]   MAIN OR                                  [phone]   MAIN PRE OP                          [phone]                                                                                AMBULATORY PRE OP          [phone]  PRE-ADMISSION TESTING    [phone]   Surgery Date:  Friday 12/13/19        Is surgery arrival time given by surgeon? NO  If NO, [facility] staff will call you between 3 and 7pm the day before your surgery with your arrival time. (If your surgery is on a Monday, we will call you the Friday before.)    Call [phone] after 7pm Monday-Friday if you did not receive this call. INSTRUCTIONS BEFORE YOUR SURGERY   When You  Arrive Arrive at the 2nd [address] [facility] on the day of your surgery  Have your insurance card, photo ID, and any copayment (if needed)   Food   and   Drink NO food or drink after midnight the night before surgery    This means NO water, gum, mints, coffee, juice, etc.  No alcohol (beer, wine, liquor) 24 hours before and after surgery   Medications to   TAKE   Morning of Surgery MEDICATIONS TO TAKE THE MORNING OF SURGERY WITH A SIP OF WATER:    Xanax   Amlodipine   Medications  To  STOP      7 days before surgery  Non-Steroidal anti-inflammatory Drugs (NSAID's): for example, Ibuprofen (Advil, Motrin), Naproxen (Aleve)   Aspirin, if taking for pain    Herbal supplements, vitamins, and fish oil   Other:  (Pain medications not listed above, including Tylenol may be taken)   Blood  Thinners  If you take  Aspirin, Plavix, Coumadin, or any blood-thinning or anti-blood clot medicine, talk to the doctor who prescribed the medications for pre-operative instructions.  As per instructions of PCP, stop Warfarin as of Saturday 12/7/19 until after surgery.    Bathing Clothing  Jewelry  Valuables      If you shower the morning of surgery, please do not apply anything to your skin (lotions, powders, deodorant, or makeup, especially mascara)   Follow Chlorhexidine [employer] body wash instructions provided to you during PAT appointment. Begin 3 days prior to surgery.  Do not shave or trim anywhere 24 hours before surgery   Wear your hair loose or down; no pony-tails, buns, or metal hair clips   Wear loose, comfortable, clean clothes   Wear glasses instead of contacts   Leave money, valuables, and jewelry, including body piercings, at home   Going Home - or Spending the Night  SAME-DAY SURGERY: You must have a responsible adult drive you home and stay with you 24 hours after surgery   ADMITS: If your doctor is keeping you in the hospital after surgery, leave personal belongings/luggage in your car until you have a hospital room number. Hospital discharge time is 12 noon  Drivers must be here before 12 noon unless you are told differently   Special Instructions Chlor-hex  wash reviewed, incentive spirometry/deep breathing, leg exercises to prevent DVT, s/s of infection & what to report to MD all reviewed with patient    Pt verified understanding by teach back and return demonstration     Follow all instructions so your surgery wont be cancelled. Please, be on time. If a situation occurs and you are delayed the day of surgery, call  [phone]. If your physical condition changes (like a fever, cold, flu, etc.) call your surgeon. Home medication(s) reviewed and verified per list during PAT appointment. The patient was contacted  in person. The patient verbalizes understanding of all instructions and does not  need reinforcement.

## 2019-12-12 ENCOUNTER — ANESTHESIA EVENT (OUTPATIENT)
Dept: SURGERY | Age: 84
End: 2019-12-12
Payer: MEDICARE

## 2019-12-12 LAB
ATRIAL RATE: 70 BPM
CALCULATED R AXIS, ECG10: -22 DEGREES
CALCULATED T AXIS, ECG11: 31 DEGREES
DIAGNOSIS, 93000: NORMAL
Q-T INTERVAL, ECG07: 408 MS
QRS DURATION, ECG06: 80 MS
QTC CALCULATION (BEZET), ECG08: 452 MS
VENTRICULAR RATE, ECG03: 74 BPM

## 2019-12-12 NOTE — PERIOP NOTES
Left VM on both phone numbers instructing pt to take Miguel Ángel Hoit if she takes it in the morning. According to documentation, it appears she takes it in the morning. Left [phone] as call back number for questions/concerns. 12/13/19 @ 0801:  Rec'd VM from pt yesterday @ [phone] inquiring about correct meds for this morning. PTA meds from this morning's admission indicate that she did take her Ziac before coming today.

## 2019-12-13 ENCOUNTER — HOSPITAL ENCOUNTER (OUTPATIENT)
Age: 84
Setting detail: OUTPATIENT SURGERY
Discharge: HOME | End: 2019-12-13
Attending: SURGERY | Admitting: SURGERY
Payer: MEDICARE

## 2019-12-13 ENCOUNTER — ANESTHESIA (OUTPATIENT)
Dept: SURGERY | Age: 84
End: 2019-12-13
Payer: MEDICARE

## 2019-12-13 VITALS
WEIGHT: 122.8 LBS | TEMPERATURE: 98.2 F | HEART RATE: 67 BPM | DIASTOLIC BLOOD PRESSURE: 87 MMHG | OXYGEN SATURATION: 96 % | SYSTOLIC BLOOD PRESSURE: 117 MMHG | RESPIRATION RATE: 18 BRPM | BODY MASS INDEX: 19.73 KG/M2 | HEIGHT: 66 IN

## 2019-12-13 DIAGNOSIS — Z17.0 MALIGNANT NEOPLASM OF UPPER-OUTER QUADRANT OF LEFT BREAST IN FEMALE, ESTROGEN RECEPTOR POSITIVE (HCC): Primary | ICD-10-CM

## 2019-12-13 DIAGNOSIS — C50.412 MALIGNANT NEOPLASM OF UPPER-OUTER QUADRANT OF LEFT BREAST IN FEMALE, ESTROGEN RECEPTOR POSITIVE (HCC): Primary | ICD-10-CM

## 2019-12-13 PROCEDURE — 77030018836 HC SOL IRR NACL ICUM -A: Performed by: SURGERY

## 2019-12-13 PROCEDURE — 77030031139 HC SUT VCRL2 J&J -A: Performed by: SURGERY

## 2019-12-13 PROCEDURE — 74011250636 HC RX REV CODE- 250/636: Performed by: NURSE ANESTHETIST, CERTIFIED REGISTERED

## 2019-12-13 PROCEDURE — 76210000040 HC AMBSU PH I REC FIRST 0.5 HR: Performed by: SURGERY

## 2019-12-13 PROCEDURE — 76030000001 HC AMB SURG OR TIME 1 TO 1.5: Performed by: SURGERY

## 2019-12-13 PROCEDURE — 77030002996 HC SUT SLK J&J -A: Performed by: SURGERY

## 2019-12-13 PROCEDURE — 74011250636 HC RX REV CODE- 250/636: Performed by: ANESTHESIOLOGY

## 2019-12-13 PROCEDURE — 76210000050 HC AMBSU PH II REC 0.5 TO 1 HR: Performed by: SURGERY

## 2019-12-13 PROCEDURE — 74011000250 HC RX REV CODE- 250: Performed by: SURGERY

## 2019-12-13 PROCEDURE — 77030040361 HC SLV COMPR DVT MDII -B

## 2019-12-13 PROCEDURE — 88307 TISSUE EXAM BY PATHOLOGIST: CPT

## 2019-12-13 PROCEDURE — 77030039266 HC ADH SKN EXOFIN S2SG -A: Performed by: SURGERY

## 2019-12-13 PROCEDURE — 76060000062 HC AMB SURG ANES 1 TO 1.5 HR: Performed by: SURGERY

## 2019-12-13 PROCEDURE — 77030002933 HC SUT MCRYL J&J -A: Performed by: SURGERY

## 2019-12-13 PROCEDURE — 88342 IMHCHEM/IMCYTCHM 1ST ANTB: CPT

## 2019-12-13 PROCEDURE — 74011000250 HC RX REV CODE- 250: Performed by: ANESTHESIOLOGY

## 2019-12-13 PROCEDURE — 88305 TISSUE EXAM BY PATHOLOGIST: CPT

## 2019-12-13 PROCEDURE — 77030040922 HC BLNKT HYPOTHRM STRY -A

## 2019-12-13 PROCEDURE — 74011250636 HC RX REV CODE- 250/636: Performed by: SURGERY

## 2019-12-13 RX ORDER — LIDOCAINE HYDROCHLORIDE 10 MG/ML
0.1 INJECTION, SOLUTION EPIDURAL; INFILTRATION; INTRACAUDAL; PERINEURAL AS NEEDED
Status: DISCONTINUED | OUTPATIENT
Start: 2019-12-13 | End: 2019-12-13 | Stop reason: HOSPADM

## 2019-12-13 RX ORDER — DEXAMETHASONE SODIUM PHOSPHATE 4 MG/ML
INJECTION, SOLUTION INTRA-ARTICULAR; INTRALESIONAL; INTRAMUSCULAR; INTRAVENOUS; SOFT TISSUE AS NEEDED
Status: DISCONTINUED | OUTPATIENT
Start: 2019-12-13 | End: 2019-12-13 | Stop reason: HOSPADM

## 2019-12-13 RX ORDER — PROPOFOL 10 MG/ML
INJECTION, EMULSION INTRAVENOUS
Status: DISCONTINUED | OUTPATIENT
Start: 2019-12-13 | End: 2019-12-13 | Stop reason: HOSPADM

## 2019-12-13 RX ORDER — BUPIVACAINE HYDROCHLORIDE AND EPINEPHRINE 5; 5 MG/ML; UG/ML
30 INJECTION, SOLUTION EPIDURAL; INTRACAUDAL; PERINEURAL
Status: DISCONTINUED | OUTPATIENT
Start: 2019-12-13 | End: 2019-12-13 | Stop reason: HOSPADM

## 2019-12-13 RX ORDER — METHYLENE BLUE 10 MG/ML
INJECTION INTRAVENOUS AS NEEDED
Status: DISCONTINUED | OUTPATIENT
Start: 2019-12-13 | End: 2019-12-13 | Stop reason: HOSPADM

## 2019-12-13 RX ORDER — HYDROMORPHONE HYDROCHLORIDE 1 MG/ML
.25-1 INJECTION, SOLUTION INTRAMUSCULAR; INTRAVENOUS; SUBCUTANEOUS
Status: DISCONTINUED | OUTPATIENT
Start: 2019-12-13 | End: 2019-12-13 | Stop reason: HOSPADM

## 2019-12-13 RX ORDER — FENTANYL CITRATE 50 UG/ML
INJECTION, SOLUTION INTRAMUSCULAR; INTRAVENOUS AS NEEDED
Status: DISCONTINUED | OUTPATIENT
Start: 2019-12-13 | End: 2019-12-13 | Stop reason: HOSPADM

## 2019-12-13 RX ORDER — PROPOFOL 10 MG/ML
INJECTION, EMULSION INTRAVENOUS AS NEEDED
Status: DISCONTINUED | OUTPATIENT
Start: 2019-12-13 | End: 2019-12-13 | Stop reason: HOSPADM

## 2019-12-13 RX ORDER — BUPIVACAINE HYDROCHLORIDE AND EPINEPHRINE 5; 5 MG/ML; UG/ML
INJECTION, SOLUTION EPIDURAL; INTRACAUDAL; PERINEURAL AS NEEDED
Status: DISCONTINUED | OUTPATIENT
Start: 2019-12-13 | End: 2019-12-13 | Stop reason: HOSPADM

## 2019-12-13 RX ORDER — HYDROCODONE BITARTRATE AND ACETAMINOPHEN 5; 325 MG/1; MG/1
1 TABLET ORAL
Qty: 6 TAB | Refills: 0 | Status: SHIPPED | OUTPATIENT
Start: 2019-12-13 | End: 2019-12-16

## 2019-12-13 RX ORDER — SODIUM CHLORIDE, SODIUM LACTATE, POTASSIUM CHLORIDE, CALCIUM CHLORIDE 600; 310; 30; 20 MG/100ML; MG/100ML; MG/100ML; MG/100ML
125 INJECTION, SOLUTION INTRAVENOUS CONTINUOUS
Status: DISCONTINUED | OUTPATIENT
Start: 2019-12-13 | End: 2019-12-13 | Stop reason: HOSPADM

## 2019-12-13 RX ORDER — MIDAZOLAM HYDROCHLORIDE 1 MG/ML
2 INJECTION, SOLUTION INTRAMUSCULAR; INTRAVENOUS
Status: DISCONTINUED | OUTPATIENT
Start: 2019-12-13 | End: 2019-12-13 | Stop reason: HOSPADM

## 2019-12-13 RX ORDER — FLUMAZENIL 0.1 MG/ML
0.2 INJECTION INTRAVENOUS
Status: DISCONTINUED | OUTPATIENT
Start: 2019-12-13 | End: 2019-12-13 | Stop reason: HOSPADM

## 2019-12-13 RX ORDER — DIPHENHYDRAMINE HYDROCHLORIDE 50 MG/ML
12.5 INJECTION, SOLUTION INTRAMUSCULAR; INTRAVENOUS AS NEEDED
Status: DISCONTINUED | OUTPATIENT
Start: 2019-12-13 | End: 2019-12-13 | Stop reason: HOSPADM

## 2019-12-13 RX ORDER — NALOXONE HYDROCHLORIDE 0.4 MG/ML
0.2 INJECTION, SOLUTION INTRAMUSCULAR; INTRAVENOUS; SUBCUTANEOUS
Status: DISCONTINUED | OUTPATIENT
Start: 2019-12-13 | End: 2019-12-13 | Stop reason: HOSPADM

## 2019-12-13 RX ADMIN — PROPOFOL 20 MG: 10 INJECTION, EMULSION INTRAVENOUS at 07:51

## 2019-12-13 RX ADMIN — LIDOCAINE HYDROCHLORIDE 0.1 ML: 10 INJECTION, SOLUTION EPIDURAL; INFILTRATION; INTRACAUDAL; PERINEURAL at 06:46

## 2019-12-13 RX ADMIN — SODIUM CHLORIDE, SODIUM LACTATE, POTASSIUM CHLORIDE, AND CALCIUM CHLORIDE 1000 ML: 600; 310; 30; 20 INJECTION, SOLUTION INTRAVENOUS at 06:46

## 2019-12-13 RX ADMIN — PROPOFOL 65 MCG/KG/MIN: 10 INJECTION, EMULSION INTRAVENOUS at 07:40

## 2019-12-13 RX ADMIN — SODIUM CHLORIDE, POTASSIUM CHLORIDE, SODIUM LACTATE AND CALCIUM CHLORIDE: 600; 310; 30; 20 INJECTION, SOLUTION INTRAVENOUS at 06:44

## 2019-12-13 RX ADMIN — HYDROMORPHONE HYDROCHLORIDE 0.5 MG: 1 INJECTION, SOLUTION INTRAMUSCULAR; INTRAVENOUS; SUBCUTANEOUS at 08:51

## 2019-12-13 RX ADMIN — PROPOFOL 40 MG: 10 INJECTION, EMULSION INTRAVENOUS at 07:39

## 2019-12-13 RX ADMIN — FENTANYL CITRATE 50 MCG: 50 INJECTION INTRAMUSCULAR; INTRAVENOUS at 07:37

## 2019-12-13 RX ADMIN — FENTANYL CITRATE 50 MCG: 50 INJECTION INTRAMUSCULAR; INTRAVENOUS at 07:33

## 2019-12-13 RX ADMIN — DEXAMETHASONE SODIUM PHOSPHATE 4 MG: 4 INJECTION, SOLUTION INTRAMUSCULAR; INTRAVENOUS at 08:07

## 2019-12-13 NOTE — H&P
History and Physical    Trevor Newell is a 80 y.o. female. HPI NEW patient referral for consultation for a palpable LEFT breast lump. She found the lump about 1 month ago and then had imaging done. She denies any nipple inversion or discharge. The patient is accompanied by her daughter. OB History    None       Obstetric Comments   Menarche 15 LMP age 39, # of children 3, age of 4st delivery 21, Hysterectomy/oophorectomy no/no, Breast bx none, history of breast feeding no, BCP no, Hormone therapy none                   The patient is on Coumadin due to afib. Family history - maternal cousin  [facility]   LEFT breast 2:00 round enhancing mass 1.2 X 1 X 1 cm. palpable      Physical Exam   Constitutional: She is well-developed, well-nourished, and in no distress. Cardiovascular: Normal rate and regular rhythm. Pulmonary/Chest: Effort normal and breath sounds normal. Left breast exhibits mass (1 cm hard fixed irregular mass with subtle skin dimple 4:00 1/3). Lymphadenopathy:     She has no axillary adenopathy. Skin: Skin is warm and dry.      ROS  US - Guided Core Biopsy  Indication : Left Breast mass lower inner quadrant 4:00 1/3 palpable. Ultrasound Findings: 8 mm irregular mass, taller than wide  Prep : alcohol. Anesthesia : 1% lidocaine with epinephrine, 5cc. Device : The hand-held 10 gauge BARD needle was inserted through the lesion and captured tissue with real-time Ultrasound Confirmation. .   Core Sampling :  4 cores were obtained. Marker: clip placed, twirl   Dressing : Steristrips, gauze and tape. Instructions : Remove gauze this evening. Remove steristrips in one week. Tolerance: Pt tolerated procedure with no discomfort  Pathology : 9/2019 LEFT. 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  KS 90%, Her 2 -  Concordance: yes          Past Medical History:   Diagnosis Date    Diverticula, colon      Hypertension      IBS (irritable bowel syndrome)      Irregular heart beat              Past Surgical History:   Procedure Laterality Date    HX  SECTION         X 3    HX COLONOSCOPY   8/28/15     with endoscopy day before    HX HYSTERECTOMY          OB History    None       Obstetric Comments   Menarche 15 LMP age 39, # of children 3, age of 4st delivery 21, Hysterectomy/oophorectomy no/no, Breast bx none, history of breast feeding no, BCP no, Hormone therapy none                         Family History   Problem Relation Age of Onset    Tuberculosis Mother        Social History            Tobacco Use    Smoking status: Former Smoker    Smokeless tobacco: Never Used   Substance Use Topics    Alcohol use: Yes       Alcohol/week: 1.0 standard drinks       Types: 1 Glasses of wine per week      Prior to Admission medications    Medication Sig Start Date End Date Taking? Authorizing Provider   amLODIPine (NORVASC) 5 mg tablet TAKE ONE TABLET BY MOUTH ONE TIME DAILY 19   Yes Jillian Miranda MD   bisoprolol-hydroCHLOROthiazide [facility]) 10-6.25 mg per tablet TAKE ONE TABLET BY MOUTH ONE TIME DAILY 19   Yes Jillian Miranda MD   nitrofurantoin (MACRODANTIN) 50 mg capsule TAKE ONE CAPSULE BY MOUTH ONE TIME DAILY 19   Yes Jillian Miranda MD   ALPRAZolam [facility]) 0.5 mg tablet Take 1 tablet 60 minutes before procedure. May repeat just before procedure if anxiety level is still high 19   Yes Asif Cummings MD   methocarbamol (ROBAXIN) 500 mg tablet Take 1 Tab by mouth four (4) times daily as needed for Pain.  19   Yes Jillian Miranda MD   warfarin (JANTOVEN) 5 mg tablet TAKE ONE TABLET BY MOUTH ONE TIME DAILY 19   Yes Jillian Miranda MD   celecoxib (CELEBREX) 200 mg capsule TAKE ONE CAPSULE BY MOUTH ONE TIME DAILY AS NEEDED 19   Yes Jillian Miranda MD   lisinopril (PRINIVIL, ZESTRIL) 10 mg tablet TAKE ONE TABLET BY MOUTH ONE TIME DAILY 19   Yes Betty Herbert MD   fexofenadine (ALLEGRA) 60 mg tablet Take  by mouth.     Yes Provider, Historical   diclofenac (VOLTAREN) 1 % gel Apply 2 g to affected area four (4) times daily. 5/9/19   Yes Betty Herbert MD   fluticasone propionate (FLONASE) 50 mcg/actuation nasal spray USE TWO SPRAYS IN THE AFFECTED NOSTRIL ONE TIME DAILY 5/5/19   Yes Betty Herbert MD   polyethylene glycol (MIRALAX) 17 gram packet MIX 1 PACKET IN LIQUID AND DRINK BY MOUTH DAILY 1/14/19   Yes Betty Herbert MD           Allergies   Allergen Reactions    Pcn [Penicillins] Hives         Impression/PLAN         ICD-10-CM ICD-9-CM     1. Breast mass, left N63.20 611.72        LEFT breast mass biopsied  ER positive breast cancer     Called patient and discussed treatment options:  1. Do nothing  2. Lumpectomy  3.  Endocrine therapy     Pt has elected for lumpectomy

## 2019-12-13 NOTE — DISCHARGE INSTRUCTIONS
Discharge Instructions from Dr. Carlos Manzo    Patient Discharge Instructions    Leonardo Tucker / [id number] : 1929    Admitted 2019 Discharged: 2019   What to do at Home  Diet:Regular  Activity: As tolerated. No driving if taking pain medication. Okay to shower or take a bath. You may chose to wear a bra to sleep in for extra support for the next few days. Pain control: Ice pack 20 minutes of every hour until you go to bed tonight. You may use over-the-counter medication as needed (acetominophen, aspirin, ibuprofen). Fill the prescription for hydrocodone if needed. Tomorrow you may put a heat pack on the breast.  Dressing: The skin glue is waterproof. It is okay to wash normally at this site. If the glue is still present after 10 days you should peel it off. Follow up: 2019 3:00 PM   [address] [facility], [address]  Problems/Questions: Call Aba Beltran MD on my cell phone. [phone]    DISCHARGE SUMMARY from your Nurse      PATIENT INSTRUCTIONS    After general anesthesia or intravenous sedation, for 24 hours or while taking prescription Narcotics:  · Limit your activities  · Do not drive and operate hazardous machinery  · Do not make important personal or business decisions  · Do  not drink alcoholic beverages  · If you have not urinated within 8 hours after discharge, please contact your surgeon on call. Report the following to your surgeon:  · Excessive pain, swelling, redness or odor of or around the surgical area  · Temperature over 100.5  · Nausea and vomiting lasting longer than 4 hours or if unable to take medications  · Any signs of decreased circulation or nerve impairment to extremity: change in color, persistent  numbness, tingling, coldness or increase pain  · Any questions      COUGH AND DEEP BREATHE    Breathing deeply and coughing are very important exercises to do after surgery.   Deep breathing and coughing open the little air tubes and air sacks in your lungs. You take deep breaths every day. You may not even notice - it is just something you do when you sigh or yawn. It is a natural exercise you do to keep these air passages open. After surgery, take deep breaths and cough, on purpose. DIRECTIONS:  · Take 10 to 15 slow deep breaths every hour while awake. · Breathe in deeply, and hold it for 2 seconds. · Exhale slowly through puckered lips, like blowing up a balloon. · After every 4th or 5th deep breath, hug your pillow to your chest or belly and give a hard, deep cough. Yes, it will probably hurt. But doing this exercise is a very important part of healing after surgery. Take your pain medicine to help you do this exercise without too much pain. Coughing and deep breathing help prevent bronchitis and pneumonia after surgery. If you had chest or belly surgery, use a pillow as a \"hug sergio\" and hold it tightly to your chest or belly when you cough. ANKLE PUMPS    Ankle pumps increase the circulation of oxygenated blood to your lower extremities and decrease your risk for circulation problems such as blood clots. They also stretch the muscles, tendons and ligaments in your foot and ankle, and prevent joint contracture in the ankle and foot, especially after surgeries on the legs. It is important to do ankle pump exercises regularly after surgery because immobility increases your risk for developing a blood clot. Your doctor may also have you take an Aspirin for the next few days as well. If your doctor did not ask you to take an Aspirin, consult with him before starting Aspirin therapy on your own. The exercise is quite simple. · Slowly point your foot forward, feeling the muscles on the top of your lower leg stretch, and hold this position for 5 seconds.                   · Next, pull your foot back toward you as far as possible, stretching the calf muscles, and hold that position for 5 seconds. · Repeat with the other foot. · Perform 10 repetitions every hour while awake for both ankles if possible (down and then up with the foot once is one repetition). You should feel gentle stretching of the muscles in your lower leg when doing this exercise. If you feel pain, or your range of motion is limited, don't push too hard. Only go the limit your joint and muscles will let you go. If you have increasing pain, progressively worsening leg warmth or swelling, STOP the exercise and call your doctor. MEDICATION AND   SIDE EFFECT GUIDE    The [facility] MEDICATION AND SIDE EFFECT GUIDE was provided to the PATIENT AND CARE PROVIDER. Information provided includes instruction about drug purpose and common side effects for the following medications:   · Norco        These are general instructions for a healthy lifestyle:    *   Please give a list of your current medications to your Primary Care Provider. *   Please update this list whenever your medications are discontinued, doses are changed, or new medications (including over-the-counter products) are added. *   Please carry medication information at all times in case of emergency situations. About Smoking  No smoking / No tobacco products  Avoid exposure to second hand smoke     Surgeon General's Warning:  Quitting smoking now greatly reduces serious risk to your health. Obesity, smoking, and sedentary lifestyle greatly increases your risk for illness and disease. A healthy diet, regular physical exercise & weight monitoring are important for maintaining a healthy lifestyle. Congestive Heart Failure  You may be retaining fluid if you have a history of heart failure or if you experience any of the following symptoms:  Weight gain of 3 pounds or more overnight or 5 pounds in a week, increased swelling in your hands or feet or shortness of breath while lying flat in bed.   Please call your doctor as soon as you notice any of these symptoms; do not wait until your next office visit. Recognize signs and symptoms of STROKE:  F -  Face looks uneven  A -  Arms unable to move or move evenly  S -  Speech slurred or non-existent  T -  Time-call 911 as soon as signs and symptoms begin-DO NOT go          back to bed or wait to see if you get better-TIME IS BRAIN. Warning Signs of HEART ATTACK   Call 911 if you have these symptoms:     Chest discomfort. Most heart attacks involve discomfort in the center of the chest that lasts more than a few minutes, or that goes away and comes back. It can feel like uncomfortable pressure, squeezing, fullness, or pain.  Discomfort in other areas of the upper body. Symptoms can include pain or discomfort in one or both arms, the back, neck, jaw, or stomach.  Shortness of breath with or without chest discomfort.  Other signs may include breaking out in a cold sweat, nausea, or lightheadedness. Don't wait more than five minutes to call 911 - MINUTES MATTER! Fast action can save your life. Calling 911 is almost always the fastest way to get lifesaving treatment. Emergency Medical Services staff can begin treatment when they arrive -- up to an hour sooner than if someone gets to the hospital by car. The discharge information has been reviewed with the patient and caregiver. Any questions and concerns from the patient and caregiver have been addressed. The patient and caregiver verbalized understanding. Other information in your discharge envelope:  [x]     PRESCRIPTIONS  []     PHYSICAL THERAPY PRESCRIPTION  []     APPOINTMENT CARDS  []     Regional Anesthesia Pamphlet for block or block with On-Q Catheter from   Anesthesia Service  []     Medical device information sheets/pamphlets from their    []     School/work excuse note. []     /parent work excuse note.         The following personal items collected during your admission are returned to you: Dental Appliance: Dental Appliances: None  Vision: Visual Aid: None  Hearing Aid:    Jewelry: Jewelry: None  Clothing: Clothing: Pants, Undergarments, Socks, Shirt, Footwear  Other Valuables:  Other Valuables: None  Valuables sent to safe:

## 2019-12-13 NOTE — OP NOTES
[address] [address]    Name: Judit Tracy  : 1929    Left Breast Lumpectomy with [place] Node Biopsy   Operative Report      Date of Surgery: 2019  Preoperative Diagnosis:  Left breast cancer, UOQ periareolar  Postoperative Diagnosis: same   Surgeon: Dr Olivia Rendon  Anesthesia: MAC  Procedure: Left breast lumpectomy with sentinel lymph node biopsy  Indication: LEFT breast cancer           Procedure Note:  Judit Tracy was brought into the operating room and placed supine on the table. She was sedated with IV sedation. 5cc of 1/2 strength methylene blue dye was injected in the LEFT subareolar space and the breast was massage for 5 minutes. The LEFT breast and axilla were then prepped and draped in the usual sterile fashion. 0.5% marcaine was used for local anesthesia. A 3cm incision was made in the lower axilla and the deep axillary cavity was accessed through an incision in the clavipectoral fascia. 1 blue lymph node was removed and sent to pathology. No other enlarged lymph nodes were palpated. The breast tumor was in the UOQ/periareolar. The mass and clip were identified with ultrasound. A 3cm horizontal incision was made. Wide excision of the mass was performed using sharp dissection and electrocautery. The specimen was marked with sutures for orientation purposes and sent to pathology. Extra margins were obtained including the anterior and posterior margins. The margins were oriented with sutures and sent to pathology. Hemostasis was controlled with electrocautery. Both incisions were closed in 2 layers with 3-0 vicryl for the subcutaneous tissue and 4-0 Monocryl for the skin. The wounds were dressed with skin glue and the patient was awakened and taken to the recovery room. Sponge, needle and instrument counts were reported be correct.      Findings:  1 tiny blue sentinel node  Estimated Blood Loss:  10cc Specimens:   ID Type Source Tests Collected by Time Destination   1 : left breast axillary sentinal node  Preservative Breast  Danielle Gregory MD 12/13/2019 2590 Pathology   2 : Left Breast Lumpectomy upper outer quadrant periareolar Preservative Breast  Danielle Gregory MD 12/13/2019 0805 Pathology   3 : Left breast anterior margin Preservative Breast  Danielle Gregory MD 12/13/2019 5297 Pathology   4 : Left breast posterior margin Preservative Breast  Danielle Gregory MD 12/13/2019 0815 Pathology      Complications: none  Implants: none  Assistant: Toyin Dudley SA    Signed:  Beka Larson MD

## 2019-12-13 NOTE — ANESTHESIA POSTPROCEDURE EVALUATION
Procedure(s):  LEFT BREAST LUMPECTOMY AND LEFT BREAST SENTINEL NODE BIOPSY WIHT BLUE DYE. MAC    Anesthesia Post Evaluation      Multimodal analgesia: multimodal analgesia used between 6 hours prior to anesthesia start to PACU discharge  Patient location during evaluation: PACU  Patient participation: complete - patient participated  Level of consciousness: awake  Pain management: adequate  Airway patency: patent  Anesthetic complications: no  Cardiovascular status: acceptable  Respiratory status: acceptable  Hydration status: acceptable  Post anesthesia nausea and vomiting:  controlled      Vitals Value Taken Time   /71 12/13/2019  8:43 AM   Temp     Pulse 67 12/13/2019  8:45 AM   Resp 16 12/13/2019  8:45 AM   SpO2 96 % 12/13/2019  8:45 AM   Vitals shown include unvalidated device data.

## 2019-12-13 NOTE — ANESTHESIA PREPROCEDURE EVALUATION
Relevant Problems   No relevant active problems       Anesthetic History   No history of anesthetic complications            Review of Systems / Medical History  Patient summary reviewed, nursing notes reviewed and pertinent labs reviewed    Pulmonary  Within defined limits                 Neuro/Psych   Within defined limits           Cardiovascular    Hypertension        Dysrhythmias : atrial fibrillation    Pertinent negatives: CAD: WARFARIN D/C 12/7/2019 LAST DOSE.       GI/Hepatic/Renal                Endo/Other        Arthritis and cancer (BREAST)     Other Findings              Physical Exam    Airway  Mallampati: II  TM Distance: < 4 cm  Neck ROM: normal range of motion   Mouth opening: Normal     Cardiovascular    Rhythm: irregular  Rate: normal         Dental  No notable dental hx       Pulmonary  Breath sounds clear to auscultation               Abdominal  GI exam deferred       Other Findings            Anesthetic Plan    ASA: 3  Anesthesia type: MAC          Induction: Intravenous  Anesthetic plan and risks discussed with: Patient

## 2019-12-18 DIAGNOSIS — F41.9 ANXIETY: ICD-10-CM

## 2019-12-18 DIAGNOSIS — R52 PAIN: ICD-10-CM

## 2019-12-18 RX ORDER — CELECOXIB 200 MG/1
CAPSULE ORAL
Qty: 30 CAP | Refills: 2 | Status: SHIPPED | OUTPATIENT
Start: 2019-12-18 | End: 2020-09-22

## 2019-12-18 RX ORDER — ALPRAZOLAM 0.25 MG/1
0.25 TABLET ORAL
Qty: 30 TAB | Refills: 0 | Status: CANCELLED | OUTPATIENT
Start: 2019-12-18

## 2019-12-18 NOTE — TELEPHONE ENCOUNTER
[facility] Duron [facility]   Phone Number: [phone]             Caller's first and last name: Aiden Mi   Reason for call: Pt went to  medication and was told provider denied refill.    Callback required yes/no and why: yes, pt would like to know why refill was denied   Best contact number(s): [phone]   Details to clarify the request: n/a

## 2019-12-19 ENCOUNTER — OFFICE VISIT (OUTPATIENT)
Dept: SURGERY | Age: 84
End: 2019-12-19

## 2019-12-19 VITALS
HEIGHT: 66 IN | BODY MASS INDEX: 19.61 KG/M2 | SYSTOLIC BLOOD PRESSURE: 147 MMHG | HEART RATE: 79 BPM | WEIGHT: 122 LBS | DIASTOLIC BLOOD PRESSURE: 91 MMHG

## 2019-12-19 DIAGNOSIS — Z85.3 HX: BREAST CANCER: Primary | ICD-10-CM

## 2019-12-23 DIAGNOSIS — I10 ESSENTIAL HYPERTENSION: ICD-10-CM

## 2019-12-23 RX ORDER — AMLODIPINE BESYLATE 5 MG/1
TABLET ORAL
Qty: 90 TAB | Refills: 1 | Status: SHIPPED | OUTPATIENT
Start: 2019-12-23 | End: 2020-03-26

## 2020-02-03 DIAGNOSIS — Z79.01 ANTICOAGULATION GOAL OF INR 2 TO 3: ICD-10-CM

## 2020-02-03 DIAGNOSIS — Z51.81 ANTICOAGULATION GOAL OF INR 2 TO 3: ICD-10-CM

## 2020-02-03 DIAGNOSIS — D68.9 COAGULATION DISORDER (HCC): ICD-10-CM

## 2020-02-03 RX ORDER — WARFARIN SODIUM 5 MG/1
TABLET ORAL
Qty: 90 TAB | Refills: 1 | Status: SHIPPED | OUTPATIENT
Start: 2020-02-03 | End: 2020-08-06

## 2020-02-06 ENCOUNTER — OFFICE VISIT (OUTPATIENT)
Dept: INTERNAL MEDICINE CLINIC | Age: 85
End: 2020-02-06

## 2020-02-06 VITALS
HEIGHT: 66 IN | OXYGEN SATURATION: 98 % | WEIGHT: 129 LBS | SYSTOLIC BLOOD PRESSURE: 110 MMHG | DIASTOLIC BLOOD PRESSURE: 72 MMHG | HEART RATE: 72 BPM | BODY MASS INDEX: 20.73 KG/M2 | TEMPERATURE: 97.6 F | RESPIRATION RATE: 16 BRPM

## 2020-02-06 DIAGNOSIS — I10 ESSENTIAL HYPERTENSION: Primary | ICD-10-CM

## 2020-02-06 DIAGNOSIS — G89.29 CHRONIC PAIN OF BOTH KNEES: ICD-10-CM

## 2020-02-06 DIAGNOSIS — I48.91 ATRIAL FIBRILLATION, UNSPECIFIED TYPE (HCC): ICD-10-CM

## 2020-02-06 DIAGNOSIS — C50.412 MALIGNANT NEOPLASM OF UPPER-OUTER QUADRANT OF LEFT BREAST IN FEMALE, ESTROGEN RECEPTOR POSITIVE (HCC): ICD-10-CM

## 2020-02-06 DIAGNOSIS — Z17.0 MALIGNANT NEOPLASM OF UPPER-OUTER QUADRANT OF LEFT BREAST IN FEMALE, ESTROGEN RECEPTOR POSITIVE (HCC): ICD-10-CM

## 2020-02-06 DIAGNOSIS — M25.562 CHRONIC PAIN OF BOTH KNEES: ICD-10-CM

## 2020-02-06 DIAGNOSIS — Z79.01 CHRONIC ANTICOAGULATION: ICD-10-CM

## 2020-02-06 DIAGNOSIS — M25.561 CHRONIC PAIN OF BOTH KNEES: ICD-10-CM

## 2020-02-06 LAB
INR BLD: 2.7
PT POC: 32.9 SECONDS
VALID INTERNAL CONTROL?: YES

## 2020-02-06 NOTE — PROGRESS NOTES
Leydi Mcclendon is a 80 y.o. female who presents today for Hypertension and Irregular Heart Beat  . She has a history of   Patient Active Problem List   Diagnosis Code    HTN (hypertension) I10    IBS (irritable bowel syndrome) K58.9    Cataract H26.9    Diverticulosis K57.90    Atrial fibrillation (HCC) I48.91    Chronic cystitis N30.20    Chronic anticoagulation Z79.01    Advanced care planning/counseling discussion Z71.89    Breast cancer, left ([address] C50.912   . Today patient is here for follow-up. .     Breast cancer: Patient was diagnosed with stage I breast cancer late last year. She was not tolerating therapy and decided to proceed with a lumpectomy. Margins were clear of any involvement. Denies any pain. Anticoagulation  Patient here for followup of chronic anticoagulation. Indication: Atrial Fibrillation. Bleeding Signs/Symptoms:  None. Compliance with warfarin:  Yes  INR's are drawn regularly and have been Therapeutic. Lab Results   Component Value Date/Time    INR 2.2 (H) 10/07/2019 02:49 PM    INR 2.6 (H) 06/28/2019 02:24 PM    INR 2.2 (H) 03/25/2019 02:27 PM    INR POC 2.5 11/21/2019 03:20 PM    INR POC 2.2 08/19/2019 03:38 PM    INR POC 2.1 05/09/2019 03:08 PM    Prothrombin time 21.2 (H) 10/07/2019 02:49 PM    Prothrombin time 25.9 (H) 06/28/2019 02:24 PM    Prothrombin time 21.6 (H) 03/25/2019 02:27 PM     Hypertension-stable with current therapy  Hypertension ROS: taking medications as instructed, no medication side effects noted, no TIA's, no chest pain on exertion, no dyspnea on exertion, no swelling of ankles     reports that she quit smoking about 56 years ago. She smoked 0.25 packs per day. She has never used smokeless tobacco.    reports current alcohol use. BP Readings from Last 2 Encounters:   02/06/20 110/72   12/19/19 (!) 147/91     Patient continues to have significant bilateral knee pain. She is currently taking NSAIDs as this is been acting up.   She is not seen an orthopedist in some time but does have chronic arthritis to her knees. We suggest that she return to see Dr. Bobo GUILLEN  Review of Systems   Constitutional: Negative for chills, fever and weight loss. HENT: Negative for congestion and sore throat. Eyes: Negative for blurred vision, double vision and photophobia. Respiratory: Negative for cough and shortness of breath. Cardiovascular: Negative for chest pain, palpitations and leg swelling. Gastrointestinal: Negative for abdominal pain, constipation, diarrhea, heartburn, nausea and vomiting. Genitourinary: Negative for dysuria, frequency and urgency. Musculoskeletal: Positive for joint pain. Negative for myalgias. Skin: Negative for rash. Neurological: Negative. Negative for headaches. Endo/Heme/Allergies: Does not bruise/bleed easily. Psychiatric/Behavioral: Negative for memory loss and suicidal ideas. Visit Vitals  /72 (BP 1 Location: Left arm, BP Patient Position: Sitting)   Pulse 72   Temp 97.6 °F (36.4 °C) (Oral)   Resp 16   Ht 5' 6\" (1.676 m)   Wt 129 lb (58.5 kg)   SpO2 98%   BMI 20.82 kg/m²       Physical Exam  Constitutional:       General: She is not in acute distress. Appearance: She is well-developed. HENT:      Head: Normocephalic and atraumatic. Neck:      Musculoskeletal: Normal range of motion and neck supple. Thyroid: No thyromegaly. Cardiovascular:      Rate and Rhythm: Normal rate and regular rhythm. Heart sounds: No murmur. Pulmonary:      Effort: Pulmonary effort is normal.      Breath sounds: Normal breath sounds. No wheezing. Abdominal:      General: Bowel sounds are normal. There is no distension. Palpations: Abdomen is soft. Skin:     General: Skin is warm and dry. Neurological:      Mental Status: She is alert and oriented to person, place, and time. Cranial Nerves: No cranial nerve deficit.    Psychiatric:         Behavior: Behavior normal. Current Outpatient Medications   Medication Sig    warfarin (JANTOVEN) 5 mg tablet TAKE ONE TABLET BY MOUTH ONE TIME DAILY    GAVILAX 17 gram/dose powder FILL CAP TO 17G FILL LINE. STIR AND DISSOLVE IN ANY 4 TO 8 OUNCES OF BEVERAGE, THEN DRINK ONE TIME DAILY    amLODIPine (NORVASC) 5 mg tablet TAKE ONE TABLET BY MOUTH ONE TIME DAILY    celecoxib (CELEBREX) 200 mg capsule TAKE ONE CAPSULE BY MOUTH ONE TIME DAILY AS NEEDED    ALPRAZolam (XANAX) 0.25 mg tablet Take 1 Tab by mouth two (2) times daily as needed for Anxiety. Max Daily Amount: 0.5 mg. (Patient taking differently: Take 0.25 mg by mouth two (2) times daily as needed for Anxiety. Pt breaks tablet in half usually)    lisinopril (PRINIVIL, ZESTRIL) 10 mg tablet TAKE ONE TABLET BY MOUTH ONE TIME DAILY (Patient taking differently: Take 10 mg by mouth daily.)    bisoprolol-hydroCHLOROthiazide (ZIAC) 10-6.25 mg per tablet TAKE ONE TABLET BY MOUTH ONE TIME DAILY (Patient taking differently: Take 1 Tab by mouth daily.)    nitrofurantoin (MACRODANTIN) 50 mg capsule TAKE ONE CAPSULE BY MOUTH ONE TIME DAILY (Patient taking differently: Take 50 mg by mouth nightly.)    diclofenac (VOLTAREN) 1 % gel Apply 2 g to affected area four (4) times daily.  fluticasone propionate (FLONASE) 50 mcg/actuation nasal spray USE TWO SPRAYS IN THE AFFECTED NOSTRIL ONE TIME DAILY    polyethylene glycol (MIRALAX) 17 gram packet MIX 1 PACKET IN LIQUID AND DRINK BY MOUTH DAILY (Patient taking differently: Take 17 g by mouth nightly.)     No current facility-administered medications for this visit.          Past Medical History:   Diagnosis Date    Arrhythmia 2003    A-Fib  on Warfarin    Arthritis     hands and knees bilaterally    Breast cancer ([facility] [address] 09/2019    left breast    Diverticula, colon     Hypertension     IBS (irritable bowel syndrome)     Irregular heart beat     Psychiatric disorder     some anxiety and depression      Past Surgical History: Procedure Laterality Date    HX BREAST LUMPECTOMY Left 2019    LEFT BREAST LUMPECTOMY AND LEFT BREAST SENTINEL NODE BIOPSY WIHT BLUE DYE performed by Gisell Magaña MD at [facility] HX CATARACT REMOVAL Left     cataract    HX CATARACT REMOVAL Right 2013    cataract    HX  SECTION      X 3    HX COLONOSCOPY  8/28/15    with endoscopy day before    HX HYSTERECTOMY      partial    NEUROLOGICAL PROCEDURE UNLISTED      lumbar surgery       Social History     Tobacco Use    Smoking status: Former Smoker     Packs/day: 0.25     Last attempt to quit: 1964     Years since quittin.1    Smokeless tobacco: Never Used   Substance Use Topics    Alcohol use: Yes     Comment: glass of wine every other month, holidays      Family History   Problem Relation Age of Onset    Tuberculosis Mother         Allergies   Allergen Reactions    Pcn [Penicillins] Hives        Assessment/Plan  Diagnoses and all orders for this visit:    1. Essential hypertension - stable. -     METABOLIC PANEL, BASIC; Future    2. Atrial fibrillation, unspecified type (HCC)-INR at goal.  Repeat this in 6 weeks  -     PROTHROMBIN TIME + INR; Future  -     METABOLIC PANEL, BASIC; Future    3. Malignant neoplasm of upper-outer quadrant of left breast in female, estrogen receptor positive (HCC)-status post lumpectomy. Patient doing well    4. Chronic anticoagulation  -     PROTHROMBIN TIME + INR; Future  -     AMB POC PT/INR  -     PROTHROMBIN TIME + INR; Future    5. Chronic pain of both knees-notes that weather changes worsens this. 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 Suggest seeing orthopedist for considerations of injections            Rasta Acuna MD  2020    This note was created with the help of speech recognition software Macairo Fuentes and may contain some 'sound alike' errors.

## 2020-02-14 ENCOUNTER — HOSPITAL ENCOUNTER (INPATIENT)
Age: 85
LOS: 2 days | Discharge: HOME HEALTH | DRG: 418 | End: 2020-02-17
Attending: EMERGENCY MEDICINE | Admitting: SURGERY
Payer: MEDICARE

## 2020-02-14 ENCOUNTER — APPOINTMENT (OUTPATIENT)
Dept: ULTRASOUND IMAGING | Age: 85
DRG: 418 | End: 2020-02-14
Attending: EMERGENCY MEDICINE
Payer: MEDICARE

## 2020-02-14 ENCOUNTER — APPOINTMENT (OUTPATIENT)
Dept: CT IMAGING | Age: 85
DRG: 418 | End: 2020-02-14
Attending: EMERGENCY MEDICINE
Payer: MEDICARE

## 2020-02-14 DIAGNOSIS — K81.0 ACUTE CHOLECYSTITIS: Primary | ICD-10-CM

## 2020-02-14 PROBLEM — K81.9 CHOLECYSTITIS: Status: ACTIVE | Noted: 2020-02-14

## 2020-02-14 LAB
ALBUMIN SERPL-MCNC: 4 G/DL (ref 3.5–5)
ALBUMIN/GLOB SERPL: 1.2 {RATIO} (ref 1.1–2.2)
ALP SERPL-CCNC: 109 U/L (ref 45–117)
ALT SERPL-CCNC: 24 U/L (ref 12–78)
ANION GAP SERPL CALC-SCNC: 7 MMOL/L (ref 5–15)
AST SERPL-CCNC: 24 U/L (ref 15–37)
ATRIAL RATE: 97 BPM
BASOPHILS # BLD: 0.1 K/UL (ref 0–0.1)
BASOPHILS NFR BLD: 1 % (ref 0–1)
BILIRUB SERPL-MCNC: 1.1 MG/DL (ref 0.2–1)
BUN SERPL-MCNC: 13 MG/DL (ref 6–20)
BUN/CREAT SERPL: 21 (ref 12–20)
CALCIUM SERPL-MCNC: 8.9 MG/DL (ref 8.5–10.1)
CALCULATED R AXIS, ECG10: -29 DEGREES
CALCULATED T AXIS, ECG11: 29 DEGREES
CHLORIDE SERPL-SCNC: 103 MMOL/L (ref 97–108)
CO2 SERPL-SCNC: 27 MMOL/L (ref 21–32)
CREAT SERPL-MCNC: 0.62 MG/DL (ref 0.55–1.02)
DIAGNOSIS, 93000: NORMAL
DIFFERENTIAL METHOD BLD: ABNORMAL
EOSINOPHIL # BLD: 0 K/UL (ref 0–0.4)
EOSINOPHIL NFR BLD: 0 % (ref 0–7)
ERYTHROCYTE [DISTWIDTH] IN BLOOD BY AUTOMATED COUNT: 12.8 % (ref 11.5–14.5)
GLOBULIN SER CALC-MCNC: 3.3 G/DL (ref 2–4)
GLUCOSE SERPL-MCNC: 153 MG/DL (ref 65–100)
HCT VFR BLD AUTO: 44 % (ref 35–47)
HGB BLD-MCNC: 14.9 G/DL (ref 11.5–16)
IMM GRANULOCYTES # BLD AUTO: 0 K/UL (ref 0–0.04)
IMM GRANULOCYTES NFR BLD AUTO: 0 % (ref 0–0.5)
INR PPP: 2.1 (ref 0.9–1.1)
LIPASE SERPL-CCNC: 92 U/L (ref 73–393)
LYMPHOCYTES # BLD: 0.5 K/UL (ref 0.8–3.5)
LYMPHOCYTES NFR BLD: 8 % (ref 12–49)
MCH RBC QN AUTO: 32.8 PG (ref 26–34)
MCHC RBC AUTO-ENTMCNC: 33.9 G/DL (ref 30–36.5)
MCV RBC AUTO: 96.9 FL (ref 80–99)
MONOCYTES # BLD: 0.2 K/UL (ref 0–1)
MONOCYTES NFR BLD: 4 % (ref 5–13)
NEUTS SEG # BLD: 5.2 K/UL (ref 1.8–8)
NEUTS SEG NFR BLD: 87 % (ref 32–75)
NRBC # BLD: 0 K/UL (ref 0–0.01)
NRBC BLD-RTO: 0 PER 100 WBC
PLATELET # BLD AUTO: 149 K/UL (ref 150–400)
PMV BLD AUTO: 11.1 FL (ref 8.9–12.9)
POTASSIUM SERPL-SCNC: 3.6 MMOL/L (ref 3.5–5.1)
PROT SERPL-MCNC: 7.3 G/DL (ref 6.4–8.2)
PROTHROMBIN TIME: 20.6 SEC (ref 9–11.1)
Q-T INTERVAL, ECG07: 408 MS
QRS DURATION, ECG06: 84 MS
QTC CALCULATION (BEZET), ECG08: 467 MS
RBC # BLD AUTO: 4.54 M/UL (ref 3.8–5.2)
RBC MORPH BLD: ABNORMAL
SODIUM SERPL-SCNC: 137 MMOL/L (ref 136–145)
TROPONIN I SERPL-MCNC: <0.05 NG/ML
VENTRICULAR RATE, ECG03: 79 BPM
WBC # BLD AUTO: 6 K/UL (ref 3.6–11)

## 2020-02-14 PROCEDURE — 74011000250 HC RX REV CODE- 250: Performed by: EMERGENCY MEDICINE

## 2020-02-14 PROCEDURE — 83690 ASSAY OF LIPASE: CPT

## 2020-02-14 PROCEDURE — 74011000250 HC RX REV CODE- 250: Performed by: SURGERY

## 2020-02-14 PROCEDURE — 96374 THER/PROPH/DIAG INJ IV PUSH: CPT

## 2020-02-14 PROCEDURE — 96375 TX/PRO/DX INJ NEW DRUG ADDON: CPT

## 2020-02-14 PROCEDURE — 74177 CT ABD & PELVIS W/CONTRAST: CPT

## 2020-02-14 PROCEDURE — 99285 EMERGENCY DEPT VISIT HI MDM: CPT

## 2020-02-14 PROCEDURE — 36415 COLL VENOUS BLD VENIPUNCTURE: CPT

## 2020-02-14 PROCEDURE — 84484 ASSAY OF TROPONIN QUANT: CPT

## 2020-02-14 PROCEDURE — 93005 ELECTROCARDIOGRAM TRACING: CPT

## 2020-02-14 PROCEDURE — 94762 N-INVAS EAR/PLS OXIMTRY CONT: CPT

## 2020-02-14 PROCEDURE — 74011000258 HC RX REV CODE- 258: Performed by: EMERGENCY MEDICINE

## 2020-02-14 PROCEDURE — 99218 HC RM OBSERVATION: CPT

## 2020-02-14 PROCEDURE — 96376 TX/PRO/DX INJ SAME DRUG ADON: CPT

## 2020-02-14 PROCEDURE — 85025 COMPLETE CBC W/AUTO DIFF WBC: CPT

## 2020-02-14 PROCEDURE — 74011250637 HC RX REV CODE- 250/637: Performed by: EMERGENCY MEDICINE

## 2020-02-14 PROCEDURE — 80053 COMPREHEN METABOLIC PANEL: CPT

## 2020-02-14 PROCEDURE — 85610 PROTHROMBIN TIME: CPT

## 2020-02-14 PROCEDURE — 96365 THER/PROPH/DIAG IV INF INIT: CPT

## 2020-02-14 PROCEDURE — 74011000258 HC RX REV CODE- 258: Performed by: SURGERY

## 2020-02-14 PROCEDURE — 74011250636 HC RX REV CODE- 250/636: Performed by: SURGERY

## 2020-02-14 PROCEDURE — 74011250637 HC RX REV CODE- 250/637: Performed by: INTERNAL MEDICINE

## 2020-02-14 PROCEDURE — 76705 ECHO EXAM OF ABDOMEN: CPT

## 2020-02-14 PROCEDURE — 74011250636 HC RX REV CODE- 250/636: Performed by: EMERGENCY MEDICINE

## 2020-02-14 PROCEDURE — 74011250637 HC RX REV CODE- 250/637: Performed by: SURGERY

## 2020-02-14 PROCEDURE — 74011250636 HC RX REV CODE- 250/636

## 2020-02-14 PROCEDURE — 74011636320 HC RX REV CODE- 636/320: Performed by: RADIOLOGY

## 2020-02-14 RX ORDER — DICLOFENAC SODIUM 10 MG/G
2 GEL TOPICAL
COMMUNITY
End: 2020-06-02

## 2020-02-14 RX ORDER — ACETAMINOPHEN 325 MG/1
650 TABLET ORAL
Status: DISCONTINUED | OUTPATIENT
Start: 2020-02-14 | End: 2020-02-17 | Stop reason: HOSPADM

## 2020-02-14 RX ORDER — FLUTICASONE PROPIONATE 50 MCG
2 SPRAY, SUSPENSION (ML) NASAL
COMMUNITY
End: 2021-06-09 | Stop reason: SDUPTHER

## 2020-02-14 RX ORDER — ONDANSETRON 2 MG/ML
INJECTION INTRAMUSCULAR; INTRAVENOUS
Status: COMPLETED
Start: 2020-02-14 | End: 2020-02-14

## 2020-02-14 RX ORDER — FENTANYL CITRATE 50 UG/ML
25 INJECTION, SOLUTION INTRAMUSCULAR; INTRAVENOUS
Status: DISCONTINUED | OUTPATIENT
Start: 2020-02-14 | End: 2020-02-17

## 2020-02-14 RX ORDER — ALPRAZOLAM 0.25 MG/1
0.25 TABLET ORAL
COMMUNITY
End: 2021-07-29 | Stop reason: SDUPTHER

## 2020-02-14 RX ORDER — HYDROMORPHONE HYDROCHLORIDE 2 MG/1
1 TABLET ORAL
Status: DISCONTINUED | OUTPATIENT
Start: 2020-02-14 | End: 2020-02-17

## 2020-02-14 RX ORDER — DIPHENHYDRAMINE HYDROCHLORIDE 50 MG/ML
25 INJECTION, SOLUTION INTRAMUSCULAR; INTRAVENOUS
Status: COMPLETED | OUTPATIENT
Start: 2020-02-14 | End: 2020-02-14

## 2020-02-14 RX ORDER — SODIUM CHLORIDE, SODIUM LACTATE, POTASSIUM CHLORIDE, CALCIUM CHLORIDE 600; 310; 30; 20 MG/100ML; MG/100ML; MG/100ML; MG/100ML
75 INJECTION, SOLUTION INTRAVENOUS CONTINUOUS
Status: DISCONTINUED | OUTPATIENT
Start: 2020-02-14 | End: 2020-02-17

## 2020-02-14 RX ORDER — LANOLIN ALCOHOL/MO/W.PET/CERES
1000 CREAM (GRAM) TOPICAL DAILY
COMMUNITY

## 2020-02-14 RX ORDER — SODIUM CHLORIDE 9 MG/ML
250 INJECTION, SOLUTION INTRAVENOUS AS NEEDED
Status: DISPENSED | OUTPATIENT
Start: 2020-02-14 | End: 2020-02-15

## 2020-02-14 RX ORDER — BISOPROLOL FUMARATE AND HYDROCHLOROTHIAZIDE 10; 6.25 MG/1; MG/1
1 TABLET ORAL DAILY
Status: DISCONTINUED | OUTPATIENT
Start: 2020-02-15 | End: 2020-02-14

## 2020-02-14 RX ORDER — DICLOFENAC SODIUM 10 MG/G
2 GEL TOPICAL
Status: DISCONTINUED | OUTPATIENT
Start: 2020-02-14 | End: 2020-02-15

## 2020-02-14 RX ORDER — METHOCARBAMOL 500 MG/1
500 TABLET, FILM COATED ORAL
COMMUNITY
End: 2020-09-29

## 2020-02-14 RX ORDER — AMLODIPINE BESYLATE 5 MG/1
5 TABLET ORAL DAILY
Status: DISCONTINUED | OUTPATIENT
Start: 2020-02-15 | End: 2020-02-17 | Stop reason: HOSPADM

## 2020-02-14 RX ORDER — MELATONIN
1000 DAILY
COMMUNITY

## 2020-02-14 RX ORDER — CALCIUM CARBONATE 200(500)MG
200 TABLET,CHEWABLE ORAL
Status: DISCONTINUED | OUTPATIENT
Start: 2020-02-14 | End: 2020-02-15

## 2020-02-14 RX ORDER — DIPHENHYDRAMINE HYDROCHLORIDE 50 MG/ML
12.5 INJECTION, SOLUTION INTRAMUSCULAR; INTRAVENOUS
Status: DISCONTINUED | OUTPATIENT
Start: 2020-02-14 | End: 2020-02-17

## 2020-02-14 RX ORDER — ALPRAZOLAM 0.25 MG/1
0.25 TABLET ORAL DAILY PRN
Status: DISCONTINUED | OUTPATIENT
Start: 2020-02-14 | End: 2020-02-17 | Stop reason: HOSPADM

## 2020-02-14 RX ORDER — POLYETHYLENE GLYCOL 3350 17 G/17G
17 POWDER, FOR SOLUTION ORAL
COMMUNITY
End: 2020-06-15

## 2020-02-14 RX ORDER — BISOPROLOL FUMARATE 5 MG/1
10 TABLET ORAL DAILY
Status: DISCONTINUED | OUTPATIENT
Start: 2020-02-14 | End: 2020-02-17 | Stop reason: HOSPADM

## 2020-02-14 RX ORDER — NITROFURANTOIN MACROCRYSTALS 50 MG/1
50 CAPSULE ORAL
COMMUNITY
End: 2020-02-17

## 2020-02-14 RX ORDER — ONDANSETRON 2 MG/ML
4 INJECTION INTRAMUSCULAR; INTRAVENOUS
Status: COMPLETED | OUTPATIENT
Start: 2020-02-14 | End: 2020-02-14

## 2020-02-14 RX ORDER — ONDANSETRON 2 MG/ML
4 INJECTION INTRAMUSCULAR; INTRAVENOUS
Status: DISCONTINUED | OUTPATIENT
Start: 2020-02-14 | End: 2020-02-17

## 2020-02-14 RX ORDER — METOCLOPRAMIDE HYDROCHLORIDE 5 MG/ML
10 INJECTION INTRAMUSCULAR; INTRAVENOUS
Status: COMPLETED | OUTPATIENT
Start: 2020-02-14 | End: 2020-02-14

## 2020-02-14 RX ADMIN — HYDROMORPHONE HYDROCHLORIDE 1 MG: 2 TABLET ORAL at 21:16

## 2020-02-14 RX ADMIN — IOPAMIDOL 100 ML: 755 INJECTION, SOLUTION INTRAVENOUS at 10:51

## 2020-02-14 RX ADMIN — METOCLOPRAMIDE 10 MG: 5 INJECTION, SOLUTION INTRAMUSCULAR; INTRAVENOUS at 11:13

## 2020-02-14 RX ADMIN — ONDANSETRON 4 MG: 2 INJECTION INTRAMUSCULAR; INTRAVENOUS at 09:09

## 2020-02-14 RX ADMIN — ONDANSETRON 4 MG: 2 INJECTION INTRAMUSCULAR; INTRAVENOUS at 21:17

## 2020-02-14 RX ADMIN — ONDANSETRON 4 MG: 2 INJECTION INTRAMUSCULAR; INTRAVENOUS at 16:51

## 2020-02-14 RX ADMIN — MEROPENEM 500 MG: 500 INJECTION, POWDER, FOR SOLUTION INTRAVENOUS at 15:19

## 2020-02-14 RX ADMIN — DIPHENHYDRAMINE HYDROCHLORIDE 25 MG: 50 INJECTION, SOLUTION INTRAMUSCULAR; INTRAVENOUS at 11:13

## 2020-02-14 RX ADMIN — SODIUM CHLORIDE, SODIUM LACTATE, POTASSIUM CHLORIDE, AND CALCIUM CHLORIDE 75 ML/HR: 600; 310; 30; 20 INJECTION, SOLUTION INTRAVENOUS at 15:21

## 2020-02-14 RX ADMIN — DICLOFENAC 2 G: 10 GEL TOPICAL at 21:04

## 2020-02-14 RX ADMIN — PROMETHAZINE HYDROCHLORIDE 12.5 MG: 25 INJECTION INTRAMUSCULAR; INTRAVENOUS at 12:54

## 2020-02-14 RX ADMIN — BISOPROLOL FUMARATE 10 MG: 5 TABLET ORAL at 15:19

## 2020-02-14 RX ADMIN — SODIUM CHLORIDE 1000 ML: 900 INJECTION, SOLUTION INTRAVENOUS at 11:14

## 2020-02-14 RX ADMIN — ONDANSETRON 4 MG: 2 INJECTION INTRAMUSCULAR; INTRAVENOUS at 12:51

## 2020-02-14 RX ADMIN — LIDOCAINE HYDROCHLORIDE 40 ML: 20 SOLUTION ORAL; TOPICAL at 09:09

## 2020-02-14 RX ADMIN — MEROPENEM 500 MG: 500 INJECTION, POWDER, FOR SOLUTION INTRAVENOUS at 20:20

## 2020-02-14 NOTE — CONSULTS
Consult History and Physical Exam    NAME:  Lin Acosta   :   1929   MRN:  [id number]     PCP:  Rosaline Magana MD   Referring: Suzan Fallon MD     Date/Time:  2020           Assessment/Plan:       Chronic atrial fibrillation (10/5/2015) / Chronic anticoagulation (10/19/2015): Patient has been on warfarin for nearly 20 years. Denies hx of bleeding requiring medical evaluation. No prior hx of stroke. Patient is rate controlled. INR 2.1 today. -- holding warfarin tonight for surgery in AM  -- monitor daily INR  -- consider bridging if surgery will be delayed  -- continue bisoprolol    Atrial Fibrillation CHADSVASC2 Score Stroke Risk:   80 y.o. > 76        +2    female Female +1   CHF HX: No    + 0   HTN HX: Yes    +1   Stroke/TIA/Thromboembolism No    +0   Vascular Disease HX: No    + 0   Diabetes Mellitus No    + 0   CHADSVASC 2 Score 4      Annual Stroke Risk 4.8%- moderate-high      HTN (hypertension) (10/5/2015): Has not taken meds this am.  -- continue amlodipine and bisoprolol  -- hold lisinopril for now    IBS (irritable bowel syndrome) (10/5/2015):  -- bowel regimen post op    Acute  Cholecystitis (2020) / Preop cardiac evaluation:  Symptoms of abdominal pain, nausea, along with US and CT findings seem c/w acute cholecystitis. Patient denies hx of CAD, CHF, DM, stroke, CKD. Patient is able to do home chores, drive, and shop w/o chest pain or sob. Patient is low cardiac risk based upon revised cardiac risk index for planned surgery. -- Plan for arlene per surgeon    Abnormal CT:  CT notes new lesion of uncinate process. Radiology recommends repeat imaging in 6 months. -- defer plans for additional imaging to PCP as given age, further testing may not be indicated    Thank you for consulting [facility]. Subjective:   REASON FOR CONSULT:  Atrial fibrillation     CHIEF COMPLAINT:  Abdominal pain    HISTORY OF PRESENT ILLNESS:     Ms. Adan Fuentes is a 80 y.o.  female who is admitted for acute cholecystitis. Ms. Pattie Yates today complains of abdominal pain that started overnight. Initially generalized and radiated to back and now located in the epigastric region. Pain described as waxing and waning. Associated with nausea and dry heaving. With pain came sensation of needing to have BM. No reports of diarrhea. Associated with chills when pain severe. No similar symptoms in past.  Denies pain in chest or sob. No appetite since onset of pain. Past Medical History:   Diagnosis Date    Arrhythmia     A-Fib  on Warfarin    Arthritis     hands and knees bilaterally    Breast cancer ([address] 2019    left breast    Diverticula, colon     Hypertension     IBS (irritable bowel syndrome)     Irregular heart beat     Psychiatric disorder     some anxiety and depression        Past Surgical History:   Procedure Laterality Date    HX BREAST LUMPECTOMY Left 2019    LEFT BREAST LUMPECTOMY AND LEFT BREAST SENTINEL NODE BIOPSY WIHT BLUE DYE performed by Gisell Muse MD at [facility] the [facility] HX CATARACT REMOVAL Left     cataract    HX CATARACT REMOVAL Right     cataract    HX  SECTION      X 3    HX COLONOSCOPY  8/28/15    with endoscopy day before    HX HYSTERECTOMY      partial    NEUROLOGICAL PROCEDURE UNLISTED      lumbar surgery        Social History     Tobacco Use    Smoking status: Former Smoker     Packs/day: 0.25     Last attempt to quit: 1964     Years since quittin.1    Smokeless tobacco: Never Used   Substance Use Topics    Alcohol use: Yes     Comment: glass of wine every other month, holidays        Family History   Problem Relation Age of Onset    Tuberculosis Mother         Allergies   Allergen Reactions    Pcn [Penicillins] Hives        Prior to Admission medications    Medication Sig Start Date End Date Taking?  Authorizing Provider   warfarin (JANNETHTOVEN) 5 mg tablet TAKE ONE TABLET BY MOUTH ONE TIME DAILY 2/3/20   Yves Banerjee MD   GAVILAX 17 gram/dose powder FILL CAP TO 17G FILL LINE. STIR AND DISSOLVE IN ANY 4 TO 8 OUNCES OF BEVERAGE, THEN DRINK ONE TIME DAILY 1/27/20   Yves Banerjee MD   amLODIPine (NORVASC) 5 mg tablet TAKE ONE TABLET BY MOUTH ONE TIME DAILY 12/23/19   Yves Banerjee MD   celecoxib (CELEBREX) 200 mg capsule TAKE ONE CAPSULE BY MOUTH ONE TIME DAILY AS NEEDED 12/18/19   Yves Banerjee MD   ALPRAZolam Sarita Councilman) 0.25 mg tablet Take 1 Tab by mouth two (2) times daily as needed for Anxiety. Max Daily Amount: 0.5 mg. Patient taking differently: Take 0.25 mg by mouth two (2) times daily as needed for Anxiety. Pt breaks tablet in half usually 11/21/19   Yves Banerjee MD   lisinopril (PRINIVIL, ZESTRIL) 10 mg tablet TAKE ONE TABLET BY MOUTH ONE TIME DAILY  Patient taking differently: Take 10 mg by mouth daily. 11/9/19   Yves Banerjee MD   bisoprolol-hydroCHLOROthiazide [facility]) 10-6.25 mg per tablet TAKE ONE TABLET BY MOUTH ONE TIME DAILY  Patient taking differently: Take 1 Tab by mouth daily. 9/24/19   Yves Banerjee MD   nitrofurantoin (MACRODANTIN) 50 mg capsule TAKE ONE CAPSULE BY MOUTH ONE TIME DAILY  Patient taking differently: Take 50 mg by mouth nightly. 9/9/19   Yves Banerjee MD   diclofenac (VOLTAREN) 1 % gel Apply 2 g to affected area four (4) times daily. 5/9/19   Yves Banerjee MD   fluticasone propionate (FLONASE) 50 mcg/actuation nasal spray USE TWO SPRAYS IN THE AFFECTED NOSTRIL ONE TIME DAILY 5/5/19   Yves Banerjee MD   polyethylene glycol (MIRALAX) 17 gram packet MIX 1 PACKET IN LIQUID AND DRINK BY MOUTH DAILY  Patient taking differently: Take 17 g by mouth nightly.  1/14/19   Yves Banerjee MD         Review of Systems:  (bold if positive, if negative)    Gen:  chillsEyes:  ENT:  CVS:  Pulm:  GI:  Abdominal pain, nausea,  :    MS:  arthritisSkin:  Psych:  anxietyEndo:    Hem:  Renal:    Neuro:            Objective:      VITALS:    Vital signs reviewed; most recent are:    Visit Vitals  /82   Pulse 87   Temp 97.7 °F (36.5 °C)   Resp 16   Ht 5' 6\" (1.676 m)   Wt 58.1 kg (128 lb)   SpO2 100%   BMI 20.66 kg/m²     SpO2 Readings from Last 6 Encounters:   02/14/20 100%   02/06/20 98%   12/13/19 96%   12/11/19 94%   11/21/19 98%   08/19/19 98%        No intake or output data in the 24 hours ending 02/14/20 1402         Exam:     Physical Exam:    Gen:  Well-developed, well-nourished, in no acute distress  HEENT:  Pink conjunctivae, PERRL, hearing intact to voice, moist mucous membranes  Resp:  No accessory muscle use, clear breath sounds without wheezes rales or rhonchi  Card:  3/6 CAMERON, normal S1, S2 without thrills or peripheral edema  Abd:  Soft, mild epigastric tenderness, non-distended, normoactive bowel sounds are present  Musc:  No cyanosis or clubbing  Skin:  No rashes or ulcers, skin turgor is good  Neuro:  Cranial nerves 3-12 are grossly intact,  strength is 5/5 bilaterally, dorsi / plantarflexion strength is 5/5 bilaterally, follows commands appropriately  Psych:  Alert with good insight. Oriented to person, place, and time       Labs:    Recent Labs     02/14/20  0854   WBC 6.0   HGB 14.9   HCT 44.0   *     Recent Labs     02/14/20  0854      K 3.6      CO2 27   *   BUN 13   CREA 0.62   CA 8.9   ALB 4.0   TBILI 1.1*   SGOT 24   ALT 24     No results found for: [facility]     02/14/20  1307   INR 2.1*         Reviewed prior medical records in preparation for this consult: Old medical records.     Surrogate decision maker:  daughter    Total time spent in care of this patient: [address] discussed with: Patient and Family and Dr. Shakila Mackenzie    Discussed:  Care Plan           ___________________________________________________    Attending Physician: Zeke Cassidy MD

## 2020-02-14 NOTE — ED PROVIDER NOTES
80 y.o. female with past medical history significant for IBS, diverticulosis, HTN, A-fib, L breast cancer, arthritis, anxiety, and depression, who presents via POV with chief complaint of abdominal pain. Pt complains of abdominal pain, back pain, nausea, and frequent belching, since yesterday. She reports she ate food containing soy 2 days in a row, and has a hx of soy intolerance. Pt states her abdominal pain has been constant and is mostly across her upper abdomen in the epigastric area. She has intermittent cold sweats as well. She denies vomiting and diarrhea, but has had more bowel movements than normal. She has hx of endoscopy that she thinks was at least 5 years ago. Pt is anticoagulated on warfarin, for hx of A-fib. There are no other acute medical concerns at this time. Social hx: former tobacco smoker (quit ); endorses EtOH use (1 drink per month, not used recently); denies illicit drug use. PCP: Willy Elizalde MD      Note written by Codi Huertas, as dictated by Ashley Costa MD 9:12 AM      The history is provided by the patient. No  was used.         Past Medical History:   Diagnosis Date    Arrhythmia     A-Fib  on Warfarin    Arthritis     hands and knees bilaterally    Breast cancer ([address] 2019    left breast    Diverticula, colon     Hypertension     IBS (irritable bowel syndrome)     Irregular heart beat     Psychiatric disorder     some anxiety and depression       Past Surgical History:   Procedure Laterality Date    HX BREAST LUMPECTOMY Left 2019    LEFT BREAST LUMPECTOMY AND LEFT BREAST SENTINEL NODE BIOPSY WIHT BLUE DYE performed by Windsor Runner, MD at [address] [place] [address] CATARACT REMOVAL Left     cataract    HX CATARACT REMOVAL Right     cataract    HX  SECTION      X 3    HX COLONOSCOPY  8/28/15    with endoscopy day before    HX HYSTERECTOMY      partial   [facility] lumbar surgery          Family History:   Problem Relation Age of Onset    Tuberculosis Mother        Social History     Socioeconomic History    Marital status:      Spouse name: Not on file    Number of children: Not on file    Years of education: Not on file    Highest education level: Not on file   Occupational History    Not on file   Social Needs    Financial resource strain: Not on file    Food insecurity:     Worry: Not on file     Inability: Not on file    Transportation needs:     Medical: Not on file     Non-medical: Not on file   Tobacco Use    Smoking status: Former Smoker     Packs/day: 0.25     Last attempt to quit: 1964     Years since quittin.1    Smokeless tobacco: Never Used   Substance and Sexual Activity    Alcohol use: Yes     Comment: glass of wine every other month, holidays    Drug use: Never    Sexual activity: Never   Lifestyle    Physical activity:     Days per week: Not on file     Minutes per session: Not on file    Stress: Not on file   Relationships    Social connections:     Talks on phone: Not on file     Gets together: Not on file     Attends Taoist service: Not on file     Active member of club or organization: Not on file     Attends meetings of clubs or organizations: Not on file     Relationship status: Not on file    Intimate partner violence:     Fear of current or ex partner: Not on file     Emotionally abused: Not on file     Physically abused: Not on file     Forced sexual activity: Not on file   Other Topics Concern    Not on file   Social History Narrative    Not on file         ALLERGIES: Pcn [penicillins]    Review of Systems   Constitutional: Negative for fever. Gastrointestinal: Positive for abdominal pain and nausea. Negative for diarrhea and vomiting. Musculoskeletal: Positive for back pain. All other systems reviewed and are negative.       Vitals:    20 0823   BP: 176/87   Pulse: 87   Resp: 16   Temp: 97.7 °F (36.5 °C)   SpO2: 98%   Weight: 58.1 kg (128 lb)   Height: 5' 6\" (1.676 m)            Physical Exam  Vitals signs and nursing note reviewed. Constitutional:       General: She is not in acute distress. Appearance: She is well-developed. HENT:      Head: Normocephalic and atraumatic. Eyes:      Conjunctiva/sclera: Conjunctivae normal.   Neck:      Musculoskeletal: Neck supple. Cardiovascular:      Rate and Rhythm: Normal rate. Heart sounds: Normal heart sounds. Pulmonary:      Effort: Pulmonary effort is normal. No respiratory distress. Breath sounds: Normal breath sounds. Chest:      Chest wall: No tenderness. Abdominal:      General: There is no distension. Tenderness: There is abdominal tenderness in the epigastric area. Musculoskeletal: Normal range of motion. General: No deformity. Skin:     General: Skin is warm and dry. Neurological:      Mental Status: She is alert. Cranial Nerves: No cranial nerve deficit. Psychiatric:         Behavior: Behavior normal.      Note written by Codi Lujan, as dictated by Jose Benitez MD 9:19 AM    MDM     49-year-old female presents with waves of epigastric discomfort and abdominal pain that radiate to the back. Labs are reassuring but she continues to have vomiting episodes and persistent discomfort after GI cocktail and other supportive care measures. CT abdomen pelvis shows thickening and distention of the gallbladder suggesting possible cholecystitis which is confirmed on ultrasound. Discussed with Dr. Kaylen Dee of general surgery who came to see the patient in consultation and will admit her for operative management. She is chronically anticoagulated so surgery will admit the patient for correction of INR and cholecystectomy. Procedures    ED EKG interpretation: 09:01AM  Rhythm: atrial fib; and irregular. Rate (approx.): 79;  Axis: normal; ST/T wave: normal;     Note written by Codi Lujan, as dictated by Pedro Baker MD 9:19 AM    CONSULT NOTE:  12:52 PM Pedro Baker MD spoke with Dr. Chandu Bui, Consult for Surgery. Discussed available diagnostic tests and clinical findings. Dr. Jerri Briscoe will come to see the patient.

## 2020-02-14 NOTE — ED TRIAGE NOTES
Pt arrives with the c/c of upper abdominal pain with nausea that started yesterday, pt reports pain is intermittent and comes in \"spasms\". Pt reports body chills and aching in back as well. Pt reports has IBS.

## 2020-02-14 NOTE — PROGRESS NOTES
Problem: Falls - Risk of  Goal: *Absence of Falls  Description  Document Yana Sims Fall Risk and appropriate interventions in the flowsheet.   Outcome: Progressing Towards Goal  Note: Fall Risk Interventions:  Mobility Interventions: Communicate number of staff needed for ambulation/transfer, Patient to call before getting OOB, Utilize gait belt for transfers/ambulation         Medication Interventions: Evaluate medications/consider consulting pharmacy, Patient to call before getting OOB, Teach patient to arise slowly, Utilize gait belt for transfers/ambulation    Elimination Interventions: Bed/chair exit alarm, Call light in reach, Elevated toilet seat, Patient to call for help with toileting needs, Stay With Me (per policy), Toilet paper/wipes in reach, Toileting schedule/hourly rounds              Problem: Patient Education: Go to Patient Education Activity  Goal: Patient/Family Education  Outcome: Progressing Towards Goal

## 2020-02-14 NOTE — PROGRESS NOTES
Note dictated. 89F with cholecystitis. On coumadin, INR 2.1 @ 1340. Discussed by phone with hospitalist, who has agreed to optimize patient. Will start antibiotics (Meropenem due to PCN allergy, discussed plan of ABx treatment to minimize INR elevation with pharmacist and Infectious Disease team). NPO after midnight. Will screen patient, make 2u FFP on call to OR available for possible AM case.     Lukas Freeman MD

## 2020-02-14 NOTE — ED NOTES
TRANSFER - OUT REPORT:    Verbal report given to CLIFTON Castellanos(name) on Mike Speed  being transferred to CLIFTON Collado(unit) for routine progression of care       Report consisted of patients Situation, Background, Assessment and   Recommendations(SBAR). Information from the following report(s) SBAR and Kardex was reviewed with the receiving nurse. Lines:   Peripheral IV 02/14/20 Right Antecubital (Active)   Site Assessment Clean, dry, & intact 2/14/2020  9:16 AM   Phlebitis Assessment 0 2/14/2020  9:16 AM   Infiltration Assessment 0 2/14/2020  9:16 AM   Dressing Status Clean, dry, & intact 2/14/2020  9:16 AM   Dressing Type Transparent 2/14/2020  9:16 AM   Hub Color/Line Status Pink 2/14/2020  9:16 AM        Opportunity for questions and clarification was provided.       Patient transported with:   RN

## 2020-02-14 NOTE — PROGRESS NOTES
Admission Medication Reconciliation:     Information obtained from:    Patient  RxQuery data available¹:  YES    Comments/Recommendations:   Concurrent use of celecoxib and warfarin may increase the patient's risk of bleeding. Updated PTA medication list  Verified preferred pharmacy  Reviewed patient's allergies     ¹RxQuery pharmacy benefit data reflects medications filled and processed through the patient's insurance, however   this data does NOT capture whether the medication was picked up or is currently being taken by the patient. Prior to Admission Medications   Prescriptions Last Dose Informant Taking? ALPRAZolam (XANAX) 0.25 mg tablet  Self Yes   Sig: Take 0.25 mg by mouth daily as needed for Anxiety. amLODIPine (NORVASC) 5 mg tablet 2020 at Unknown time Self Yes   Sig: TAKE ONE TABLET BY MOUTH ONE TIME DAILY   bisoprolol-hydroCHLOROthiazide (ZIAC) 10-6.25 mg per tablet 2020 at Unknown time Self Yes   Sig: TAKE ONE TABLET BY MOUTH ONE TIME DAILY   celecoxib (CELEBREX) 200 mg capsule 2020 at Unknown time Self Yes   Sig: TAKE ONE CAPSULE BY MOUTH ONE TIME DAILY AS NEEDED   cholecalciferol (VITAMIN D3) (1000 Units /25 mcg) tablet 2020 at Unknown time Self Yes   Sig: Take 1,000 Units by mouth daily. cyanocobalamin 1,000 mcg tablet 2020 at Unknown time Self Yes   Sig: Take 1,000 mcg by mouth daily. diclofenac (VOLTAREN) 1 % gel 2020 at Unknown time Self Yes   Sig: Apply 2 g to affected area nightly. Apply to shoulder   fluticasone propionate (FLONASE ALLERGY RELIEF) 50 mcg/actuation nasal spray  Self Yes   Si Sprays by Both Nostrils route daily as needed for Rhinitis or Allergies. folic acid/multivit-min/lutein (CENTRUM SILVER PO) 2020 at Unknown time Self Yes   Sig: Take 1 Tab by mouth daily.    lisinopril (PRINIVIL, ZESTRIL) 10 mg tablet 2020 at Unknown time Self Yes   Sig: TAKE ONE TABLET BY MOUTH ONE TIME DAILY   methocarbamol (ROBAXIN) 500 mg tablet Self Yes   Sig: Take 500 mg by mouth three (3) times daily as needed for Muscle Spasm(s). nitrofurantoin (MACRODANTIN) 50 mg capsule 2/13/2020 at Unknown time Self Yes   Sig: Take 50 mg by mouth nightly. polyethylene glycol (MIRALAX) 17 gram/dose powder 2/13/2020 at Unknown time Self Yes   Sig: Take 17 g by mouth nightly. warfarin (JANTOVEN) 5 mg tablet 2/13/2020 at Unknown time Self Yes   Sig: TAKE ONE TABLET BY MOUTH ONE TIME DAILY      Facility-Administered Medications: None         Please contact the main inpatient pharmacy with any questions or concerns at [phone] and we will direct you to the clinical pharmacist covering this patient's care while in-house.    Lily Delgado, BrunaD, BCPS

## 2020-02-15 PROBLEM — K81.0 ACUTE CHOLECYSTITIS: Status: ACTIVE | Noted: 2020-02-15

## 2020-02-15 LAB
ABO + RH BLD: NORMAL
ALBUMIN SERPL-MCNC: 3.8 G/DL (ref 3.5–5)
ALBUMIN/GLOB SERPL: 1.4 {RATIO} (ref 1.1–2.2)
ALP SERPL-CCNC: 100 U/L (ref 45–117)
ALT SERPL-CCNC: 19 U/L (ref 12–78)
ANION GAP SERPL CALC-SCNC: 6 MMOL/L (ref 5–15)
AST SERPL-CCNC: 20 U/L (ref 15–37)
BASOPHILS # BLD: 0 K/UL (ref 0–0.1)
BASOPHILS NFR BLD: 0 % (ref 0–1)
BILIRUB DIRECT SERPL-MCNC: 0.3 MG/DL (ref 0–0.2)
BILIRUB SERPL-MCNC: 1.5 MG/DL (ref 0.2–1)
BLOOD GROUP ANTIBODIES SERPL: NORMAL
BUN SERPL-MCNC: 11 MG/DL (ref 6–20)
BUN/CREAT SERPL: 20 (ref 12–20)
CALCIUM SERPL-MCNC: 8.4 MG/DL (ref 8.5–10.1)
CHLORIDE SERPL-SCNC: 103 MMOL/L (ref 97–108)
CO2 SERPL-SCNC: 28 MMOL/L (ref 21–32)
CREAT SERPL-MCNC: 0.56 MG/DL (ref 0.55–1.02)
DIFFERENTIAL METHOD BLD: ABNORMAL
EOSINOPHIL # BLD: 0 K/UL (ref 0–0.4)
EOSINOPHIL NFR BLD: 0 % (ref 0–7)
ERYTHROCYTE [DISTWIDTH] IN BLOOD BY AUTOMATED COUNT: 12.9 % (ref 11.5–14.5)
GLOBULIN SER CALC-MCNC: 2.7 G/DL (ref 2–4)
GLUCOSE SERPL-MCNC: 130 MG/DL (ref 65–100)
HCT VFR BLD AUTO: 40.7 % (ref 35–47)
HGB BLD-MCNC: 13.8 G/DL (ref 11.5–16)
IMM GRANULOCYTES # BLD AUTO: 0 K/UL (ref 0–0.04)
IMM GRANULOCYTES NFR BLD AUTO: 0 % (ref 0–0.5)
INR PPP: 1.8 (ref 0.9–1.1)
LYMPHOCYTES # BLD: 0.7 K/UL (ref 0.8–3.5)
LYMPHOCYTES NFR BLD: 9 % (ref 12–49)
MCH RBC QN AUTO: 32.8 PG (ref 26–34)
MCHC RBC AUTO-ENTMCNC: 33.9 G/DL (ref 30–36.5)
MCV RBC AUTO: 96.7 FL (ref 80–99)
MONOCYTES # BLD: 1.1 K/UL (ref 0–1)
MONOCYTES NFR BLD: 13 % (ref 5–13)
NEUTS SEG # BLD: 6.6 K/UL (ref 1.8–8)
NEUTS SEG NFR BLD: 78 % (ref 32–75)
NRBC # BLD: 0 K/UL (ref 0–0.01)
NRBC BLD-RTO: 0 PER 100 WBC
PLATELET # BLD AUTO: 137 K/UL (ref 150–400)
PMV BLD AUTO: 11.6 FL (ref 8.9–12.9)
POTASSIUM SERPL-SCNC: 3.6 MMOL/L (ref 3.5–5.1)
PROT SERPL-MCNC: 6.5 G/DL (ref 6.4–8.2)
PROTHROMBIN TIME: 18 SEC (ref 9–11.1)
RBC # BLD AUTO: 4.21 M/UL (ref 3.8–5.2)
SODIUM SERPL-SCNC: 137 MMOL/L (ref 136–145)
SPECIMEN EXP DATE BLD: NORMAL
WBC # BLD AUTO: 8.5 K/UL (ref 3.6–11)

## 2020-02-15 PROCEDURE — 80048 BASIC METABOLIC PNL TOTAL CA: CPT

## 2020-02-15 PROCEDURE — 85025 COMPLETE CBC W/AUTO DIFF WBC: CPT

## 2020-02-15 PROCEDURE — 65270000029 HC RM PRIVATE

## 2020-02-15 PROCEDURE — 74011000258 HC RX REV CODE- 258: Performed by: INTERNAL MEDICINE

## 2020-02-15 PROCEDURE — 74011250637 HC RX REV CODE- 250/637: Performed by: SURGERY

## 2020-02-15 PROCEDURE — 74011250636 HC RX REV CODE- 250/636: Performed by: INTERNAL MEDICINE

## 2020-02-15 PROCEDURE — 74011250636 HC RX REV CODE- 250/636: Performed by: SURGERY

## 2020-02-15 PROCEDURE — 36415 COLL VENOUS BLD VENIPUNCTURE: CPT

## 2020-02-15 PROCEDURE — 96376 TX/PRO/DX INJ SAME DRUG ADON: CPT

## 2020-02-15 PROCEDURE — 80076 HEPATIC FUNCTION PANEL: CPT

## 2020-02-15 PROCEDURE — 99218 HC RM OBSERVATION: CPT

## 2020-02-15 PROCEDURE — 74011000258 HC RX REV CODE- 258: Performed by: SURGERY

## 2020-02-15 PROCEDURE — 85610 PROTHROMBIN TIME: CPT

## 2020-02-15 PROCEDURE — 74011250637 HC RX REV CODE- 250/637: Performed by: INTERNAL MEDICINE

## 2020-02-15 PROCEDURE — 86900 BLOOD TYPING SEROLOGIC ABO: CPT

## 2020-02-15 RX ORDER — PROMETHAZINE HYDROCHLORIDE 25 MG/ML
12.5 INJECTION, SOLUTION INTRAMUSCULAR; INTRAVENOUS
Status: DISCONTINUED | OUTPATIENT
Start: 2020-02-15 | End: 2020-02-15

## 2020-02-15 RX ORDER — HYDROMORPHONE HYDROCHLORIDE 1 MG/ML
1 INJECTION, SOLUTION INTRAMUSCULAR; INTRAVENOUS; SUBCUTANEOUS
Status: DISCONTINUED | OUTPATIENT
Start: 2020-02-15 | End: 2020-02-17

## 2020-02-15 RX ADMIN — ONDANSETRON 4 MG: 2 INJECTION INTRAMUSCULAR; INTRAVENOUS at 14:39

## 2020-02-15 RX ADMIN — HYDROMORPHONE HYDROCHLORIDE 1 MG: 2 TABLET ORAL at 07:35

## 2020-02-15 RX ADMIN — ACETAMINOPHEN 650 MG: 325 TABLET ORAL at 17:47

## 2020-02-15 RX ADMIN — ONDANSETRON 4 MG: 2 INJECTION INTRAMUSCULAR; INTRAVENOUS at 01:26

## 2020-02-15 RX ADMIN — MEROPENEM 500 MG: 500 INJECTION, POWDER, FOR SOLUTION INTRAVENOUS at 01:25

## 2020-02-15 RX ADMIN — SODIUM CHLORIDE 10 MG: 900 INJECTION, SOLUTION INTRAVENOUS at 09:45

## 2020-02-15 RX ADMIN — ONDANSETRON 4 MG: 2 INJECTION INTRAMUSCULAR; INTRAVENOUS at 20:40

## 2020-02-15 RX ADMIN — MEROPENEM 500 MG: 500 INJECTION, POWDER, FOR SOLUTION INTRAVENOUS at 17:47

## 2020-02-15 RX ADMIN — ONDANSETRON 4 MG: 2 INJECTION INTRAMUSCULAR; INTRAVENOUS at 07:35

## 2020-02-15 RX ADMIN — HYDROMORPHONE HYDROCHLORIDE 1 MG: 2 TABLET ORAL at 20:40

## 2020-02-15 RX ADMIN — HYDROMORPHONE HYDROCHLORIDE 1 MG: 2 TABLET ORAL at 01:26

## 2020-02-15 RX ADMIN — BISOPROLOL FUMARATE 10 MG: 5 TABLET ORAL at 09:08

## 2020-02-15 RX ADMIN — MEROPENEM 500 MG: 500 INJECTION, POWDER, FOR SOLUTION INTRAVENOUS at 09:08

## 2020-02-15 RX ADMIN — AMLODIPINE BESYLATE 5 MG: 5 TABLET ORAL at 09:08

## 2020-02-15 RX ADMIN — HYDROMORPHONE HYDROCHLORIDE 1 MG: 2 TABLET ORAL at 14:39

## 2020-02-15 NOTE — H&P
[facility] [place] [facility]  HISTORY AND PHYSICAL    Name:  Lacey Fierro  MR#:  [id number]  :  1929  ACCOUNT #:  [de-identified]  ADMIT DATE:  2020      PRIMARY CARE PROVIDER:  Janna Cole MD    [facility] PHYSICIAN:  Farzana Brown MD    ON-CALL SURGEON FOR THE EMERGENCY DEPARTMENT:  Julia Velásquez MD    CHIEF COMPLAINT:  Surgical evaluation for cholecystitis. HISTORY OF PRESENT ILLNESS:  The patient is a pleasant 66-year-old female who reports the acute onset of 10/10 epigastric right upper quadrant abdominal pain radiating to the subcostal right abdomen and upper back. She reports that there are additional symptoms of belching and nausea as well. She has changed her diet somewhat, eating soy-containing foods for two days in a row. The nausea and the pain are also associated with cold sweats and more frequent loose bowel movements. She denies vomiting, constipation, acholic stools, coke-colored urine or itching of the skin and jaundice. She had endoscopy approximately 5 years ago. The patient has a complicated past medical history of atrial fibrillation for which she is on chronic Coumadin. Last INR on  was 2.7. INR today after she was established 2.1. White blood cell count 6.0, hemoglobin 14.9, platelets 553. She has a neutrophil count of 87, which is elevated. The basic metabolic panel is unremarkable. The total bilirubin is 1.1, which is elevated from her baseline of 0.9. Troponin is unremarkable. CT of the abdomen and pelvis without contrast revealed distended gallbladder with pericholecystic edema concerning for acute cholecystitis. There are no gallstones noted. Complex hepatic cysts. There is 9mm hypodensity in the uncinate process. Sigmoid diverticulosis. Peggyann Gang PAST MEDICAL HISTORY:  See HPI. 1.  IBS. 2.  Diverticulosis. 3.  Hypertension. 4.  Left breast cancer, status post lumpectomy probably two months ago.   5. Osteoarthritis. 6.  Anxiety and depression. PAST SURGICAL HISTORY:  Left breast lumpectomy. ALLERGIES:  PENICILLIN FOR WHICH SHE HAS HIVES. SOCIAL HISTORY:  She does not smoke. She denies illicit drug use. She quit smoking in 1964. She drinks one alcoholic drink a month. She lives independently with her daughter and son-in-law. She still drives. FAMILY HISTORY:  Tuberculosis. REVIEW OF SYSTEMS:  Positive for abdominal pain, nausea, and back pain. Negative constitutional, HEENT, cardiovascular, pulmonary, musculoskeletal, psychiatric, hematologic, neurologic, skin, and hematologic. PHYSICAL EXAMINATION:  GENERAL:  She is a well-appearing, age-appropriate female, in mild distress. VITAL SIGNS:  Temperature is 97.7, blood pressure 172/87, pulse is 87, respirations 16, she is satting 98% on room air. She is 5 feet 6 inches tall and weighs 51.1 kg. HEENT:  Normocephalic, atraumatic. NECK:  Supple. Trachea is midline. LUNGS:  Chest is clear. Mild labored breathing. HEART:  Regular. No murmurs or rubs. ABDOMEN:  Soft. She is focally tender in the right upper quadrant with a palpably distended gallbladder. There is no rebound or guarding. MUSCULOSKELETAL:  No clubbing or cyanosis. She has 1+ edema, right larger than the left lower extremity. PSYCHIATRIC:  She is appropriate to questioning and pleasant. NEUROLOGIC:  Grossly nonfocal.  SKIN:  She has  tattoo on the right hand, otherwise no evidence of rashes or lesions. She has minimal damage from sun exposure. RADIOLOGY:  See HPI. I was able to review the CT scan images personally myself. LABORATORY DATA:  See HPI. ASSESSMENT:  Acute cholecystitis by clinical history, CT scan and elevated neutrophil count. PLAN:  1.  NPO, IV fluids. Admit to observation under General Surgery Service. 2.  We will order a right upper quadrant ultrasound to evaluate the extrahepatic ductal system and common bile duct.   Hospitalist consulted for medical optimization and medical care. We will start patient on therapeutic antibiotic of meropenem 0.5 q. 6 hours. The patient will unfortunately have some perturbation with Levaquin and Flagyl, so we will attempt to use this in order to minimize that effect. 3.  Recheck a.m. INR. I discussed the plan of care with her son and the patient, who was at bedside. Risk of surgery included but not limited to risk of bleeding, infection, risk of damage to structures around the gallbladder including the common bile duct, liver, duodenum, stomach, and colon. There is also a postoperative risk for development of hematoma, seroma, biloma which may be managed percutaneously, endoscopically, or surgically. When her INR is less than 1.8, the plan is to proceed with laparoscopic cholecystectomy. As long as the ultrasound does not reveal extensive common bile duct dilatation or intrahepatic ductal dilatation, she will not need cholangiogram.  I will screen the patient and order fresh frozen plasma on call for the OR possibly for tomorrow. This plan was discussed with the patient and her son at the bedside who agree. It was a pleasure to participate in her care.       Collin Hilliard MD      [id number]  D:  02/14/2020 14:05  T:  02/14/2020 18:52  JOB #:  [id number]  CC:  MD Kelley Bailey MD Mickiel Slater, MD

## 2020-02-15 NOTE — PROGRESS NOTES
[facility]    Medical Progress Note      NAME: Trever Pressley   :  1929  MRM:  [id number]    Date/Time of service 2/15/2020  7:36 AM          Assessment and Plan:     Acute  Cholecystitis / IBS (irritable bowel syndrome) - Awaiting surgery. NPO.  IVF. Prn IV fentanyl and dilaudid. Empiric meropenum. Surgery will be attending to pain control, DVT prophylaxis and rehab decisions as usual.    Chronic atrial fibrillation / Chronic anticoagulation - No prior hx of stroke nor bleeding for 20yr. Patient is rate controlled. INR 1.8 today after holding warfarin. No significant risk to reversal, could give vitamin K or FFP for surgery. Resume coumadin when stable post op. Continue bisoprolol     HTN (hypertension) - Stable on amlodipine and bisoprolol. Hold lisinopril for now          Subjective:     Chief Complaint:  abd pain better    ROS:  (bold if positive, if negative)    Tolerating PT  NPO        Objective:     Last 24hrs VS reviewed since prior progress note.  Most recent are:    Visit Vitals  /79 (BP 1 Location: Left arm, BP Patient Position: At rest)   Pulse 80   Temp 98 °F (36.7 °C)   Resp 16   Ht 5' 6\" (1.676 m)   Wt 58.1 kg (128 lb)   SpO2 94%   BMI 20.66 kg/m²     SpO2 Readings from Last 6 Encounters:   02/15/20 94%   20 98%   19 96%   19 94%   19 98%   19 98%            Intake/Output Summary (Last 24 hours) at 2/15/2020 0736  Last data filed at 2/15/2020 0355  Gross per 24 hour   Intake 1292.5 ml   Output    Net 1292.5 ml        Physical Exam:    Gen:  Thin, frail, in no acute distress  HEENT:  Pink conjunctivae, PERRL, hearing intact to voice, moist mucous membranes  Neck:  Supple, without masses, thyroid non-tender  Resp:  No accessory muscle use, clear breath sounds without wheezes rales or rhonchi  Card:  No murmurs, normal S1, S2 without thrills, bruits or peripheral edema  Abd:  Soft, non-tender, non-distended, normoactive bowel sounds are present, no mass  Lymph:  No cervical or inguinal adenopathy  Musc:  No cyanosis or clubbing  Skin:  No rashes or ulcers, skin turgor is good  Neuro:  Cranial nerves are grossly intact, no focal motor weakness, follows commands   Psych:  Good insight, oriented to person, place and time, alert    Telemetry reviewed:   normal sinus rhythm  __________________________________________________________________  Medications Reviewed: (see below)  Medications:     Current Facility-Administered Medications   Medication Dose Route Frequency    fentaNYL citrate (PF) injection 25 mcg  25 mcg IntraVENous Q1H PRN    lactated Ringers infusion  75 mL/hr IntraVENous CONTINUOUS    HYDROmorphone (DILAUDID) tablet 1 mg  1 mg Oral Q4H PRN    acetaminophen (TYLENOL) tablet 650 mg  650 mg Oral Q4H PRN    meropenem (MERREM) 500 mg in 0.9% sodium chloride (MBP/ADV) 50 mL  0.5 g IntraVENous Q6H    ALPRAZolam (XANAX) tablet 0.25 mg  0.25 mg Oral DAILY PRN    diclofenac (VOLTAREN) 1 % topical gel 2 g  2 g Topical QHS    diphenhydrAMINE (BENADRYL) injection 12.5 mg  12.5 mg IntraVENous Q4H PRN    calcium carbonate (TUMS) chewable tablet 200 mg [elemental]  200 mg Oral Q6H PRN    ondansetron (ZOFRAN) injection 4 mg  4 mg IntraVENous Q4H PRN    fentaNYL citrate (PF) injection 25 mcg  25 mcg IntraVENous Q4H PRN    bisoprolol (ZEBETA) tablet 10 mg  10 mg Oral DAILY    amLODIPine (NORVASC) tablet 5 mg  5 mg Oral DAILY        Lab Data Reviewed: (see below)  Lab Review:     Recent Labs     02/15/20  0130 02/14/20  0854   WBC 8.5 6.0   HGB 13.8 14.9   HCT 40.7 44.0   * 149*     Recent Labs     02/15/20  0130 02/14/20  1307 02/14/20  0854     --  137   K 3.6  --  3.6     --  103   CO2 28  --  27   *  --  153*   BUN 11  --  13   CREA 0.56  --  0.62   CA 8.4*  --  8.9   ALB 3.8  --  4.0   TBILI 1.5*  --  1.1*   SGOT 20  --  24   ALT 19  --  24   INR 1.8* 2.1*  --      No results found for: GLUCPOC  No results for input(s): PH, PCO2, PO2, HCO3, FIO2 in the last 72 hours. Recent Labs     02/15/20  0130 02/14/20  1307   INR 1.8* 2.1*     All Micro Results     None          Other pertinent lab: none    Total time spent with patient: 45 Minutes I reviewed chart, notes, data and current medications in the medical record. I have examined and treated the patient at bedside during this period.                  Care Plan discussed with: Patient, Nursing Staff and >50% of time spent in counseling and coordination of care    Discussed:  Care Plan    Prophylaxis:  Coumadin and H2B/PPI    Disposition:  Home w/Family           ___________________________________________________    Attending Physician: Soumya Garcia MD

## 2020-02-15 NOTE — PROGRESS NOTES
Progress Note    Patient: Kathy Neely MRN: [id number]  SSN: [id number]    YOB: 1929  Age: 80 y.o. Sex: female      Admit Date: 2020    * No surgery found *    Procedure:      Subjective:     Patient has complaints of nausea. Objective:     Visit Vitals  /87   Pulse 79   Temp 97.9 °F (36.6 °C)   Resp 16   Ht 5' 6\" (1.676 m)   Wt 58.1 kg (128 lb)   SpO2 97%   BMI 20.66 kg/m²       Temp (24hrs), Av °F (36.7 °C), Min:97.5 °F (36.4 °C), Max:98.3 °F (36.8 °C)      Physical Exam:    ABDOMEN: soft, upper abd TTP    Data Review: images and reports reviewed    Lab Review: All lab results for the last 24 hours reviewed. Assessment:     Hospital Problems  Date Reviewed: 2020          Codes Class Noted POA    Acute cholecystitis ICD-10-CM: K81.0  ICD-9-CM: 575.0  2/15/2020 Unknown        * (Principal) Cholecystitis ICD-10-CM: K81.9  ICD-9-CM: 575.10  2020 Yes        Chronic anticoagulation ICD-10-CM: Z79.01  ICD-9-CM: V58.61  10/19/2015 Yes        HTN (hypertension) ICD-10-CM: I10  ICD-9-CM: 401.9  10/5/2015 Yes        IBS (irritable bowel syndrome) ICD-10-CM: K58.9  ICD-9-CM: 564.1  10/5/2015 Yes        Chronic atrial fibrillation ICD-10-CM: I48.20  ICD-9-CM: 427.31  10/5/2015 Yes              Plan/Recommendations/Medical Decision Making:     INR 1.8. Will give some vitamin K and plan for lap arlene tomorrow.

## 2020-02-15 NOTE — PROGRESS NOTES
[facility] Dosing Services: 2/15/2020    Pharmacist Renal Dosing Progress Note   Physician: Virginia Martel MD    Meropenem was automatically dose-adjusted per [facility] P&T Committee Protocol, with respect to renal function. Consult provided for this   80 y.o. , female , for empiric coverage for Acute Cholecystitis. Pt Weight:   Wt Readings from Last 1 Encounters:   02/14/20 58.1 kg (128 lb)         Previous Regimen Meropenem 500 mg IV Q 6 hrs   Serum Creatinine Lab Results   Component Value Date/Time    Creatinine 0.56 02/15/2020 01:30 AM       Creatinine Clearance Estimated Creatinine Clearance: 48 mL/min (by C-G formula based on SCr of 0.56 mg/dL). BUN Lab Results   Component Value Date/Time    BUN 11 02/15/2020 01:30 AM         PLAN  Dosage changed to:  Meropenem 500 mg IV Q 8 hrs (for CrCl 25-49 mL/min). Pharmacy to continue to monitor patient daily. Will make dosage adjustments based upon changing renal function.   Signed Eduard Aguirre 53 information:  [phone]

## 2020-02-15 NOTE — PROGRESS NOTES
2/15/2020  2:47 PM  Reason for Admission: Emergency - cholecystitis which may require surgical intervention. RUR: 11%    Advance Directive: On file. POA, AD. Assessment:    Age: 80    Sex: [] Male [x]Female     Residency: [x]Private residence []Apartment []Assisted Living []LTC []Other:   Exterior Steps:  Interior Steps:    Lives With: []With spouse [x]Other family members (DTR and TAMERA) []Underage children []Alone []Care provider []Other:    Prior functioning:  [x]Independent []Dependent []Partial dependence   Pt requires assistance with: []Bathing []Dressing []Toileting []Ambulation     Prior DME required:  []None []RW [x]Cane []Crutches []Bedside commode   []Shower Chair []Wheelchair []Hospital Bed []Deana []Stair lift []Rollator []Other:    DME available: []None []RW [x]Cane []Crutches []Bedside commode   []Shower Chair []Wheelchair []Hospital Bed []Deana []Stair lift []Rollator []Other:    Rehab history: [x]None []Outpatient PT []Home Health (Provider/Date: ) []SNF (Provider/Date: ) []IPR (Provider/Date: ) []LTC (Provider/Date: )    Discharge Concerns: []Yes [x]No []Unknown   Describe: Insurer:  Medicare / [employer] [facility]: [address] [place], [place] [address] Transport: Family        Transition of care plan:    []Unable to determine at this time. Awaiting clinical progress, and disposition recommendations. [x] Home with outpatient follow-up    [] Home with Outpatient PT and outpatient follow-up   Pt aware of OP appt?  []Yes, Provider:   []Not scheduled   Transport provider:     [] Home with family assistance as needed and outpatient follow-up   Family able to assist:    Schedule:  Transport provider:      [] Home with Home Health   - Provider:     []SNF/IPR   -[]Preferences given:   []Listing provided and preferences requested   -Status: []Pending []Accepted:    -Auth required: []Yes []No    -Auth initiated date:   -3 midnight stay required: []Yes []No  Date satisfied:     [] Other:     Shashi Cinnamon, MA    Care Management Interventions  PCP Verified by CM: Yes  Mode of Transport at Discharge:  Other (see comment)(Family)  MyChart Signup: No  Discharge Durable Medical Equipment: No  Physical Therapy Consult: No  Occupational Therapy Consult: No  Speech Therapy Consult: No  Current Support Network: Relative's Home  Confirm Follow Up Transport: Family  Discharge Location  Discharge Placement: Home with family assistance

## 2020-02-15 NOTE — PROGRESS NOTES
Problem: Falls - Risk of  Goal: *Absence of Falls  Description  Document Tia Manus Fall Risk and appropriate interventions in the flowsheet.   Outcome: Progressing Towards Goal  Note: Fall Risk Interventions:  Mobility Interventions: Bed/chair exit alarm, Communicate number of staff needed for ambulation/transfer, Patient to call before getting OOB         Medication Interventions: Bed/chair exit alarm, Evaluate medications/consider consulting pharmacy, Patient to call before getting OOB, Teach patient to arise slowly    Elimination Interventions: Bed/chair exit alarm, Call light in reach, Elevated toilet seat, Patient to call for help with toileting needs, Stay With Me (per policy), Toileting schedule/hourly rounds, Toilet paper/wipes in reach              Problem: Patient Education: Go to Patient Education Activity  Goal: Patient/Family Education  Outcome: Progressing Towards Goal

## 2020-02-16 ENCOUNTER — ANESTHESIA EVENT (OUTPATIENT)
Dept: SURGERY | Age: 85
DRG: 418 | End: 2020-02-16
Payer: MEDICARE

## 2020-02-16 ENCOUNTER — ANESTHESIA (OUTPATIENT)
Dept: SURGERY | Age: 85
DRG: 418 | End: 2020-02-16
Payer: MEDICARE

## 2020-02-16 LAB
ALBUMIN SERPL-MCNC: 3.2 G/DL (ref 3.5–5)
ALBUMIN/GLOB SERPL: 1 {RATIO} (ref 1.1–2.2)
ALP SERPL-CCNC: 96 U/L (ref 45–117)
ALT SERPL-CCNC: 23 U/L (ref 12–78)
ANION GAP SERPL CALC-SCNC: 9 MMOL/L (ref 5–15)
AST SERPL-CCNC: 34 U/L (ref 15–37)
BILIRUB SERPL-MCNC: 1.7 MG/DL (ref 0.2–1)
BUN SERPL-MCNC: 14 MG/DL (ref 6–20)
BUN/CREAT SERPL: 31 (ref 12–20)
CALCIUM SERPL-MCNC: 8.5 MG/DL (ref 8.5–10.1)
CHLORIDE SERPL-SCNC: 99 MMOL/L (ref 97–108)
CO2 SERPL-SCNC: 27 MMOL/L (ref 21–32)
CREAT SERPL-MCNC: 0.45 MG/DL (ref 0.55–1.02)
ERYTHROCYTE [DISTWIDTH] IN BLOOD BY AUTOMATED COUNT: 12.5 % (ref 11.5–14.5)
GLOBULIN SER CALC-MCNC: 3.2 G/DL (ref 2–4)
GLUCOSE SERPL-MCNC: 122 MG/DL (ref 65–100)
HCT VFR BLD AUTO: 41.7 % (ref 35–47)
HGB BLD-MCNC: 14.2 G/DL (ref 11.5–16)
INR PPP: 1.2 (ref 0.9–1.1)
MAGNESIUM SERPL-MCNC: 1.9 MG/DL (ref 1.6–2.4)
MCH RBC QN AUTO: 33.1 PG (ref 26–34)
MCHC RBC AUTO-ENTMCNC: 34.1 G/DL (ref 30–36.5)
MCV RBC AUTO: 97.2 FL (ref 80–99)
NRBC # BLD: 0 K/UL (ref 0–0.01)
NRBC BLD-RTO: 0 PER 100 WBC
PHOSPHATE SERPL-MCNC: 3 MG/DL (ref 2.6–4.7)
PLATELET # BLD AUTO: 124 K/UL (ref 150–400)
PMV BLD AUTO: 11.4 FL (ref 8.9–12.9)
POTASSIUM SERPL-SCNC: 3.4 MMOL/L (ref 3.5–5.1)
PROT SERPL-MCNC: 6.4 G/DL (ref 6.4–8.2)
PROTHROMBIN TIME: 12 SEC (ref 9–11.1)
RBC # BLD AUTO: 4.29 M/UL (ref 3.8–5.2)
SODIUM SERPL-SCNC: 135 MMOL/L (ref 136–145)
WBC # BLD AUTO: 14.7 K/UL (ref 3.6–11)

## 2020-02-16 PROCEDURE — 74011250636 HC RX REV CODE- 250/636: Performed by: ANESTHESIOLOGY

## 2020-02-16 PROCEDURE — 74011250636 HC RX REV CODE- 250/636: Performed by: SURGERY

## 2020-02-16 PROCEDURE — 77030012799 HC TRCR GELPRT BLN AMR -B: Performed by: SURGERY

## 2020-02-16 PROCEDURE — 74011250636 HC RX REV CODE- 250/636: Performed by: NURSE ANESTHETIST, CERTIFIED REGISTERED

## 2020-02-16 PROCEDURE — 77030007955 HC PCH ENDOSC SPEC J&J -B: Performed by: SURGERY

## 2020-02-16 PROCEDURE — 74011000258 HC RX REV CODE- 258: Performed by: SURGERY

## 2020-02-16 PROCEDURE — 76010000149 HC OR TIME 1 TO 1.5 HR: Performed by: SURGERY

## 2020-02-16 PROCEDURE — 85610 PROTHROMBIN TIME: CPT

## 2020-02-16 PROCEDURE — 0FT44ZZ RESECTION OF GALLBLADDER, PERCUTANEOUS ENDOSCOPIC APPROACH: ICD-10-PCS | Performed by: SURGERY

## 2020-02-16 PROCEDURE — 76210000006 HC OR PH I REC 0.5 TO 1 HR: Performed by: SURGERY

## 2020-02-16 PROCEDURE — 77030003578 HC NDL INSUF VERES AMR -B: Performed by: SURGERY

## 2020-02-16 PROCEDURE — 88304 TISSUE EXAM BY PATHOLOGIST: CPT

## 2020-02-16 PROCEDURE — 85027 COMPLETE CBC AUTOMATED: CPT

## 2020-02-16 PROCEDURE — 77030004813 HC CATH CHOLGM TELE -B: Performed by: SURGERY

## 2020-02-16 PROCEDURE — 77030040506 HC DRN WND MDII -A: Performed by: SURGERY

## 2020-02-16 PROCEDURE — 74011000250 HC RX REV CODE- 250: Performed by: NURSE ANESTHETIST, CERTIFIED REGISTERED

## 2020-02-16 PROCEDURE — 77030010513 HC APPL CLP LIG J&J -C: Performed by: SURGERY

## 2020-02-16 PROCEDURE — 74011250637 HC RX REV CODE- 250/637: Performed by: INTERNAL MEDICINE

## 2020-02-16 PROCEDURE — 77030009403 HC ELECTRD ENDO MEGA -B: Performed by: SURGERY

## 2020-02-16 PROCEDURE — 77030018813 HC SCIS LAPSCP EPIX DISP AMR -B: Performed by: SURGERY

## 2020-02-16 PROCEDURE — 77030010507 HC ADH SKN DERMBND J&J -B: Performed by: SURGERY

## 2020-02-16 PROCEDURE — 77030020263 HC SOL INJ SOD CL0.9% LFCR 1000ML: Performed by: SURGERY

## 2020-02-16 PROCEDURE — 77030008756 HC TU IRR SUC STRY -B: Performed by: SURGERY

## 2020-02-16 PROCEDURE — 77030018836 HC SOL IRR NACL ICUM -A: Performed by: SURGERY

## 2020-02-16 PROCEDURE — 36415 COLL VENOUS BLD VENIPUNCTURE: CPT

## 2020-02-16 PROCEDURE — 77030020829: Performed by: SURGERY

## 2020-02-16 PROCEDURE — 77030008517 HC TBNG INSUF ENDO STOR -B: Performed by: SURGERY

## 2020-02-16 PROCEDURE — 65270000029 HC RM PRIVATE

## 2020-02-16 PROCEDURE — 77030011640 HC PAD GRND REM COVD -A: Performed by: SURGERY

## 2020-02-16 PROCEDURE — 84100 ASSAY OF PHOSPHORUS: CPT

## 2020-02-16 PROCEDURE — 83735 ASSAY OF MAGNESIUM: CPT

## 2020-02-16 PROCEDURE — 77030012770 HC TRCR OPT FX AMR -B: Performed by: SURGERY

## 2020-02-16 PROCEDURE — 77030026438 HC STYL ET INTUB CARD -A: Performed by: ANESTHESIOLOGY

## 2020-02-16 PROCEDURE — 80053 COMPREHEN METABOLIC PANEL: CPT

## 2020-02-16 PROCEDURE — 77030008684 HC TU ET CUF COVD -B: Performed by: ANESTHESIOLOGY

## 2020-02-16 PROCEDURE — 76060000033 HC ANESTHESIA 1 TO 1.5 HR: Performed by: SURGERY

## 2020-02-16 PROCEDURE — 77030002933 HC SUT MCRYL J&J -A: Performed by: SURGERY

## 2020-02-16 PROCEDURE — 74011250637 HC RX REV CODE- 250/637: Performed by: SURGERY

## 2020-02-16 PROCEDURE — 77030031139 HC SUT VCRL2 J&J -A: Performed by: SURGERY

## 2020-02-16 RX ORDER — LIDOCAINE HYDROCHLORIDE 10 MG/ML
0.1 INJECTION, SOLUTION EPIDURAL; INFILTRATION; INTRACAUDAL; PERINEURAL AS NEEDED
Status: DISCONTINUED | OUTPATIENT
Start: 2020-02-16 | End: 2020-02-16 | Stop reason: HOSPADM

## 2020-02-16 RX ORDER — DEXAMETHASONE SODIUM PHOSPHATE 4 MG/ML
INJECTION, SOLUTION INTRA-ARTICULAR; INTRALESIONAL; INTRAMUSCULAR; INTRAVENOUS; SOFT TISSUE AS NEEDED
Status: DISCONTINUED | OUTPATIENT
Start: 2020-02-16 | End: 2020-02-16 | Stop reason: HOSPADM

## 2020-02-16 RX ORDER — ROCURONIUM BROMIDE 10 MG/ML
INJECTION, SOLUTION INTRAVENOUS AS NEEDED
Status: DISCONTINUED | OUTPATIENT
Start: 2020-02-16 | End: 2020-02-16 | Stop reason: HOSPADM

## 2020-02-16 RX ORDER — NEOSTIGMINE METHYLSULFATE 1 MG/ML
INJECTION INTRAVENOUS AS NEEDED
Status: DISCONTINUED | OUTPATIENT
Start: 2020-02-16 | End: 2020-02-16 | Stop reason: HOSPADM

## 2020-02-16 RX ORDER — SODIUM CHLORIDE, SODIUM LACTATE, POTASSIUM CHLORIDE, CALCIUM CHLORIDE 600; 310; 30; 20 MG/100ML; MG/100ML; MG/100ML; MG/100ML
100 INJECTION, SOLUTION INTRAVENOUS CONTINUOUS
Status: DISCONTINUED | OUTPATIENT
Start: 2020-02-16 | End: 2020-02-16 | Stop reason: HOSPADM

## 2020-02-16 RX ORDER — SUCCINYLCHOLINE CHLORIDE 20 MG/ML
INJECTION INTRAMUSCULAR; INTRAVENOUS AS NEEDED
Status: DISCONTINUED | OUTPATIENT
Start: 2020-02-16 | End: 2020-02-16 | Stop reason: HOSPADM

## 2020-02-16 RX ORDER — GLYCOPYRROLATE 0.2 MG/ML
INJECTION INTRAMUSCULAR; INTRAVENOUS AS NEEDED
Status: DISCONTINUED | OUTPATIENT
Start: 2020-02-16 | End: 2020-02-16 | Stop reason: HOSPADM

## 2020-02-16 RX ORDER — FENTANYL CITRATE 50 UG/ML
INJECTION, SOLUTION INTRAMUSCULAR; INTRAVENOUS AS NEEDED
Status: DISCONTINUED | OUTPATIENT
Start: 2020-02-16 | End: 2020-02-16 | Stop reason: HOSPADM

## 2020-02-16 RX ORDER — HYDROMORPHONE HYDROCHLORIDE 1 MG/ML
.25-1 INJECTION, SOLUTION INTRAMUSCULAR; INTRAVENOUS; SUBCUTANEOUS
Status: DISCONTINUED | OUTPATIENT
Start: 2020-02-16 | End: 2020-02-16 | Stop reason: HOSPADM

## 2020-02-16 RX ORDER — ONDANSETRON 2 MG/ML
INJECTION INTRAMUSCULAR; INTRAVENOUS AS NEEDED
Status: DISCONTINUED | OUTPATIENT
Start: 2020-02-16 | End: 2020-02-16 | Stop reason: HOSPADM

## 2020-02-16 RX ORDER — CEFAZOLIN SODIUM 1 G/3ML
INJECTION, POWDER, FOR SOLUTION INTRAMUSCULAR; INTRAVENOUS AS NEEDED
Status: DISCONTINUED | OUTPATIENT
Start: 2020-02-16 | End: 2020-02-16 | Stop reason: HOSPADM

## 2020-02-16 RX ORDER — PROPOFOL 10 MG/ML
INJECTION, EMULSION INTRAVENOUS AS NEEDED
Status: DISCONTINUED | OUTPATIENT
Start: 2020-02-16 | End: 2020-02-16 | Stop reason: HOSPADM

## 2020-02-16 RX ORDER — PHENYLEPHRINE HCL IN 0.9% NACL 0.4MG/10ML
SYRINGE (ML) INTRAVENOUS AS NEEDED
Status: DISCONTINUED | OUTPATIENT
Start: 2020-02-16 | End: 2020-02-16 | Stop reason: HOSPADM

## 2020-02-16 RX ORDER — LIDOCAINE HYDROCHLORIDE 20 MG/ML
INJECTION, SOLUTION EPIDURAL; INFILTRATION; INTRACAUDAL; PERINEURAL AS NEEDED
Status: DISCONTINUED | OUTPATIENT
Start: 2020-02-16 | End: 2020-02-16 | Stop reason: HOSPADM

## 2020-02-16 RX ADMIN — NEOSTIGMINE METHYLSULFATE 1 MG: 1 INJECTION, SOLUTION INTRAVENOUS at 12:50

## 2020-02-16 RX ADMIN — AMLODIPINE BESYLATE 5 MG: 5 TABLET ORAL at 09:52

## 2020-02-16 RX ADMIN — DEXAMETHASONE SODIUM PHOSPHATE 4 MG: 4 INJECTION, SOLUTION INTRAMUSCULAR; INTRAVENOUS at 12:21

## 2020-02-16 RX ADMIN — ONDANSETRON 4 MG: 2 INJECTION INTRAMUSCULAR; INTRAVENOUS at 12:21

## 2020-02-16 RX ADMIN — Medication 100 MCG: at 12:19

## 2020-02-16 RX ADMIN — MEROPENEM 500 MG: 500 INJECTION, POWDER, FOR SOLUTION INTRAVENOUS at 17:09

## 2020-02-16 RX ADMIN — MEROPENEM 500 MG: 500 INJECTION, POWDER, FOR SOLUTION INTRAVENOUS at 09:52

## 2020-02-16 RX ADMIN — FENTANYL CITRATE 50 MCG: 50 INJECTION, SOLUTION INTRAMUSCULAR; INTRAVENOUS at 12:14

## 2020-02-16 RX ADMIN — ROCURONIUM BROMIDE 10 MG: 50 INJECTION, SOLUTION INTRAVENOUS at 12:15

## 2020-02-16 RX ADMIN — MEROPENEM 500 MG: 500 INJECTION, POWDER, FOR SOLUTION INTRAVENOUS at 01:00

## 2020-02-16 RX ADMIN — PHENYLEPHRINE HYDROCHLORIDE 40 MCG/MIN: 10 INJECTION INTRAVENOUS at 12:30

## 2020-02-16 RX ADMIN — LIDOCAINE HYDROCHLORIDE 80 MG: 20 INJECTION, SOLUTION INTRAVENOUS at 12:15

## 2020-02-16 RX ADMIN — FENTANYL CITRATE 50 MCG: 50 INJECTION, SOLUTION INTRAMUSCULAR; INTRAVENOUS at 12:37

## 2020-02-16 RX ADMIN — HYDROMORPHONE HYDROCHLORIDE 1 MG: 1 INJECTION, SOLUTION INTRAMUSCULAR; INTRAVENOUS; SUBCUTANEOUS at 01:40

## 2020-02-16 RX ADMIN — ACETAMINOPHEN 650 MG: 325 TABLET ORAL at 22:21

## 2020-02-16 RX ADMIN — Medication 100 MCG: at 12:23

## 2020-02-16 RX ADMIN — PROPOFOL 80 MG: 10 INJECTION, EMULSION INTRAVENOUS at 12:15

## 2020-02-16 RX ADMIN — SODIUM CHLORIDE, SODIUM LACTATE, POTASSIUM CHLORIDE, AND CALCIUM CHLORIDE 100 ML/HR: 600; 310; 30; 20 INJECTION, SOLUTION INTRAVENOUS at 11:39

## 2020-02-16 RX ADMIN — CEFAZOLIN SODIUM 2 G: 1 INJECTION, POWDER, FOR SOLUTION INTRAMUSCULAR; INTRAVENOUS at 12:25

## 2020-02-16 RX ADMIN — ONDANSETRON 4 MG: 2 INJECTION INTRAMUSCULAR; INTRAVENOUS at 01:40

## 2020-02-16 RX ADMIN — BISOPROLOL FUMARATE 10 MG: 5 TABLET ORAL at 09:52

## 2020-02-16 RX ADMIN — SUCCINYLCHOLINE CHLORIDE 80 MG: 20 INJECTION, SOLUTION INTRAMUSCULAR; INTRAVENOUS; PARENTERAL at 12:15

## 2020-02-16 RX ADMIN — Medication 100 MCG: at 12:22

## 2020-02-16 RX ADMIN — GLYCOPYRROLATE 0.2 MG: 0.2 INJECTION, SOLUTION INTRAMUSCULAR; INTRAVENOUS at 12:50

## 2020-02-16 RX ADMIN — SODIUM CHLORIDE, SODIUM LACTATE, POTASSIUM CHLORIDE, AND CALCIUM CHLORIDE 75 ML/HR: 600; 310; 30; 20 INJECTION, SOLUTION INTRAVENOUS at 17:09

## 2020-02-16 NOTE — ROUTINE PROCESS
TRANSFER - OUT REPORT: 
 
Verbal report given to Jasmin LAZO on Jose Watson  being transferred to [address] [place] [address] for routine post - op Report consisted of patients Situation, Background, Assessment and  
Recommendations(SBAR). Information from the following report(s) SBAR, OR Summary, MAR and Cardiac Rhythm A-fib was reviewed with the receiving nurse. Lines:  
Peripheral IV 02/14/20 Right Antecubital (Active) Site Assessment Clean, dry, & intact 2/16/2020  1:15 PM  
Phlebitis Assessment 0 2/16/2020  1:15 PM  
Infiltration Assessment 0 2/16/2020  1:15 PM  
Dressing Status Clean, dry, & intact 2/16/2020  1:15 PM  
Dressing Type Tape;Transparent 2/16/2020  1:15 PM  
Hub Color/Line Status Pink;Capped 2/16/2020  1:15 PM  
Action Taken Open ports on tubing capped 2/16/2020 11:38 AM  
Alcohol Cap Used Yes 2/16/2020 11:38 AM  
   
Peripheral IV 02/15/20 Right Forearm (Active) Site Assessment Clean, dry, & intact 2/16/2020  1:15 PM  
Phlebitis Assessment 0 2/16/2020  1:15 PM  
Infiltration Assessment 0 2/16/2020  1:15 PM  
Dressing Status Clean, dry, & intact 2/16/2020  1:15 PM  
Dressing Type Tape;Transparent 2/16/2020  1:15 PM  
Hub Color/Line Status Pink; Infusing 2/16/2020  1:15 PM  
Action Taken Open ports on tubing capped 2/16/2020 11:38 AM  
Alcohol Cap Used Yes 2/16/2020 11:38 AM  
  
 
Opportunity for questions and clarification was provided. Patient transported with: 
 O2 @ 2 liters Registered Nurse

## 2020-02-16 NOTE — PROGRESS NOTES
Problem: Lap Nilda: 2-4 hours Post-Op (Initiate SCIP measures post-procedure)  Goal: Activity/Safety  Outcome: Progressing Towards Goal  Goal: Diagnostic Test/Procedures  Outcome: Progressing Towards Goal  Goal: Nutrition/Diet  Outcome: Progressing Towards Goal  Goal: Medications  Outcome: Progressing Towards Goal  Goal: Respiratory  Outcome: Progressing Towards Goal  Goal: Treatments/Interventions/Procedures  Outcome: Progressing Towards Goal  Goal: Psychosocial  Outcome: Progressing Towards Goal  Goal: *Hemodynamically stable  Outcome: Progressing Towards Goal  Goal: *Optimal pain control at patient's stated goal  Outcome: Progressing Towards Goal  Goal: *Absence of infection signs and symptoms  Outcome: Progressing Towards Goal

## 2020-02-16 NOTE — PROGRESS NOTES
[facility]    Medical Progress Note      NAME: Carmenza Hernandez   :  1929  MRM:  [id number]    Date/Time of service 2020  7:36 AM          Assessment and Plan:     Acute Cholecystitis / IBS (irritable bowel syndrome) - Awaiting surgery today. NPO. IVF. Prn IV fentanyl and dilaudid. Empiric meropenum. Surgery will be attending to pain control, DVT prophylaxis and rehab decisions as usual.    Chronic atrial fibrillation / Chronic anticoagulation - No prior hx of stroke nor bleeding for 20yr. Patient is rate controlled. INR 1.2 today after holding warfarin and giving vitamin K. No significant risk to reversal. Resume coumadin when stable post op. Continue bisoprolol     HTN (hypertension) - Stable on amlodipine and bisoprolol. Hold lisinopril for now        Subjective:     Chief Complaint:  abd pain stable    ROS:  (bold if positive, if negative)    Tolerating PT  NPO        Objective:     Last 24hrs VS reviewed since prior progress note.  Most recent are:    Visit Vitals  /74 (BP 1 Location: Right arm, BP Patient Position: At rest)   Pulse 92   Temp 97.9 °F (36.6 °C)   Resp 16   Ht 5' 6\" (1.676 m)   Wt 58.1 kg (128 lb)   SpO2 96%   BMI 20.66 kg/m²     SpO2 Readings from Last 6 Encounters:   20 96%   20 98%   19 96%   19 94%   19 98%   19 98%            Intake/Output Summary (Last 24 hours) at 2020 0840  Last data filed at 2/15/2020 1900  Gross per 24 hour   Intake 1220 ml   Output    Net 1220 ml        Physical Exam:    Gen:  Thin, frail, in no acute distress  HEENT:  Pink conjunctivae, PERRL, hearing intact to voice, moist mucous membranes  Neck:  Supple, without masses, thyroid non-tender  Resp:  No accessory muscle use, clear breath sounds without wheezes rales or rhonchi  Card:  No murmurs, normal S1, S2 without thrills, bruits or peripheral edema  Abd:  Soft, non-tender, non-distended, normoactive bowel sounds are present, no mass  Lymph:  No cervical or inguinal adenopathy  Musc:  No cyanosis or clubbing  Skin:  No rashes or ulcers, skin turgor is good  Neuro:  Cranial nerves are grossly intact, no focal motor weakness, follows commands   Psych:  Good insight, oriented to person, place and time, alert    Telemetry reviewed:   normal sinus rhythm  __________________________________________________________________  Medications Reviewed: (see below)  Medications:     Current Facility-Administered Medications   Medication Dose Route Frequency    promethazine (PHENERGAN) 12.5 mg in NS 50 mL IVPB  12.5 mg IntraVENous Q6H PRN    meropenem (MERREM) 500 mg in 0.9% sodium chloride (MBP/ADV) 50 mL  0.5 g IntraVENous Q8H    HYDROmorphone (PF) (DILAUDID) injection 1 mg  1 mg IntraVENous Q3H PRN    fentaNYL citrate (PF) injection 25 mcg  25 mcg IntraVENous Q1H PRN    lactated Ringers infusion  75 mL/hr IntraVENous CONTINUOUS    HYDROmorphone (DILAUDID) tablet 1 mg  1 mg Oral Q4H PRN    acetaminophen (TYLENOL) tablet 650 mg  650 mg Oral Q4H PRN    ALPRAZolam (XANAX) tablet 0.25 mg  0.25 mg Oral DAILY PRN    diphenhydrAMINE (BENADRYL) injection 12.5 mg  12.5 mg IntraVENous Q4H PRN    ondansetron (ZOFRAN) injection 4 mg  4 mg IntraVENous Q4H PRN    fentaNYL citrate (PF) injection 25 mcg  25 mcg IntraVENous Q4H PRN    bisoprolol (ZEBETA) tablet 10 mg  10 mg Oral DAILY    amLODIPine (NORVASC) tablet 5 mg  5 mg Oral DAILY        Lab Data Reviewed: (see below)  Lab Review:     Recent Labs     02/16/20  0322 02/15/20  0130 02/14/20  0854   WBC 14.7* 8.5 6.0   HGB 14.2 13.8 14.9   HCT 41.7 40.7 44.0   * 137* 149*     Recent Labs     02/16/20  0322 02/15/20  0130 02/14/20  1307 02/14/20  0854   * 137  --  137   K 3.4* 3.6  --  3.6   CL 99 103  --  103   CO2 27 28  --  27   * 130*  --  153*   BUN 14 11  --  13   CREA 0.45* 0.56  --  0.62   CA 8.5 8.4*  --  8.9   MG 1.9  --   --   --    PHOS 3.0  --   --   --    ALB 3.2* 3.8  -- 4. 0   TBILI 1.7* 1.5*  --  1.1*   SGOT 34 20  --  24   ALT 23 19  --  24   INR 1.2* 1.8* 2.1*  --      No results found for: GLUCPOC  No results for input(s): PH, PCO2, PO2, HCO3, FIO2 in the last 72 hours. Recent Labs     02/16/20  0322 02/15/20  0130 02/14/20  1307   INR 1.2* 1.8* 2.1*     All Micro Results     None          Other pertinent lab: none    Total time spent with patient: 39 Minutes I reviewed chart, notes, data and current medications in the medical record. I have examined and treated the patient at bedside during this period.                  Care Plan discussed with: Patient, Nursing Staff and >50% of time spent in counseling and coordination of care    Discussed:  Care Plan    Prophylaxis:  Coumadin and H2B/PPI    Disposition:  Home w/Family           ___________________________________________________    Attending Physician: Jose Alfredo Kohli MD

## 2020-02-16 NOTE — ANESTHESIA POSTPROCEDURE EVALUATION
Procedure(s):  CHOLECYSTECTOMY LAPAROSCOPIC WITH CHOLANGIOGRAM.    general    Anesthesia Post Evaluation        Patient location during evaluation: bedside  Level of consciousness: awake  Pain management: satisfactory to patient  Airway patency: patent  Anesthetic complications: no  Cardiovascular status: acceptable  Respiratory status: acceptable  Hydration status: acceptable        Vitals Value Taken Time   /84 2/16/2020  1:25 PM   Temp 37.3 °C (99.1 °F) 2/16/2020  1:19 PM   Pulse 87 2/16/2020  1:28 PM   Resp 19 2/16/2020  1:28 PM   SpO2 100 % 2/16/2020  1:28 PM   Vitals shown include unvalidated device data.

## 2020-02-16 NOTE — BRIEF OP NOTE
BRIEF OPERATIVE NOTE    Date of Procedure: 2/16/2020   Preoperative Diagnosis: cholelithiasis  Postoperative Diagnosis: cholelithiasis    Procedure(s):  CHOLECYSTECTOMY LAPAROSCOPIC WITH CHOLANGIOGRAM  Surgeon(s) and Role:     * Ally Lim MD - Primary         Surgical Assistant: Toney Carr    Surgical Staff:  Circ-1: Von Lopez RN  Scrub Tech-1: Carlos Alvarez  Surg Asst-1: Zaida Rosario LPN  Event Time In Time Out   Incision Start 02/16/2020 1225    Incision Close 02/16/2020 1302      Anesthesia: General   Estimated Blood Loss: 5  Specimens:   ID Type Source Tests Collected by Time Destination   1 : gallbladder  Preservative Gallbladder  Ally Lim MD 2/16/2020 1247 Pathology      Findings: dead gallbladder   Complications: none   Implants: none

## 2020-02-16 NOTE — PROGRESS NOTES
Problem: Falls - Risk of  Goal: *Absence of Falls  Description  Document Song Pleitez Fall Risk and appropriate interventions in the flowsheet.   Outcome: Progressing Towards Goal  Note: Fall Risk Interventions:  Mobility Interventions: Patient to call before getting OOB         Medication Interventions: Patient to call before getting OOB    Elimination Interventions: Patient to call for help with toileting needs

## 2020-02-16 NOTE — ANESTHESIA PREPROCEDURE EVALUATION
Relevant Problems   No relevant active problems       Anesthetic History   No history of anesthetic complications            Review of Systems / Medical History  Patient summary reviewed, nursing notes reviewed and pertinent labs reviewed    Pulmonary  Within defined limits                 Neuro/Psych   Within defined limits           Cardiovascular    Hypertension        Dysrhythmias : atrial fibrillation    Pertinent negatives: CAD: WARFARIN D/C 12/7/2019 LAST DOSE.       GI/Hepatic/Renal                Endo/Other        Arthritis and cancer (BREAST)     Other Findings              Physical Exam    Airway  Mallampati: II  TM Distance: < 4 cm  Neck ROM: normal range of motion   Mouth opening: Normal     Cardiovascular    Rhythm: irregular  Rate: normal         Dental  No notable dental hx       Pulmonary  Breath sounds clear to auscultation               Abdominal  GI exam deferred       Other Findings            Anesthetic Plan    ASA: 3  Anesthesia type: general          Induction: Intravenous  Anesthetic plan and risks discussed with: Patient

## 2020-02-17 ENCOUNTER — TELEPHONE (OUTPATIENT)
Dept: INTERNAL MEDICINE CLINIC | Age: 85
End: 2020-02-17

## 2020-02-17 ENCOUNTER — HOME HEALTH ADMISSION (OUTPATIENT)
Dept: HOME HEALTH SERVICES | Facility: HOME HEALTH | Age: 85
End: 2020-02-17

## 2020-02-17 VITALS
HEIGHT: 66 IN | TEMPERATURE: 97.8 F | RESPIRATION RATE: 16 BRPM | DIASTOLIC BLOOD PRESSURE: 76 MMHG | BODY MASS INDEX: 20.57 KG/M2 | HEART RATE: 77 BPM | WEIGHT: 128 LBS | SYSTOLIC BLOOD PRESSURE: 131 MMHG | OXYGEN SATURATION: 97 %

## 2020-02-17 LAB
ALBUMIN SERPL-MCNC: 2.8 G/DL (ref 3.5–5)
ALBUMIN/GLOB SERPL: 1 {RATIO} (ref 1.1–2.2)
ALP SERPL-CCNC: 103 U/L (ref 45–117)
ALT SERPL-CCNC: 41 U/L (ref 12–78)
ANION GAP SERPL CALC-SCNC: 5 MMOL/L (ref 5–15)
AST SERPL-CCNC: 49 U/L (ref 15–37)
BILIRUB SERPL-MCNC: 1.2 MG/DL (ref 0.2–1)
BUN SERPL-MCNC: 19 MG/DL (ref 6–20)
BUN/CREAT SERPL: 33 (ref 12–20)
CALCIUM SERPL-MCNC: 8.4 MG/DL (ref 8.5–10.1)
CHLORIDE SERPL-SCNC: 102 MMOL/L (ref 97–108)
CO2 SERPL-SCNC: 30 MMOL/L (ref 21–32)
COMMENT, HOLDF: NORMAL
CREAT SERPL-MCNC: 0.57 MG/DL (ref 0.55–1.02)
GLOBULIN SER CALC-MCNC: 2.8 G/DL (ref 2–4)
GLUCOSE SERPL-MCNC: 122 MG/DL (ref 65–100)
INR PPP: 1.2 (ref 0.9–1.1)
POTASSIUM SERPL-SCNC: 3.9 MMOL/L (ref 3.5–5.1)
PROT SERPL-MCNC: 5.6 G/DL (ref 6.4–8.2)
PROTHROMBIN TIME: 11.9 SEC (ref 9–11.1)
SAMPLES BEING HELD,HOLD: NORMAL
SODIUM SERPL-SCNC: 137 MMOL/L (ref 136–145)

## 2020-02-17 PROCEDURE — 97161 PT EVAL LOW COMPLEX 20 MIN: CPT

## 2020-02-17 PROCEDURE — 74011000258 HC RX REV CODE- 258: Performed by: SURGERY

## 2020-02-17 PROCEDURE — 74011250636 HC RX REV CODE- 250/636: Performed by: SURGERY

## 2020-02-17 PROCEDURE — 74011250637 HC RX REV CODE- 250/637: Performed by: INTERNAL MEDICINE

## 2020-02-17 PROCEDURE — 97116 GAIT TRAINING THERAPY: CPT

## 2020-02-17 PROCEDURE — 74011250637 HC RX REV CODE- 250/637: Performed by: SURGERY

## 2020-02-17 PROCEDURE — 80053 COMPREHEN METABOLIC PANEL: CPT

## 2020-02-17 PROCEDURE — 85610 PROTHROMBIN TIME: CPT

## 2020-02-17 PROCEDURE — 36415 COLL VENOUS BLD VENIPUNCTURE: CPT

## 2020-02-17 RX ORDER — WARFARIN SODIUM 5 MG/1
5 TABLET ORAL
Status: COMPLETED | OUTPATIENT
Start: 2020-02-17 | End: 2020-02-17

## 2020-02-17 RX ORDER — WARFARIN 2.5 MG/1
2.5 TABLET ORAL
Status: DISCONTINUED | OUTPATIENT
Start: 2020-02-17 | End: 2020-02-17

## 2020-02-17 RX ORDER — ONDANSETRON 4 MG/1
4 TABLET, ORALLY DISINTEGRATING ORAL
Status: DISCONTINUED | OUTPATIENT
Start: 2020-02-17 | End: 2020-02-17 | Stop reason: HOSPADM

## 2020-02-17 RX ORDER — POLYETHYLENE GLYCOL 3350 17 G/17G
17 POWDER, FOR SOLUTION ORAL DAILY
Status: DISCONTINUED | OUTPATIENT
Start: 2020-02-17 | End: 2020-02-17 | Stop reason: HOSPADM

## 2020-02-17 RX ORDER — DIPHENHYDRAMINE HCL 25 MG
25 CAPSULE ORAL
Status: DISCONTINUED | OUTPATIENT
Start: 2020-02-17 | End: 2020-02-17 | Stop reason: HOSPADM

## 2020-02-17 RX ORDER — WARFARIN SODIUM 5 MG/1
5 TABLET ORAL ONCE
Status: DISCONTINUED | OUTPATIENT
Start: 2020-02-17 | End: 2020-02-17

## 2020-02-17 RX ORDER — IBUPROFEN 400 MG/1
400 TABLET ORAL
Status: DISCONTINUED | OUTPATIENT
Start: 2020-02-17 | End: 2020-02-17 | Stop reason: HOSPADM

## 2020-02-17 RX ADMIN — ACETAMINOPHEN 650 MG: 325 TABLET ORAL at 17:24

## 2020-02-17 RX ADMIN — ACETAMINOPHEN 650 MG: 325 TABLET ORAL at 04:07

## 2020-02-17 RX ADMIN — SODIUM CHLORIDE, SODIUM LACTATE, POTASSIUM CHLORIDE, AND CALCIUM CHLORIDE 75 ML/HR: 600; 310; 30; 20 INJECTION, SOLUTION INTRAVENOUS at 08:12

## 2020-02-17 RX ADMIN — MEROPENEM 500 MG: 500 INJECTION, POWDER, FOR SOLUTION INTRAVENOUS at 01:50

## 2020-02-17 RX ADMIN — BISOPROLOL FUMARATE 10 MG: 5 TABLET ORAL at 08:05

## 2020-02-17 RX ADMIN — WARFARIN SODIUM 5 MG: 5 TABLET ORAL at 16:26

## 2020-02-17 RX ADMIN — MEROPENEM 500 MG: 500 INJECTION, POWDER, FOR SOLUTION INTRAVENOUS at 08:04

## 2020-02-17 RX ADMIN — AMLODIPINE BESYLATE 5 MG: 5 TABLET ORAL at 08:05

## 2020-02-17 NOTE — PROGRESS NOTES
Nutrition Assessment:    RECOMMENDATIONS/INTERVENTION(S):   1. Advance diet as medically able, recommend goal Regular diet. 2. Recommend Ensure Clear BID (400 kcal, 14 gm protein) and Gelatein x1/d (90 kcal, 20 gm protein) to promote adequate kcal/ protein intake while on CLD. 3. Monitor PO intake, GI function, labs, and weight trends. ASSESSMENT:   2/17: 79 y/o F admitted with cholecystitis. Pt screen for low BMI. PMH - IBS, diverticulosis, HTN, breast CA s/p lumpectomy, osteoarthritis. S/p laparoscopic cholecystectomy 2/16. On CLD; pt has been NPO/ CLD since admit. Meal intake per EMR shows 5-75% meal intake. Pt tolerating CLD, denies abdominal pain and no n/v. Pt reports good appetite PTA, consuming x3 meals/d. Pt with Hx IBS; avoids dairy products, avoids red meats (eats chicken, fish, and occasional pork), and avoids some high FODMAP foods per recall.  lb. BMI 2.7 c/w underweight for age. Estimated nutritional needs +250 kcal to promote healthy weight. Weight Hx per EMR shows recent weight stability. No c/s difficulties. LBM 2/17. Skin without PI. Briefly discussed low fat diet s/p cholecystectomy. Based on food recall, seems likes pt follows a low fat diet in general. Recommending regular diet at this time. Labs - Ca 8.4 L. Meds - LR 75 ml/hr. Diet Order: Clear liquids  % Eaten:    Patient Vitals for the past 72 hrs:   % Diet Eaten   02/16/20 1800 65 %   02/16/20 0830 0 %   02/15/20 1800 0 %   02/15/20 1200 5 %   02/15/20 1000 10 %   02/14/20 1500 75 %       Pertinent Medications: [x] Reviewed    Labs: [x] Reviewed    Anthropometrics: Height: 5' 6\" (167.6 cm) Weight: 58.1 kg (128 lb)    IBW (%IBW):   ( ) UBW (%UBW):   (  %)      BMI: Body mass index is 20.66 kg/m².     This BMI is indicative of:   [x] Underweight for age    [] Normal    [] Overweight    []  Obesity    []  Extreme Obesity (BMI>40)  Estimated Nutrition Needs (Based on): 1567 Kcals/day(REE 1013 x 1.3 + 250 kcal ) , 70 g(58 - 70 gm (1.0 - 1.2 gm/kg)) Protein  Carbohydrate: At Least 130 g/day  Fluids: 1567 mL/day (1 ml/kcal)    Last BM: 2/17   [x]Active     []Hyperactive  []Hypoactive       [] Absent   BS  Skin:    [] Intact   [x] Incision - abdomen  [] Breakdown   [] DTI   [] Tears/Excoriation/Abrasion  []Edema [] Other:    Wt Readings from Last 30 Encounters:   02/14/20 58.1 kg (128 lb)   02/06/20 58.5 kg (129 lb)   12/19/19 55.3 kg (122 lb)   12/13/19 55.7 kg (122 lb 12.7 oz)   12/11/19 56 kg (123 lb 6.4 oz)   11/21/19 56.2 kg (124 lb)   10/10/19 56.2 kg (124 lb)   08/19/19 56.2 kg (124 lb)   05/09/19 56.7 kg (125 lb)   02/07/19 57.2 kg (126 lb)   11/08/18 54.9 kg (121 lb)   08/09/18 56.2 kg (123 lb 12.8 oz)   05/08/18 56.2 kg (124 lb)   02/06/18 57.6 kg (127 lb)   10/12/17 56.7 kg (125 lb)   08/01/17 57.2 kg (126 lb)   07/06/17 56.7 kg (125 lb)   06/26/17 57.2 kg (126 lb)   06/12/17 57.2 kg (126 lb)   02/14/17 57.2 kg (126 lb)   11/17/16 55.8 kg (123 lb)   08/18/16 55.7 kg (122 lb 12.8 oz)   05/17/16 55.9 kg (123 lb 3.2 oz)   02/16/16 56.7 kg (125 lb)   01/12/16 56.1 kg (123 lb 9.6 oz)   10/19/15 57.1 kg (125 lb 12.8 oz)   10/05/15 56.2 kg (124 lb)      NUTRITION DIAGNOSES:   Problem 1:  Underweight   Problem 2: Inadequate energy intake   Etiology 1: related to decreased ability to consume sufficient energy   Etiology 2: r/t decreased ability to consume sufficient energy   Signs/Symptoms 1: as evidenced by BMI 20.7 c/w underweight for age   Signs/ Symptoms 2: AEB NPO/ CLD since admit    NUTRITION INTERVENTIONS:  Meals/Snacks: General/healthful diet   Supplements: Commercial supplement              GOAL:   PO intake >75% + ONS with progression towards diet advancement and gradual weight gain within 3-5 days     Cultural, Episcopal, or Ethnic Dietary Needs: None     EDUCATION & DISCHARGE NEEDS:    [x] None Identified   [] Identified and Education Provided/Documented   [] Identified and Pt declined/was not appropriate      [x] Interdisciplinary Care Plan Reviewed/Documented    [x] Discharge Needs:    Regular diet + ONS daily    [] No Nutrition Related Discharge Needs    NUTRITION RISK:   Pt Is At Nutrition Risk  [x]     No Nutrition Risk Identified  []       PT SEEN FOR:    []  MD Consult: []Calorie Count      []Diabetic Diet Education        []Diet Education     []Electrolyte Management     []General Nutrition Management and Supplements     []Management of Tube Feeding     []TPN Recommendations    []  RN Referral:  []MST score >=2     []Enteral/Parenteral Nutrition PTA     []Pregnant: Gestational DM or Multigestation                 [] Pressure Ulcer    [x]  Low BMI      []  Length of Stay       [] Dysphagia Diet         [] Ventilator  []  Follow-up     Previous Recommendations:   [] Implemented          [] Not Implemented          [x] Not Applicable    Previous Goal:   [] Met              [] Progressing Towards Goal              [] Not Progressing Towards Goal   [x] Not Applicable            Brian Guerra, [address] [facility]  Pager [phone]  Phone [phone]

## 2020-02-17 NOTE — PROGRESS NOTES
Doing well. Pain controlled. Hungry, voiding. VSS    Abdomen soft, nondistended. Incisions with glue in place. S/P cholecystectomy. On coumadin for chronic atrial fibrillation. Doing well. Coumadin re-started. Would consider discharge on BID Lovenox for bridge tomorrow, but patient has never bridged in the past with injection. Will discuss with hospitalist.    Advance diet today. PO meds. Claudia Whitman MD     Addendum:  OK for home today. Doing well. Will re-start Coumadin and have patient see Dr. Cameron Abraham sometime this week for re-check INR.

## 2020-02-17 NOTE — PROGRESS NOTES
Pharmacist Discharge Medication Reconciliation    Discharge Provider:  Dr. Kaylen Dee    ·  Regarding warfarin- patient usually takes in the AM. Will give 5 mg dose x 1 as scheduled prior to discharge and patient to resume 5 mg daily dosing tomorrow AM. Plan for INR re-check within the next 5-7 days with her PCP pending scheduling per CM. Discharge Medications:      My Medications        CONTINUE taking these medications        Instructions Each Dose to Equal Morning Noon Evening Bedtime   ALPRAZolam 0.25 mg tablet  Commonly known as:  XANAX    Your last dose was: Your next dose is: Take 0.25 mg by mouth daily as needed for Anxiety. 0.25 mg                 amLODIPine 5 mg tablet  Commonly known as:  NORVASC    Your last dose was: Your next dose is:          TAKE ONE TABLET BY MOUTH ONE TIME DAILY                  bisoprolol-hydroCHLOROthiazide 10-6.25 mg per tablet  Commonly known as:  ZIAC    Your last dose was: Your next dose is:          TAKE ONE TABLET BY MOUTH ONE TIME DAILY                  celecoxib 200 mg capsule  Commonly known as:  CELEBREX    Your last dose was: Your next dose is:          TAKE ONE CAPSULE BY MOUTH ONE TIME DAILY AS NEEDED                  CENTRUM SILVER PO    Your last dose was: Your next dose is: Take 1 Tab by mouth daily. 1 Tab                 cyanocobalamin 1,000 mcg tablet    Your last dose was: Your next dose is: Take 1,000 mcg by mouth daily. 1,000 mcg                 Flonase Allergy Relief 50 mcg/actuation nasal spray  Generic drug:  fluticasone propionate    Your last dose was: Your next dose is: 2 Sprays by Both Nostrils route daily as needed for Rhinitis or Allergies. 2 Spray                 lisinopril 10 mg tablet  Commonly known as:  Taylor Jiménez    Your last dose was:       Your next dose is:          TAKE ONE TABLET BY MOUTH ONE TIME DAILY                  methocarbamol 500 mg tablet  Commonly known as:  ROBAXIN    Your last dose was: Your next dose is: Take 500 mg by mouth three (3) times daily as needed for Muscle Spasm(s). 500 mg                 Miralax 17 gram/dose powder  Generic drug:  polyethylene glycol    Your last dose was: Your next dose is: Take 17 g by mouth nightly. 17 g                 Vitamin D3 (1000 Units /25 mcg) tablet  Generic drug:  cholecalciferol    Your last dose was: Your next dose is: Take 1,000 Units by mouth daily. 1,000 Units                 Voltaren 1 % Gel  Generic drug:  diclofenac    Your last dose was: Your next dose is:          Apply 2 g to affected area nightly. Apply to shoulder   2 g                 warfarin 5 mg tablet  Commonly known as:  [place]    Your last dose was:       Your next dose is:          TAKE ONE TABLET BY MOUTH ONE TIME DAILY                         STOP taking these medications      Macrodantin 50 mg capsule  Generic drug:  nitrofurantoin                The patient's chart, MAR, and AVS were reviewed by   RICARDA Calvert,   Contact: [phone]

## 2020-02-17 NOTE — PROGRESS NOTES
2/17/2020 11:25 AM EMR reviewed. PT eval order noted. CM will follow for final discharge recommendations.  SERENA TrinhW

## 2020-02-17 NOTE — PROGRESS NOTES
[facility] Dosing Services: 02/17/20    Consult for Warfarin Dosing by Pharmacy by Dr. Delatorre [facility] provided for this 80 y.o.  female , for indication of Atrial Fibrillation  Day of Therapy: Resumed from PTA medication list  Home Regimen: 5 mg PO daily  Dose to achieve an INR goal of 2-3    Assessment/Plan:  H/H and platelets stable, appears to have mild thrombocytopenia at baseline, no documentation of any bleeding events in progress note  Today's INR of 1.2 is sub-therapeutic for indication s/p vitamin K 10 mg IV on 2/15  Will order warfarin 5 mg PO x 1 tonight at 1800 to continue home regimen, although effects of vitamin K will likely linger on for a few more days  INR ordered daily with AM labs by MD    Significant drug interactions: Vitamin K 10 mg IV on 2/15  Previous dose given 5 mg on 2/13 per med rec   PT/INR Lab Results   Component Value Date/Time    INR 1.2 (H) 02/16/2020 03:22 AM      Platelets Lab Results   Component Value Date/Time    PLATELET 856 (L) 64/18/3204 03:22 AM      H/H Lab Results   Component Value Date/Time    HGB 14.2 02/16/2020 03:22 AM        Pharmacy to follow daily and will provide subsequent Warfarin dosing based on clinical status.   JODI Aguero  Contact information [phone]

## 2020-02-17 NOTE — PROGRESS NOTES
Pharmacist Discharge Medication Reconciliation Discharge Provider:  Dr. Yesi Newberry Discharge Medications: My Medications CONTINUE taking these medications Instructions Each Dose to Equal Morning Noon Evening Bedtime ALPRAZolam 0.25 mg tablet Commonly known as:  Donald Chahal Your last dose was: Your next dose is: Take 0.25 mg by mouth daily as needed for Anxiety. 0.25 mg 
  
  
  
  
  
amLODIPine 5 mg tablet Commonly known as:  Lashell Ram Your last dose was: Your next dose is: TAKE ONE TABLET BY MOUTH ONE TIME DAILY 
   
  
  
  
  
bisoprolol-hydroCHLOROthiazide 10-6.25 mg per tablet Commonly known as:  [facility] Your last dose was: Your next dose is: TAKE ONE TABLET BY MOUTH ONE TIME DAILY 
   
  
  
  
  
celecoxib 200 mg capsule Commonly known as:  CELEBREX Your last dose was: Your next dose is: TAKE ONE CAPSULE BY MOUTH ONE TIME DAILY AS NEEDED CENTRUM SILVER PO Your last dose was: Your next dose is: Take 1 Tab by mouth daily. 1 Tab 
  
  
  
  
  
cyanocobalamin 1,000 mcg tablet Your last dose was: Your next dose is: Take 1,000 mcg by mouth daily. 1,000 mcg Flonase Allergy Relief 50 mcg/actuation nasal spray Generic drug:  fluticasone propionate Your last dose was: Your next dose is: 2 Sprays by Both Nostrils route daily as needed for Rhinitis or Allergies. 2 Spray 
  
  
  
  
  
lisinopril 10 mg tablet Commonly known as:  Thereugenio Burnetts Your last dose was: Your next dose is: TAKE ONE TABLET BY MOUTH ONE TIME DAILY 
   
  
  
  
  
methocarbamol 500 mg tablet Commonly known as:  ROBAXIN Your last dose was: Your next dose is: Take 500 mg by mouth three (3) times daily as needed for Muscle Spasm(s). 500 mg 
  
  
  
  
  
Miralax 17 gram/dose powder Generic drug:  polyethylene glycol Your last dose was: Your next dose is: Take 17 g by mouth nightly. 17 g Vitamin D3 (1000 Units /25 mcg) tablet Generic drug:  cholecalciferol Your last dose was: Your next dose is: Take 1,000 Units by mouth daily. 1,000 Units Voltaren 1 % Gel Generic drug:  diclofenac Your last dose was: Your next dose is:   
 
  
 Apply 2 g to affected area nightly. Apply to shoulder 2 g 
  
  
  
  
  
warfarin 5 mg tablet Commonly known as:  [place] Your last dose was: Your next dose is: TAKE ONE TABLET BY MOUTH ONE TIME DAILY 
   
  
  
  
  
 
  
 
STOP taking these medications Macrodantin 50 mg capsule Generic drug:  nitrofurantoin The patient's chart, MAR, and AVS were reviewed by 
 Vance King, PHARMD, Contact: [phone]

## 2020-02-17 NOTE — PROGRESS NOTES
Problem: Mobility Impaired (Adult and Pediatric)  Goal: *Acute Goals and Plan of Care (Insert Text)  Description  FUNCTIONAL STATUS PRIOR TO ADMISSION: Patient was modified independent using a single point cane for functional mobility. HOME SUPPORT PRIOR TO ADMISSION: The patient lived with family but did not require assist.    Physical Therapy Goals  Initiated 2/17/2020  1. Patient will move from supine to sit and sit to supine , scoot up and down and roll side to side in bed with independence within 7 day(s). 2.  Patient will transfer from bed to chair and chair to bed with modified independence using the least restrictive device within 7 day(s). 3.  Patient will perform sit to stand with modified independence within 7 day(s). 4.  Patient will ambulate with modified independence for 200 feet with the least restrictive device within 7 day(s). 5.  Patient will ascend/descend 4 stairs with  handrail(s) with modified independence within 7 day(s). Outcome: Progressing Towards Goal   PHYSICAL THERAPY EVALUATION  Patient: Zay Johnson (80 y.o. female)  Date: 2/17/2020  Primary Diagnosis: Cholecystitis [K81.9]  Acute cholecystitis [K81.0]  Procedure(s) (LRB):  CHOLECYSTECTOMY LAPAROSCOPIC WITH CHOLANGIOGRAM (N/A) 1 Day Post-Op   Precautions: fall         ASSESSMENT  Based on the objective data described below, the patient presents with generalized weakness and debility. Sit on the edge of bed modified independent, sit to stand SBA, ambulate with rolling walker out on the 4th floor hallway CGA, decreased pace no loss of balance. OOB to chair as tolerated performed some active range of motion exercise on both LE all planes. Patient stated she uses single point cane out on the community and none inside the house. Instructed patient to use rolling walker for now until she feel much stronger and safer. Patient can ambulate with family in the hallway with the use of rolling walker.  Notified nurse who agreed to monitor patient. Current Level of Function Impacting Discharge (mobility/balance): CGA with ambulation using a rolling walker    Functional Outcome Measure: The patient scored 75/100 on the barthel index outcome measure      Other factors to consider for discharge: none     Patient will benefit from skilled therapy intervention to address the above noted impairments. PLAN :  Recommendations and Planned Interventions: bed mobility training, transfer training, gait training, therapeutic exercises, neuromuscular re-education, patient and family training/education, and therapeutic activities      Frequency/Duration: Patient will be followed by physical therapy:  5 times a week to address goals. Recommendation for discharge: (in order for the patient to meet his/her long term goals)  Physical therapy at least 2 days/week in the home     This discharge recommendation:  Has been made in collaboration with the attending provider and/or case management    IF patient discharges home will need the following DME: none         SUBJECTIVE:   Patient stated ok we can sit up.     OBJECTIVE DATA SUMMARY:   HISTORY:    Past Medical History:   Diagnosis Date    Arrhythmia     A-Fib  on Warfarin    Arthritis     hands and knees bilaterally    Breast cancer ([facility] [address] 2019    left breast    Diverticula, colon     Hypertension     IBS (irritable bowel syndrome)     Irregular heart beat     Psychiatric disorder     some anxiety and depression     Past Surgical History:   Procedure Laterality Date    HX BREAST LUMPECTOMY Left 2019    LEFT BREAST LUMPECTOMY AND LEFT BREAST SENTINEL NODE BIOPSY WIHT BLUE DYE performed by Windsor Runner, MD at [address] [place] [address] CATARACT REMOVAL Left     cataract    HX CATARACT REMOVAL Right     cataract    HX  SECTION      X 3    HX COLONOSCOPY  8/28/15    with endoscopy day before    HX HYSTERECTOMY      partial    NEUROLOGICAL PROCEDURE UNLISTED   lumbar surgery        Personal factors and/or comorbidities impacting plan of care:     Home Situation  Home Environment: Private residence  # Steps to Enter: 3  One/Two Story Residence: One story  Living Alone: No  Support Systems: Family member(s)  Patient Expects to be Discharged to[de-identified] Private residence  Current DME Used/Available at Home: romeo Chen, Walker    EXAMINATION/PRESENTATION/DECISION MAKING:   Critical Behavior:  Neurologic State: Alert  Orientation Level: Oriented X4  Cognition: Follows commands     Hearing: Auditory  Auditory Impairment: Hard of hearing, bilateral  Hearing Aids/Status: Does not own    Range Of Motion:  AROM: Within functional limits           PROM: Within functional limits           Strength:    Strength: Within functional limits                    Tone & Sensation:                                  Coordination:  Coordination: Within functional limits  Vision:      Functional Mobility:  Bed Mobility:  Rolling: Modified independent  Supine to Sit: Modified independent  Sit to Supine: Modified independent  Scooting: Modified independent  Transfers:  Sit to Stand: Stand-by assistance  Stand to Sit: Stand-by assistance  Stand Pivot Transfers: Contact guard assistance     Bed to Chair: Contact guard assistance              Balance:   Sitting: Intact  Standing: Impaired; With support  Standing - Static: Good  Standing - Dynamic : Fair  Ambulation/Gait Training:  Distance (ft): 150 Feet (ft)  Assistive Device: Walker, rolling;Gait belt  Ambulation - Level of Assistance: Contact guard assistance     Gait Description (WDL): Exceptions to WDL  Gait Abnormalities: Path deviations              Speed/Yessenia: Pace decreased (<100 feet/min)                            Therapeutic Exercises:    Instructed patient to continue active range of motion exercise on both legs while up on chair or on bed.       Functional Measure:  Barthel Index:    Bathin  Bladder: 10  Bowels: 10  Grooming: 5  Dressing: 10  Feeding: 10  Mobility: 10  Stairs: 0  Toilet Use: 5  Transfer (Bed to Chair and Back): 10  Total: 75/100       The Barthel ADL Index: Guidelines  1. The index should be used as a record of what a patient does, not as a record of what a patient could do. 2. The main aim is to establish degree of independence from any help, physical or verbal, however minor and for whatever reason. 3. The need for supervision renders the patient not independent. 4. A patient's performance should be established using the best available evidence. Asking the patient, friends/relatives and nurses are the usual sources, but direct observation and common sense are also important. However direct testing is not needed. 5. Usually the patient's performance over the preceding 24-48 hours is important, but occasionally longer periods will be relevant. 6. Middle categories imply that the patient supplies over 50 per cent of the effort. 7. Use of aids to be independent is allowed. Jessika Lee., Barthel, D.W. ([id number]). Functional evaluation: the Barthel Index. [address] [facility] (14)2. Annette Dickerson ramona MANUEL Paz, Mike Santiago., [facility] Clancy., [place], [address] [facility] (1999). Measuring the change indisability after inpatient rehabilitation; comparison of the responsiveness of the Barthel Index and Functional [place] Measure. Journal of Neurology, Neurosurgery, and Psychiatry, 66(4), 448-730. Kya Herrera, N.PRAVEENA.A, VIRGEN Yu, & Sandhya Corona M.A. (2004.) Assessment of post-stroke quality of life in cost-effectiveness studies: The usefulness of the Barthel Index and the EuroQoL-5D.  Quality of Life Research, 15, 183-52           Physical Therapy Evaluation Charge Determination   History Examination Presentation Decision-Making   LOW Complexity : Zero comorbidities / personal factors that will impact the outcome / POC LOW Complexity : 1-2 Standardized tests and measures addressing body structure, function, activity limitation and / or participation in recreation  LOW Complexity : Stable, uncomplicated  Other outcome measures barthel  LOW       Based on the above components, the patient evaluation is determined to be of the following complexity level: LOW     Pain Ratin/10    Activity Tolerance:   Good  Please refer to the flowsheet for vital signs taken during this treatment. After treatment patient left in no apparent distress:   Sitting in chair and Call bell within reach    COMMUNICATION/EDUCATION:   The patients plan of care was discussed with: Registered Nurse and . Fall prevention education was provided and the patient/caregiver indicated understanding. Thank you for this referral.  Jaxon Mcdonough PT,WCC.    Time Calculation: 28 mins

## 2020-02-17 NOTE — PROGRESS NOTES
[facility]    Medical Progress Note      NAME: Arnold Bennett   :  1929  MRM:  [id number]    Date/Time of service 2020  7:36 AM          Assessment and Plan:     Acute Cholecystitis / IBS (irritable bowel syndrome) - Tolerated lap arlene . Empiric meropenum. Surgery is attending to pain control, DVT prophylaxis and rehab decisions as usual.  She is tolerating clear diet with minimal pain. Likely can DC ASAP, pending PT eval.    Chronic atrial fibrillation / Chronic anticoagulation - No prior hx of stroke nor bleeding for 20yr. Patient is rate controlled. INR 1.2  after holding warfarin and giving vitamin K. No significant risk to reversal. Resume coumadin now that she is stable post op. Continue bisoprolol     HTN (hypertension) - Stable on amlodipine and bisoprolol. Hold lisinopril and HCTZ for now, but can resume at home     Patient is medically stable, and can be discharged when surgery is ready. I will sign off. Please call with any questions. Subjective:     Chief Complaint:  abd pain low    ROS:  (bold if positive, if negative)    Tolerating PT  Tolerating clear diet        Objective:     Last 24hrs VS reviewed since prior progress note.  Most recent are:    Visit Vitals  /77 (BP 1 Location: Right arm, BP Patient Position: At rest)   Pulse 80   Temp 97.3 °F (36.3 °C)   Resp 18   Ht 5' 6\" (1.676 m)   Wt 58.1 kg (128 lb)   SpO2 92%   BMI 20.66 kg/m²     SpO2 Readings from Last 6 Encounters:   20 92%   20 98%   19 96%   19 94%   19 98%   19 98%    O2 Flow Rate (L/min): 2 l/min       Intake/Output Summary (Last 24 hours) at 2020 0954  Last data filed at 2020 1900  Gross per 24 hour   Intake 1366 ml   Output 10 ml   Net 1356 ml        Physical Exam:    Gen:  Thin, frail, in no acute distress  HEENT:  Pink conjunctivae, PERRL, hearing intact to voice, moist mucous membranes  Neck:  Supple, without masses, thyroid non-tender  Resp:  No accessory muscle use, clear breath sounds without wheezes rales or rhonchi  Card:  No murmurs, normal S1, S2 without thrills, bruits or peripheral edema  Abd:  Soft, non-tender, non-distended, normoactive bowel sounds are present, no mass  Lymph:  No cervical or inguinal adenopathy  Musc:  No cyanosis or clubbing  Skin:  No rashes or ulcers, skin turgor is good  Neuro:  Cranial nerves are grossly intact, mild motor weakness, follows commands   Psych:  Good insight, oriented to person, place and time, alert    Telemetry reviewed:   normal sinus rhythm  __________________________________________________________________  Medications Reviewed: (see below)  Medications:     Current Facility-Administered Medications   Medication Dose Route Frequency    promethazine (PHENERGAN) 12.5 mg in NS 50 mL IVPB  12.5 mg IntraVENous Q6H PRN    meropenem (MERREM) 500 mg in 0.9% sodium chloride (MBP/ADV) 50 mL  0.5 g IntraVENous Q8H    HYDROmorphone (PF) (DILAUDID) injection 1 mg  1 mg IntraVENous Q3H PRN    fentaNYL citrate (PF) injection 25 mcg  25 mcg IntraVENous Q1H PRN    lactated Ringers infusion  75 mL/hr IntraVENous CONTINUOUS    HYDROmorphone (DILAUDID) tablet 1 mg  1 mg Oral Q4H PRN    acetaminophen (TYLENOL) tablet 650 mg  650 mg Oral Q4H PRN    ALPRAZolam (XANAX) tablet 0.25 mg  0.25 mg Oral DAILY PRN    diphenhydrAMINE (BENADRYL) injection 12.5 mg  12.5 mg IntraVENous Q4H PRN    ondansetron (ZOFRAN) injection 4 mg  4 mg IntraVENous Q4H PRN    fentaNYL citrate (PF) injection 25 mcg  25 mcg IntraVENous Q4H PRN    bisoprolol (ZEBETA) tablet 10 mg  10 mg Oral DAILY    amLODIPine (NORVASC) tablet 5 mg  5 mg Oral DAILY        Lab Data Reviewed: (see below)  Lab Review:     Recent Labs     02/16/20  0322 02/15/20  0130   WBC 14.7* 8.5   HGB 14.2 13.8   HCT 41.7 40.7   * 137*     Recent Labs     02/17/20  0420 02/16/20  0322 02/15/20  0130 02/14/20  1307    135* 137  --    K 3.9 3. 4* 3.6  --     99 103  --    CO2 30 27 28  --    * 122* 130*  --    BUN 19 14 11  --    CREA 0.57 0.45* 0.56  --    CA 8.4* 8.5 8.4*  --    MG  --  1.9  --   --    PHOS  --  3.0  --   --    ALB 2.8* 3.2* 3.8  --    TBILI 1.2* 1.7* 1.5*  --    SGOT 49* 34 20  --    ALT 41 23 19  --    INR  --  1.2* 1.8* 2.1*     No results found for: GLUCPOC  No results for input(s): PH, PCO2, PO2, HCO3, FIO2 in the last 72 hours. Recent Labs     02/16/20  0322 02/15/20  0130 02/14/20  1307   INR 1.2* 1.8* 2.1*     All Micro Results     None          Other pertinent lab: none    Total time spent with patient: 39 Minutes I reviewed chart, notes, data and current medications in the medical record. I have examined and treated the patient at bedside during this period.                  Care Plan discussed with: Patient, Nursing Staff and >50% of time spent in counseling and coordination of care    Discussed:  Care Plan    Prophylaxis:  Coumadin and H2B/PPI    Disposition:  Home w/Family           ___________________________________________________    Attending Physician: Cecilia Bailey MD

## 2020-02-17 NOTE — DISCHARGE INSTRUCTIONS
Please see Dr. Paul Heart for follow up and re-check INR between Wednesday and Friday of this week. Warfarin 5 mg last dose given 2/17/20 evening prior to hospital discharge. Please resume home warfarin dose 5 mg daily on  2/18/20 AM as scheduled. PATIENT INSTRUCTIONS  GALL BLADDER SURGERY  (CHOLECYSTECTOMY)    FOLLOW-UP:  Please make an appointment with your physician in 2 week(s). Call your physician immediately if you have any fevers greater than 102.5, drainage from your wound that is not clear or looks infected, persistent bleeding, increasing abdominal pain, problems urinating, or persistent nausea/vomiting. You should be aware that you may have right shoulder pain after surgery and that this will progressively go away. This is called 'referred pain' and is from the area of the gallbladder. It can also be caused by gas that may be trapped under the diaphragm from the surgery, especially if it was performed laparoscopically through mini-incisions. This gas will progressively get reabsorbed by your body. WOUND CARE INSTRUCTIONS:  Keep a dry clean dressing on the wound if there is drainage. The initial bandage may be removed after 24 hours. Once the wound has quit draining you may leave it open to air. If clothing rubs against the wound or causes irritation and the wound is not draining you may cover it with a dry dressing during the daytime. Try to keep the wound dry and avoid ointments on the wound unless directed to do so. If the wound becomes bright red and painful or starts to drain infected material that is not clear, please contact your physician immediately. You should also call if you begin to drain fluid that is thin and greenish-brown from the wound and appears to look like bile. If the wound though is mildly pink and has a thick firm ridge underneath it, this is normal, and is referred to as a healing ridge. This will resolve over the next 4-6 weeks.     DIET:  You may eat any foods that you can tolerate. It is a good idea to eat a high fiber diet and take in plenty of fluids to prevent constipation. If you do become constipated you may want to take a mild laxative or take ducolax tablets on a daily basis until your bowel habits are regular. Constipation can be very uncomfortable, along with straining, after recent abdominal surgery. ACTIVITY:  You are encouraged to cough and deep breath or use your incentive spirometer if you were given one, every 15-30 minutes when awake. This will help prevent respiratory complications and low grade fevers post-operatively. You may want to hug a pillow when coughing and sneezing to add additional support to the surgical area(s) which will decrease pain during these times. You are encouraged to walk and engage in light activity for the next two weeks. You should not lift more than 20 pounds during this time frame as it could put you at increased risk for a post-operative hernia. Twenty pounds is roughly equivalent to a plastic bag of groceries. MEDICATIONS:  Try to take narcotic medications and anti-inflammatory medications, such as tylenol, ibuprofen, naprosyn, etc., with food. This will minimize stomach upset from the medication. Should you develop nausea and vomiting from the pain medication, or develop a rash, please discontinue the medication and contact your physician. You should not drive, make important decisions, or operate machinery when taking narcotic pain medication. QUESTIONS:  Please feel free to call your physician or the hospital  if you have any questions, and they will be glad to assist you.

## 2020-02-17 NOTE — PROGRESS NOTES
Spiritual Care Partner Volunteer visited patient on the [facility] on 2/17/20. Documented by:  [employer] Leif Otero.      Paging Service: [phone])

## 2020-02-17 NOTE — TELEPHONE ENCOUNTER
Shadia Brenner w/ [facility]  called to schedule a [facility] visit for patient, patient has anappt 02/21 that was r's from 02/19

## 2020-02-17 NOTE — OP NOTES
[facility]  OPERATIVE REPORT    Name:  Gen Marie  MR#:  [id number]  :  1929  ACCOUNT #:  [de-identified]  DATE OF SERVICE:  2020    PREOPERATIVE DIAGNOSIS:  Acute cholecystitis. POSTOPERATIVE DIAGNOSIS:  Acute cholecystitis. PROCEDURE PERFORMED:  Laparoscopic cholecystectomy. SURGEON:  Mike Chamberlain. Lona Catalan MD    ASSISTANT:  Eric Musa SA    ANESTHESIA:  General endotracheal.    COMPLICATIONS:  None. SPECIMENS REMOVED:  Gallbladder. IMPLANTS:  None. ESTIMATED BLOOD LOSS:  Minimal.    FINDINGS:  Dead gallbladder. INDICATIONS FOR OPERATION:  The patient is a 59-year-old female who was recently admitted with acute cholecystitis. Her INR has now normalized and after discussing the risks and benefits of laparoscopic cholecystectomy with intraoperative cholangiogram to both the patient and her family, all have agreed to proceed. DESCRIPTION OF PROCEDURE:  The patient was brought to the operating room and placed supine on the table. SCDs were placed on her bilateral lower extremities. General endotracheal anesthesia was administered without difficulty. The abdomen was prepped and draped in the usual sterile fashion. IV Ancef was given. After a timeout was performed, a supraumbilical incision was made and a Veress needle was used to gain access into the peritoneal cavity. The abdomen was then insufflated with carbon dioxide gas and 12-mm blunt-tipped trocar was placed. A laparoscope was introduced. The abdominal cavity was surveyed and no evidence of bowel injury. Two more 5-mm blunt-tipped trocars were then placed, one in the epigastrium and one in the right upper quadrant. The patient was placed in the reversed Trendelenburg position with the right side facing up. The gallbladder was very large and very necrotic-appearing, almost pedunculated off of the edge of the liver bed and very mobile.   The structure was dissected around that appeared to be a cystic artery as well as a cystic duct. Due to the very delicate tissue that was quite friable, there was no way to do a cholangiogram.  Two clips were placed on the cystic duct and it was divided. Three clips were placed in the cystic artery and it was divided. The gallbladder was then removed from the attachments to the edge of the liver using electrocautery. It was placed in an EndoCatch bag and removed through the umbilical incision. There was no bleeding on the liver bed and bilious fluid that was in the right upper quadrant prior to the surgery was all suctioned from the abdomen. At that point, the trocars were removed and the fascia of the umbilicus was closed with two figure-of-eight 0 Vicryl sutures. All skin incisions were then closed with 4-0 Monocryl subcuticular sutures and Dermabond. All sponge, needle, instrument counts at the end of the case were correct. The patient was allowed to awaken from anesthetic and transferred to the recovery room in good condition.       Beatrice Webb MD [id number]/  D:  02/16/2020 13:16  T:  02/16/2020 22:40  JOB #:  [id number]

## 2020-02-17 NOTE — PROGRESS NOTES
Bedside shift change report given to Siri (oncoming nurse) by Master Landin (offgoing nurse). Report included the following information SBAR, Kardex, Intake/Output and Recent Results. Discharge medications reviewed with patient and appropriate educational materials and side effects teaching were provided. I have reviewed discharge instructions with the patient. The patient verbalized understanding.

## 2020-02-17 NOTE — PROGRESS NOTES
2/17/20204:34 PM Met with pt again to discuss discharge. Pt was agreeable to discharge today. Pt reported her daughter will transport her home tonight. CM discussed PCP appt with pt again, pt requested an afternoon appt. CM called back to Dr. Leonor Arriaza office, rescheduled appt for 2/24 at [facility]. Pt is aware. CM discussed home health with pt, choice of home health agency provided to pt. Pt selected [address] [place] [address] [place] Referral sent and accepted by [address] [place], [place] [address] [facility] is aware pt will discharge today. Care Management Interventions  PCP Verified by CM: Yes  Mode of Transport at Discharge: Other (see comment)(Family)  Transition of Care Consult (CM Consult): [address]: Yes  MyChart Signup: No  Discharge Durable Medical Equipment: No  Physical Therapy Consult: No  Occupational Therapy Consult: No  Speech Therapy Consult: No  Current Support Network: Relative's Home  Confirm Follow Up Transport: Family  The Patient and/or Patient Representative was Provided with a Choice of Provider and Agrees with the Discharge Plan?: Yes  Freedom of Choice List was Provided with Basic Dialogue that Supports the Patient's Individualized Plan of Care/Goals, Treatment Preferences and Shares the Quality Data Associated with the Providers?: Yes  Discharge Location  Discharge Placement: Home with home health      2/17/2020  3:46 PM EMR reviewed, pt to restart coumadin and see her PCP later this week. Discharge order noted. Met with pt to discuss, pt reported she was told by Dr. Chirag Osorio she would not discharge till tomorrow. Message sent to Dr. Chirag Osorio to clarify discharge. CM called and scheduled pt's PCP appt for INR recheck on 2/21 at 10:00am. Appt added to pt's avs.    2/17/2020 2:32 PM Consult for lovenox bridge received. Spoke with ordering physician who reported he will follow up with CM on 2/18 after speaking with pt's PCP about pt's possible need for lovenox.  CM requested script to send to pt's pharmacy from pt's attending if pt does go home on lovenox. CM will follow up.  SERENA TreadwellW

## 2020-02-18 ENCOUNTER — PATIENT OUTREACH (OUTPATIENT)
Dept: INTERNAL MEDICINE CLINIC | Age: 85
End: 2020-02-18

## 2020-02-18 ENCOUNTER — HOME CARE VISIT (OUTPATIENT)
Dept: SCHEDULING | Facility: HOME HEALTH | Age: 85
End: 2020-02-18

## 2020-02-18 NOTE — PROGRESS NOTES
Hospital Discharge Follow-Up      Date/Time:  2020 1:37 PM  Dionna Stover RN  Care Transitions Nurse  [phone]      Patient was admitted to [facility] on 2020 and discharged on 2020 for cholecystitis s/p lap arlene. The physician discharge summary was available at the time of outreach. Patient was contacted within one business day of discharge. Top Challenges reviewed with the provider   On Coumadin, next INR check during PCP OV  Plans to schedule follow up appt with surgeon tomorrow    Advance Care Planning:   Does patient have an Advance Directive:  reviewed and current        Method of communication with provider :none    Was this a readmission? no       Care Transition Nurse (CTN) contacted the patient by telephone to perform post hospital discharge assessment. Verified name and  with patient as identifiers. Provided introduction to self, and explanation of the CTN role. Reports soreness to surgical site. No pain. No infection. Denies fever, chest pain, shortness of breath. Tolerating diet. Reports BM today. No bladder concerns. No fever. Patient received hospital discharge instructions. CTN reviewed discharge instructions and red flags with patient who verbalized understanding. Patient given an opportunity to ask questions and does not have any further questions or concerns at this time. The patient agrees to contact the PCP office for questions related to their healthcare. CTN provided contact information for future reference.     Disease Specific:   N/A    Patients top risk factors for readmission:  transportation    Home Health orders at discharge: [address]: [address]  Date of initial visit: 2020    Durable Medical Equipment ordered at discharge: none    Medication(s):   New Medications at Discharge: NA  Changed Medications at Discharge: NA  Discontinued Medications at Discharge: Macrodantin 50 mg capsule    Medication reconciliation was performed with patient, who verbalizes understanding of administration of home medications. There were no barriers to obtaining medications identified at this time. Referral to Pharm D needed: no     Current Outpatient Medications   Medication Sig    diclofenac (VOLTAREN) 1 % gel Apply 2 g to affected area nightly. Apply to shoulder    fluticasone propionate (FLONASE ALLERGY RELIEF) 50 mcg/actuation nasal spray 2 Sprays by Both Nostrils route daily as needed for Rhinitis or Allergies.  polyethylene glycol (MIRALAX) 17 gram/dose powder Take 17 g by mouth nightly.  methocarbamol (ROBAXIN) 500 mg tablet Take 500 mg by mouth three (3) times daily as needed for Muscle Spasm(s).  folic acid/multivit-min/lutein (CENTRUM SILVER PO) Take 1 Tab by mouth daily.  cholecalciferol (VITAMIN D3) (1000 Units /25 mcg) tablet Take 1,000 Units by mouth daily.  cyanocobalamin 1,000 mcg tablet Take 1,000 mcg by mouth daily.  ALPRAZolam (XANAX) 0.25 mg tablet Take 0.25 mg by mouth daily as needed for Anxiety.  warfarin (JANTOVEN) 5 mg tablet TAKE ONE TABLET BY MOUTH ONE TIME DAILY    amLODIPine (NORVASC) 5 mg tablet TAKE ONE TABLET BY MOUTH ONE TIME DAILY    celecoxib (CELEBREX) 200 mg capsule TAKE ONE CAPSULE BY MOUTH ONE TIME DAILY AS NEEDED    lisinopril (PRINIVIL, ZESTRIL) 10 mg tablet TAKE ONE TABLET BY MOUTH ONE TIME DAILY    bisoprolol-hydroCHLOROthiazide (ZIAC) 10-6.25 mg per tablet TAKE ONE TABLET BY MOUTH ONE TIME DAILY     No current facility-administered medications for this visit. There are no discontinued medications.     [facility] follow up appointment(s):   Future Appointments   Date Time Provider Department Center   2/19/2020 To Be Determined Gabrielle Weaver [address]   2/24/2020  3:00 PM Jagruti Bazzi MD [address]   4/30/2020  2:45 PM MD Inderjit Casas [address] [address]      Non-[facility] follow up appointment(s): plans to schedule follow up appt with surgeon tomorrow  [facility]:  n/a       Goals      Attend follow up appointments on schedule      Understands red flags post discharge.

## 2020-02-18 NOTE — DISCHARGE SUMMARY
Discharge Summary    Patient: Trever Pressley               Sex: female          DOA: 2/14/2020  8:25 AM       YOB: 1929      Age:  80 y.o.        LOS:  LOS: 2 days                Discharge Date:  2/17/2020    Admission Diagnoses: Cholecystitis [K81.9]  Acute cholecystitis [K81.0]    Discharge Diagnoses:  Same    Procedure:  Procedure(s):  CHOLECYSTECTOMY LAPAROSCOPIC    Discharge Condition: Good    Hospital Course: Unremarkable operative procedure. Discharge to home in stable condition. Patient was told to follow up with [facility] this week to recheck INR. Consults: Hospitalist    Significant Diagnostic Studies: See full electronic record. Discharge Medications:  See full electronic record. Activity/Diet/Wound Care: See patient administered discharge instructions.     Follow-up: 2 weeks    Claudetta Hench, MD  [address] [place] [address]  Office:  [phone]  Fax:  [phone]

## 2020-02-19 ENCOUNTER — HOME CARE VISIT (OUTPATIENT)
Dept: SCHEDULING | Facility: HOME HEALTH | Age: 85
End: 2020-02-19

## 2020-02-24 ENCOUNTER — OFFICE VISIT (OUTPATIENT)
Dept: INTERNAL MEDICINE CLINIC | Age: 85
End: 2020-02-24

## 2020-02-24 VITALS
RESPIRATION RATE: 16 BRPM | HEART RATE: 88 BPM | WEIGHT: 123 LBS | SYSTOLIC BLOOD PRESSURE: 112 MMHG | BODY MASS INDEX: 19.77 KG/M2 | OXYGEN SATURATION: 99 % | TEMPERATURE: 97.7 F | HEIGHT: 66 IN | DIASTOLIC BLOOD PRESSURE: 80 MMHG

## 2020-02-24 DIAGNOSIS — Z90.49 S/P CHOLECYSTECTOMY: ICD-10-CM

## 2020-02-24 DIAGNOSIS — I48.20 CHRONIC ATRIAL FIBRILLATION (HCC): ICD-10-CM

## 2020-02-24 DIAGNOSIS — K81.0 ACUTE CHOLECYSTITIS: ICD-10-CM

## 2020-02-24 DIAGNOSIS — Z09 HOSPITAL DISCHARGE FOLLOW-UP: Primary | ICD-10-CM

## 2020-02-24 LAB
INR BLD: 2
PT POC: 23.4 SECONDS
VALID INTERNAL CONTROL?: YES

## 2020-02-24 NOTE — PATIENT INSTRUCTIONS
Hold lisinopril for 7-10 days, resume if systolic blood pressure remains >130. Cholecystectomy: What to Expect at [facility] Your Recovery After your surgery, it is normal to feel weak and tired for several days after you return home. Your belly may be swollen. If you had laparoscopic surgery, you may also have pain in your shoulder for about 24 hours. You may have gas or need to burp a lot at first, and a few people get diarrhea. The diarrhea usually goes away in 2 to 4 weeks, but it may last longer. How quickly you recover depends on whether you had a laparoscopic or open surgery. · For a laparoscopic surgery, most people can go back to work or their normal routine in 1 to 2 weeks, but it may take longer, depending on the type of work you do. · For an open surgery, it will probably take 4 to 6 weeks before you get back to your normal routine. This care sheet gives you a general idea about how long it will take for you to recover. However, each person recovers at a different pace. Follow the steps below to get better as quickly as possible. How can you care for yourself at home? Activity 
  · Rest when you feel tired. Getting enough sleep will help you recover.  
  · Try to walk each day. Start out by walking a little more than you did the day before. Gradually increase the amount you walk. Walking boosts blood flow and helps prevent pneumonia and constipation.  
  · For about 2 to 4 weeks, avoid lifting anything that would make you strain. This may include a child, heavy grocery bags and milk containers, a heavy briefcase or backpack, cat litter or dog food bags, or a vacuum .  
  · Avoid strenuous activities, such as biking, jogging, weightlifting, and aerobic exercise, until your doctor says it is okay.  
  · You may shower 24 to 48 hours after surgery, if your doctor okays it. Pat the cut (incision) dry. Do not take a bath for the first 2 weeks, or until your doctor tells you it is okay.   · You may drive when you are no longer taking pain medicine and can quickly move your foot from the gas pedal to the brake. You must also be able to sit comfortably for a long period of time, even if you do not plan to go far. You might get caught in traffic.  
  · For a laparoscopic surgery, most people can go back to work or their normal routine in 1 to 2 weeks, but it may take longer. For an open surgery, it will probably take 4 to 6 weeks before you get back to your normal routine.  
  · Your doctor will tell you when you can have sex again.  
 Diet 
  · Eat smaller meals more often instead of fewer larger meals. You can eat a normal diet, but avoid eating fatty foods for about 1 month. Fatty foods include hamburger, whole milk, cheese, and many snack foods. If your stomach is upset, try bland, low-fat foods like plain rice, broiled chicken, toast, and yogurt.  
  · Drink plenty of fluids (unless your doctor tells you not to).   · If you have diarrhea, try avoiding spicy foods, dairy products, fatty foods, and alcohol. You can also watch to see if specific foods cause it, and stop eating them. If the diarrhea continues for more than 2 weeks, talk to your doctor.  
  · You may notice that your bowel movements are not regular right after your surgery. This is common. Try to avoid constipation and straining with bowel movements. You may want to take a fiber supplement every day. If you have not had a bowel movement after a couple of days, ask your doctor about taking a mild laxative. Medicines 
  · Your doctor will tell you if and when you can restart your medicines. He or she will also give you instructions about taking any new medicines.  
  · If you take blood thinners, such as warfarin (Coumadin), clopidogrel (Plavix), or aspirin, be sure to talk to your doctor. He or she will tell you if and when to start taking those medicines again.  Make sure that you understand exactly what your doctor wants you to do.  
  · Take pain medicines exactly as directed. ? If the doctor gave you a prescription medicine for pain, take it as prescribed. ? If you are not taking a prescription pain medicine, take an over-the-counter medicine such as acetaminophen (Tylenol), ibuprofen (Advil, Motrin), or naproxen (Aleve). Read and follow all instructions on the label. ? Do not take two or more pain medicines at the same time unless the doctor told you to. Many pain medicines contain acetaminophen, which is Tylenol. Too much Tylenol can be harmful.  
  · If you think your pain medicine is making you sick to your stomach: 
? Take your medicine after meals (unless your doctor tells you not to). ? Ask your doctor for a different pain medicine.  
  · If your doctor prescribed antibiotics, take them as directed. Do not stop taking them just because you feel better. You need to take the full course of antibiotics. Incision care 
  · If you have strips of tape on the incision, or cut, leave the tape on for a week or until it falls off.  
  · After 24 to 48 hours, wash the area daily with warm, soapy water, and pat it dry.  
  · You may have staples to hold the cut together. Keep them dry until your doctor takes them out. This is usually in 7 to 10 days.  
  · Keep the area clean and dry. You may cover it with a gauze bandage if it weeps or rubs against clothing. Change the bandage every day.  
 Ice 
  · To reduce swelling and pain, put ice or a cold pack on your belly for 10 to 20 minutes at a time. Do this every 1 to 2 hours. Put a thin cloth between the ice and your skin. Follow-up care is a key part of your treatment and safety. Be sure to make and go to all appointments, and call your doctor if you are having problems. It's also a good idea to know your test results and keep a list of the medicines you take. When should you call for help? Call 911 anytime you think you may need emergency care. For example, call if: 
  · You passed out (lost consciousness).  
  · You are short of breath. Blair Hines your doctor now or seek immediate medical care if: 
  · You are sick to your stomach and cannot drink fluids.  
  · You have pain that does not get better when you take your pain medicine.  
  · You cannot pass stools or gas.  
  · You have signs of infection, such as: 
? Increased pain, swelling, warmth, or redness. ? Red streaks leading from the incision. ? Pus draining from the incision. ? A fever.  
  · Bright red blood has soaked through the bandage over your incision.  
  · You have loose stitches, or your incision comes open.  
  · You have signs of a blood clot in your leg (called a deep vein thrombosis), such as: 
? Pain in your calf, back of knee, thigh, or groin. ? Redness and swelling in your leg or groin.  
 Watch closely for any changes in your health, and be sure to contact your doctor if you have any problems. Where can you learn more? Go to http://giuseppe-abi.info/. Enter [phone] in the search box to learn more about \"Cholecystectomy: What to Expect at Home. \" Current as of: November 7, 2018 Content Version: 12.2 © 8474-0060 [employer]. Care instructions adapted under license by [employer] (which disclaims liability or warranty for this information). If you have questions about a medical condition or this instruction, always ask your healthcare professional. [place] [address] any warranty or liability for your use of this information.

## 2020-02-24 NOTE — PROGRESS NOTES
Leydi Mcclendon is a 80 y.o. female who presents today for Transitions Of Care  . She has a history of   Patient Active Problem List   Diagnosis Code    HTN (hypertension) I10    IBS (irritable bowel syndrome) K58.9    Cataract H26.9    Diverticulosis K57.90    Chronic atrial fibrillation I48.20    Chronic cystitis N30.20    Chronic anticoagulation Z79.01    Advanced care planning/counseling discussion Z71.89    Breast cancer, left ([address] C50.912    Cholecystitis K81.9    Acute cholecystitis K81.0   . Today patient is here for transition care visit. Kirill [facility] Hospitalization: Patient was hospitalized from February 14 and 17 to the acute cholecystitis. Our nurse navigator has been in touch with patient has facilitated Transition of care visit. Patient was placed on IV antibiotics and monitored and ended up having a laparoscopic cholecystectomy on the 16th. Pt notes that Thursday night acutely began having nausea and one episode of vomiting. Since being home patient notes that she has been doing well. Patient did resume her Coumadin after surgery. Hypertension - had one episode of presyncope while walking down the hallway yesterday. Has not been checking her blood pressure at home. She has resumed all her home blood pressure medications. Today it is a bit low. Hypertension ROS: taking medications as instructed, no medication side effects noted, no TIA's, no chest pain on exertion, no dyspnea on exertion, no swelling of ankles     reports that she quit smoking about 56 years ago. She smoked 0.25 packs per day. She has never used smokeless tobacco.    reports current alcohol use. BP Readings from Last 2 Encounters:   02/24/20 112/80   02/17/20 131/76     Afib: Back on her previous dose of Coumadin. ROS  Review of Systems   Constitutional: Negative for chills, fever and weight loss. HENT: Negative for congestion and sore throat.     Eyes: Negative for blurred vision, double vision and photophobia. Respiratory: Negative for cough and shortness of breath. Cardiovascular: Negative for chest pain, palpitations and leg swelling. Gastrointestinal: Negative for abdominal pain, constipation, diarrhea, heartburn, nausea and vomiting. Genitourinary: Negative for dysuria, frequency and urgency. Musculoskeletal: Negative for joint pain and myalgias. Skin: Negative for rash. Neurological: Positive for dizziness. Negative for headaches. Endo/Heme/Allergies: Does not bruise/bleed easily. Psychiatric/Behavioral: Negative for memory loss and suicidal ideas. Visit Vitals  /80 (BP 1 Location: Left arm, BP Patient Position: Sitting)   Pulse 88   Temp 97.7 °F (36.5 °C) (Oral)   Resp 16   Ht 5' 6\" (1.676 m)   Wt 123 lb (55.8 kg)   SpO2 99%   BMI 19.85 kg/m²       Physical Exam  Constitutional:       Appearance: She is well-developed. HENT:      Head: Normocephalic and atraumatic. Cardiovascular:      Rate and Rhythm: Normal rate. Rhythm irregular. Heart sounds: No murmur. Pulmonary:      Effort: Pulmonary effort is normal. No respiratory distress. Abdominal:      Comments: Laparoscopic scars that are healing. No bruising. No surrounding erythema   Skin:     General: Skin is warm and dry. Neurological:      Mental Status: She is alert and oriented to person, place, and time. Psychiatric:         Behavior: Behavior normal.           Current Outpatient Medications   Medication Sig    diclofenac (VOLTAREN) 1 % gel Apply 2 g to affected area nightly. Apply to shoulder    fluticasone propionate (FLONASE ALLERGY RELIEF) 50 mcg/actuation nasal spray 2 Sprays by Both Nostrils route daily as needed for Rhinitis or Allergies.  methocarbamol (ROBAXIN) 500 mg tablet Take 500 mg by mouth three (3) times daily as needed for Muscle Spasm(s).  folic acid/multivit-min/lutein (CENTRUM SILVER PO) Take 1 Tab by mouth daily.     cholecalciferol (VITAMIN D3) (1000 Units /25 mcg) tablet Take 1,000 Units by mouth daily.  cyanocobalamin 1,000 mcg tablet Take 1,000 mcg by mouth daily.  ALPRAZolam (XANAX) 0.25 mg tablet Take 0.25 mg by mouth daily as needed for Anxiety.  warfarin (JANTOVEN) 5 mg tablet TAKE ONE TABLET BY MOUTH ONE TIME DAILY    amLODIPine (NORVASC) 5 mg tablet TAKE ONE TABLET BY MOUTH ONE TIME DAILY    celecoxib (CELEBREX) 200 mg capsule TAKE ONE CAPSULE BY MOUTH ONE TIME DAILY AS NEEDED    lisinopril (PRINIVIL, ZESTRIL) 10 mg tablet TAKE ONE TABLET BY MOUTH ONE TIME DAILY    bisoprolol-hydroCHLOROthiazide (ZIAC) 10-6.25 mg per tablet TAKE ONE TABLET BY MOUTH ONE TIME DAILY    polyethylene glycol (MIRALAX) 17 gram/dose powder Take 17 g by mouth nightly. No current facility-administered medications for this visit.          Past Medical History:   Diagnosis Date    Arrhythmia     A-Fib  on Warfarin    Arthritis     hands and knees bilaterally    Breast cancer ([facility] [address] 2019    left breast    Diverticula, colon     Hypertension     IBS (irritable bowel syndrome)     Irregular heart beat     Psychiatric disorder     some anxiety and depression      Past Surgical History:   Procedure Laterality Date    HX BREAST LUMPECTOMY Left 2019    LEFT BREAST LUMPECTOMY AND LEFT BREAST SENTINEL NODE BIOPSY WIHT BLUE DYE performed by Jae Cleveland MD at [address] [place] [address] CATARACT REMOVAL Left     cataract    HX CATARACT REMOVAL Right     cataract    HX  SECTION      X 3    HX CHOLECYSTECTOMY      HX COLONOSCOPY  8/28/15    with endoscopy day before    HX HYSTERECTOMY      partial    NEUROLOGICAL PROCEDURE UNLISTED      lumbar surgery       Social History     Tobacco Use    Smoking status: Former Smoker     Packs/day: 0.25     Last attempt to quit: 1964     Years since quittin.1    Smokeless tobacco: Never Used   Substance Use Topics    Alcohol use: Yes     Comment: glass of wine every other month, holidays      Family History   Problem Relation Age of Onset    Tuberculosis Mother         Allergies   Allergen Reactions    Pcn [Penicillins] Hives        Assessment/Plan  Diagnoses and all orders for this visit:    1. Hospital discharge follow-up-nurse navigator help to arrange this appointment. Overall patient is improving. She has tolerated a diet and is slowly advancing it. Only issue is been some mild presyncope. I will have her hold her lisinopril over the next week to 10 days and have her monitor blood pressure. Once this blood pressure rises above 130 she will resume her lisinopril. We will continue as planned previously to get her blood work done in mid March and keep her follow-up with me in April    2. Chronic atrial fibrillation  -     AMB POC PT/INR    3. S/P cholecystectomy    4. Acute cholecystitis            Tavo Childs MD  2/24/2020    This note was created with the help of speech recognition software Tony Lozano) and may contain some 'sound alike' errors.

## 2020-02-26 ENCOUNTER — PATIENT OUTREACH (OUTPATIENT)
Dept: INTERNAL MEDICINE CLINIC | Age: 85
End: 2020-02-26

## 2020-03-04 ENCOUNTER — TELEPHONE (OUTPATIENT)
Dept: INTERNAL MEDICINE CLINIC | Age: 85
End: 2020-03-04

## 2020-03-04 NOTE — TELEPHONE ENCOUNTER
----- Message from Jarad Strong sent at 3/4/2020  3:08 PM EST -----  Regarding: Dr. Daniel Hare: [phone]  Caller's first and last name: n/a  Reason for call: Medication inquiry  Callback required yes/no and why: yes  Best contact number(s): [phone]  Details to clarify the request: \"Macrodantin\" medication stopped on the 17th and Pt is calling to find out when she will continue again

## 2020-03-05 NOTE — TELEPHONE ENCOUNTER
See phone note from 27 February.   I suggest that she stays off of it and only resume it if she has recurrent UTIs

## 2020-03-20 DIAGNOSIS — I10 ESSENTIAL HYPERTENSION: ICD-10-CM

## 2020-03-20 DIAGNOSIS — I48.91 ATRIAL FIBRILLATION, UNSPECIFIED TYPE (HCC): ICD-10-CM

## 2020-03-20 DIAGNOSIS — Z79.01 CHRONIC ANTICOAGULATION: ICD-10-CM

## 2020-03-24 RX ORDER — BISOPROLOL FUMARATE AND HYDROCHLOROTHIAZIDE 10; 6.25 MG/1; MG/1
TABLET ORAL
Qty: 90 TAB | Refills: 1 | Status: SHIPPED | OUTPATIENT
Start: 2020-03-24 | End: 2020-03-26

## 2020-03-26 DIAGNOSIS — I10 ESSENTIAL HYPERTENSION: ICD-10-CM

## 2020-03-26 RX ORDER — BISOPROLOL FUMARATE AND HYDROCHLOROTHIAZIDE 10; 6.25 MG/1; MG/1
TABLET ORAL
Qty: 90 TAB | Refills: 1 | Status: SHIPPED | OUTPATIENT
Start: 2020-03-26 | End: 2020-09-25

## 2020-03-26 RX ORDER — AMLODIPINE BESYLATE 5 MG/1
TABLET ORAL
Qty: 90 TAB | Refills: 1 | Status: SHIPPED | OUTPATIENT
Start: 2020-03-26 | End: 2020-06-24

## 2020-04-15 ENCOUNTER — VIRTUAL VISIT (OUTPATIENT)
Dept: INTERNAL MEDICINE CLINIC | Age: 85
End: 2020-04-15

## 2020-04-15 ENCOUNTER — TELEPHONE (OUTPATIENT)
Dept: INTERNAL MEDICINE CLINIC | Age: 85
End: 2020-04-15

## 2020-04-15 VITALS
TEMPERATURE: 95.7 F | HEIGHT: 66 IN | BODY MASS INDEX: 19.77 KG/M2 | WEIGHT: 123 LBS | DIASTOLIC BLOOD PRESSURE: 76 MMHG | HEART RATE: 75 BPM | SYSTOLIC BLOOD PRESSURE: 131 MMHG

## 2020-04-15 DIAGNOSIS — I10 ESSENTIAL HYPERTENSION: ICD-10-CM

## 2020-04-15 DIAGNOSIS — N30.00 ACUTE CYSTITIS WITHOUT HEMATURIA: Primary | ICD-10-CM

## 2020-04-15 DIAGNOSIS — N39.0 RECURRENT UTI: ICD-10-CM

## 2020-04-15 RX ORDER — NITROFURANTOIN (MACROCRYSTALS) 100 MG/1
100 CAPSULE ORAL 2 TIMES DAILY
Qty: 10 CAP | Refills: 0 | Status: SHIPPED | OUTPATIENT
Start: 2020-04-15 | End: 2020-04-20

## 2020-04-15 RX ORDER — NITROFURANTOIN MACROCRYSTALS 50 MG/1
50 CAPSULE ORAL DAILY
Qty: 90 CAP | Refills: 1 | Status: SHIPPED | OUTPATIENT
Start: 2020-04-15 | End: 2020-11-04

## 2020-04-15 NOTE — TELEPHONE ENCOUNTER
----- Message from Leeanne Mccrayr sent at 4/15/2020  9:54 AM EDT -----  Regarding: Dr. Sharmaine Hernandez telephone  Contact: [phone]  Caller's first and last name: n/a  Reason for call: UTI  Pt would like medication. Callback required yes/no and why: yes  Best contact number(s):[phone]  Details to clarify the request: Pt says Dr. Sherrie Flynn will know what's going on with her.

## 2020-04-15 NOTE — TELEPHONE ENCOUNTER
Pt was instructed to stop nitrofurantoin earlier this year d/t surgery. Pt c/o urinary burning, frequency and abdominal cramping. Pt would like to have Rx sent to pharmacy. Pt is able to schedule VV if needed.

## 2020-04-15 NOTE — PROGRESS NOTES
Abrahan Khalil was seen on 4/15/2020 using synchronous (real-time) audio-video technology; doxy. me. Consent:  Patient and/or healthcare decision maker is aware that this patient-initiated Telehealth encounter is a billable service, with coverage as determined by their insurance carrier. Patient is aware that they may receive a bill and has provided verbal consent to proceed: Yes     I was in the office while conducting this encounter. Abrahan Khalil is a 80 y.o. female who presents today for Urinary Frequency and Urinary Pain  . She has a history of   Patient Active Problem List   Diagnosis Code    HTN (hypertension) I10    IBS (irritable bowel syndrome) K58.9    Cataract H26.9    Diverticulosis K57.90    Chronic atrial fibrillation I48.20    Chronic cystitis N30.20    Chronic anticoagulation Z79.01    Advanced care planning/counseling discussion Z71.89    Breast cancer, left ([facility] [address] C50.912    Cholecystitis K81.9    Acute cholecystitis K81.0    S/P cholecystectomy Z90.49   . Today patient is being seen for urinary concerns. .     Urinary Problems: Abrahan Khalil is a 80 y.o. female who complains of dysuria, frequency/urgency x 7 days, without flank pain, fever, chills, nausea or vomiting. Urine has been cloudy/foul smelling.  her symptoms are moderate. she is drinking plenty of fluids for hydration. her history is significant for: Recurrent UTIs. Previously she was on nitrofurantoin 50 mg daily. We will treat her for her UTI and then put her back on prophylactic dose. Denies any fevers or chills. Denies any other systemic signs of infection. Denies any CVA tenderness. reports never being sexually active. .    No LMP recorded. Patient has had a hysterectomy. HTN: Pressure initially elevated. This is normally well controlled. ROS  Review of Systems   Constitutional: Negative for chills, fever and weight loss.    Respiratory: Negative for cough and shortness of breath. Cardiovascular: Negative for chest pain, palpitations and leg swelling. Gastrointestinal: Negative for abdominal pain, nausea and vomiting. Genitourinary: Positive for dysuria, frequency and urgency. Negative for flank pain and hematuria. Musculoskeletal: Negative for back pain, joint pain, myalgias and neck pain. Neurological: Negative. Endo/Heme/Allergies: Does not bruise/bleed easily. Psychiatric/Behavioral: Negative for depression. The patient is not nervous/anxious. Visit Vitals  /76   Pulse 75   Temp 95.7 °F (35.4 °C) (Oral)   Ht 5' 6\" (1.676 m)   Wt 123 lb (55.8 kg)   BMI 19.85 kg/m²       Physical Exam  Constitutional:       Appearance: Normal appearance. HENT:      Head: Normocephalic and atraumatic. Pulmonary:      Effort: Pulmonary effort is normal. No respiratory distress. Comments: Speaking in full sentences. Neurological:      General: No focal deficit present. Mental Status: She is alert. Psychiatric:         Mood and Affect: Mood normal.         Behavior: Behavior normal.           Current Outpatient Medications   Medication Sig    nitrofurantoin (MACRODANTIN) 50 mg capsule Take 1 Cap by mouth daily. Start after treatment dose for UTI.  nitrofurantoin (MACRODANTIN) 100 mg capsule Take 1 Cap by mouth two (2) times a day for 5 days.  bisoprolol-hydroCHLOROthiazide (ZIAC) 10-6.25 mg per tablet TAKE ONE TABLET BY MOUTH ONE TIME DAILY    amLODIPine (NORVASC) 5 mg tablet TAKE ONE TABLET BY MOUTH ONE TIME DAILY    diclofenac (VOLTAREN) 1 % gel Apply 2 g to affected area nightly. Apply to shoulder    fluticasone propionate (FLONASE ALLERGY RELIEF) 50 mcg/actuation nasal spray 2 Sprays by Both Nostrils route daily as needed for Rhinitis or Allergies.  polyethylene glycol (MIRALAX) 17 gram/dose powder Take 17 g by mouth nightly.  methocarbamol (ROBAXIN) 500 mg tablet Take 500 mg by mouth three (3) times daily as needed for Muscle Spasm(s).  folic acid/multivit-min/lutein (CENTRUM SILVER PO) Take 1 Tab by mouth daily.  cholecalciferol (VITAMIN D3) (1000 Units /25 mcg) tablet Take 1,000 Units by mouth daily.  cyanocobalamin 1,000 mcg tablet Take 1,000 mcg by mouth daily.  ALPRAZolam (XANAX) 0.25 mg tablet Take 0.25 mg by mouth daily as needed for Anxiety.  warfarin (JANTOVEN) 5 mg tablet TAKE ONE TABLET BY MOUTH ONE TIME DAILY    celecoxib (CELEBREX) 200 mg capsule TAKE ONE CAPSULE BY MOUTH ONE TIME DAILY AS NEEDED    lisinopril (PRINIVIL, ZESTRIL) 10 mg tablet TAKE ONE TABLET BY MOUTH ONE TIME DAILY     No current facility-administered medications for this visit.          Past Medical History:   Diagnosis Date    Arrhythmia     A-Fib  on Warfarin    Arthritis     hands and knees bilaterally    Breast cancer ([facility] [address] 2019    left breast    Diverticula, colon     Hypertension     IBS (irritable bowel syndrome)     Irregular heart beat     Psychiatric disorder     some anxiety and depression      Past Surgical History:   Procedure Laterality Date    HX BREAST LUMPECTOMY Left 2019    LEFT BREAST LUMPECTOMY AND LEFT BREAST SENTINEL NODE BIOPSY WIHT BLUE DYE performed by Karlo Elliott MD at [address] HX CATARACT REMOVAL Left     cataract    HX CATARACT REMOVAL Right     cataract    HX  SECTION      X 3    HX CHOLECYSTECTOMY      HX COLONOSCOPY  8/28/15    with endoscopy day before    HX HYSTERECTOMY      partial    NEUROLOGICAL PROCEDURE UNLISTED      lumbar surgery       Social History     Tobacco Use    Smoking status: Former Smoker     Packs/day: 0.25     Last attempt to quit: 1964     Years since quittin.3    Smokeless tobacco: Never Used   Substance Use Topics    Alcohol use: Yes     Comment: glass of wine every other month, holidays      Family History   Problem Relation Age of Onset    Tuberculosis Mother         Allergies   Allergen Reactions    Pcn [Penicillins] Hives        Assessment/Plan  Diagnoses and all orders for this visit:    1. Acute cystitis without hematuria-we will treat patient for UTI and then put her back on prophylactic dose that she is only been off this for less than 2 months. She does have a history of recurrent UTIs. -     nitrofurantoin (MACRODANTIN) 100 mg capsule; Take 1 Cap by mouth two (2) times a day for 5 days. 2. Recurrent UTI  -     nitrofurantoin (MACRODANTIN) 50 mg capsule; Take 1 Cap by mouth daily. Start after treatment dose for UTI. 3. Essential hypertension-initially elevated. Historically this is been well controlled. Ashwin Robert is a 80 y.o. female being evaluated by a video visit encounter for concerns as above. A caregiver was present when appropriate. Due to this being a TeleHealth encounter (During [facility]-29 public health emergency), evaluation of the following organ systems was limited: Vitals/Constitutional/EENT/Resp/CV/GI//MS/Neuro/Skin/Heme-Lymph-Imm. Pursuant to the emergency declaration under the [facility]5 waiver authority and the [employer] Act, this Virtual  Visit was conducted, with patient's (and/or legal guardian's) consent, to reduce the patient's risk of exposure to COVID-19 and provide necessary medical care. Services were provided through a video synchronous discussion virtually to substitute for in-person clinic visit. Patient and provider were located at their individual homes. Ligia Max MD  4/15/2020    This note was created with the help of speech recognition software Trinity Dykes) and may contain some 'sound alike' errors.

## 2020-06-02 RX ORDER — DICLOFENAC SODIUM 10 MG/G
GEL TOPICAL
Qty: 100 G | Refills: 3 | Status: SHIPPED | OUTPATIENT
Start: 2020-06-02

## 2020-06-11 RX ORDER — LISINOPRIL 10 MG/1
TABLET ORAL
Qty: 90 TAB | Refills: 1 | Status: SHIPPED | OUTPATIENT
Start: 2020-06-11 | End: 2020-12-11

## 2020-06-24 DIAGNOSIS — I10 ESSENTIAL HYPERTENSION: ICD-10-CM

## 2020-06-24 RX ORDER — AMLODIPINE BESYLATE 5 MG/1
TABLET ORAL
Qty: 90 TAB | Refills: 1 | Status: SHIPPED | OUTPATIENT
Start: 2020-06-24 | End: 2020-06-25

## 2020-06-25 DIAGNOSIS — I10 ESSENTIAL HYPERTENSION: ICD-10-CM

## 2020-06-25 RX ORDER — AMLODIPINE BESYLATE 5 MG/1
TABLET ORAL
Qty: 90 TAB | Refills: 1 | Status: SHIPPED | OUTPATIENT
Start: 2020-06-25 | End: 2020-12-22

## 2020-07-24 NOTE — PROGRESS NOTES
Primary Nurse Mark Anthony Pace and Nayana Hollis, RN performed a dual skin assessment on this patient No impairment noted  Amado score is 23 85

## 2020-08-06 DIAGNOSIS — Z51.81 ANTICOAGULATION GOAL OF INR 2 TO 3: ICD-10-CM

## 2020-08-06 DIAGNOSIS — D68.9 COAGULATION DISORDER (HCC): ICD-10-CM

## 2020-08-06 DIAGNOSIS — Z79.01 ANTICOAGULATION GOAL OF INR 2 TO 3: ICD-10-CM

## 2020-08-06 RX ORDER — WARFARIN SODIUM 5 MG/1
TABLET ORAL
Qty: 90 TAB | Refills: 1 | Status: SHIPPED | OUTPATIENT
Start: 2020-08-06 | End: 2021-01-28

## 2020-09-25 RX ORDER — BISOPROLOL FUMARATE AND HYDROCHLOROTHIAZIDE 10; 6.25 MG/1; MG/1
TABLET ORAL
Qty: 90 TAB | Refills: 1 | Status: SHIPPED | OUTPATIENT
Start: 2020-09-25 | End: 2021-03-23

## 2020-09-29 ENCOUNTER — HOSPITAL ENCOUNTER (OUTPATIENT)
Dept: LAB | Age: 85
Discharge: HOME | End: 2020-09-29
Payer: MEDICARE

## 2020-09-29 ENCOUNTER — OFFICE VISIT (OUTPATIENT)
Dept: INTERNAL MEDICINE CLINIC | Age: 85
End: 2020-09-29
Payer: MEDICARE

## 2020-09-29 VITALS
RESPIRATION RATE: 16 BRPM | HEIGHT: 66 IN | WEIGHT: 123 LBS | OXYGEN SATURATION: 98 % | DIASTOLIC BLOOD PRESSURE: 72 MMHG | BODY MASS INDEX: 19.77 KG/M2 | SYSTOLIC BLOOD PRESSURE: 132 MMHG | HEART RATE: 80 BPM

## 2020-09-29 DIAGNOSIS — I10 ESSENTIAL HYPERTENSION: Primary | ICD-10-CM

## 2020-09-29 DIAGNOSIS — M25.561 CHRONIC PAIN OF BOTH KNEES: ICD-10-CM

## 2020-09-29 DIAGNOSIS — D68.9 COAGULATION DISORDER (HCC): ICD-10-CM

## 2020-09-29 DIAGNOSIS — G89.29 CHRONIC PAIN OF BOTH KNEES: ICD-10-CM

## 2020-09-29 DIAGNOSIS — I48.91 ATRIAL FIBRILLATION, UNSPECIFIED TYPE (HCC): ICD-10-CM

## 2020-09-29 DIAGNOSIS — G47.00 INSOMNIA, UNSPECIFIED TYPE: ICD-10-CM

## 2020-09-29 DIAGNOSIS — M25.562 CHRONIC PAIN OF BOTH KNEES: ICD-10-CM

## 2020-09-29 DIAGNOSIS — K58.9 IRRITABLE BOWEL SYNDROME, UNSPECIFIED TYPE: ICD-10-CM

## 2020-09-29 PROCEDURE — G0463 HOSPITAL OUTPT CLINIC VISIT: HCPCS | Performed by: INTERNAL MEDICINE

## 2020-09-29 PROCEDURE — 36415 COLL VENOUS BLD VENIPUNCTURE: CPT

## 2020-09-29 PROCEDURE — 99214 OFFICE O/P EST MOD 30 MIN: CPT | Performed by: INTERNAL MEDICINE

## 2020-09-29 PROCEDURE — G8420 CALC BMI NORM PARAMETERS: HCPCS | Performed by: INTERNAL MEDICINE

## 2020-09-29 PROCEDURE — 1090F PRES/ABSN URINE INCON ASSESS: CPT | Performed by: INTERNAL MEDICINE

## 2020-09-29 PROCEDURE — G8510 SCR DEP NEG, NO PLAN REQD: HCPCS | Performed by: INTERNAL MEDICINE

## 2020-09-29 PROCEDURE — 1101F PT FALLS ASSESS-DOCD LE1/YR: CPT | Performed by: INTERNAL MEDICINE

## 2020-09-29 PROCEDURE — G8427 DOCREV CUR MEDS BY ELIG CLIN: HCPCS | Performed by: INTERNAL MEDICINE

## 2020-09-29 PROCEDURE — 80048 BASIC METABOLIC PNL TOTAL CA: CPT

## 2020-09-29 PROCEDURE — G8536 NO DOC ELDER MAL SCRN: HCPCS | Performed by: INTERNAL MEDICINE

## 2020-09-29 PROCEDURE — 85610 PROTHROMBIN TIME: CPT

## 2020-09-29 NOTE — PATIENT INSTRUCTIONS
Learning About the Low FODMAP Diet for Irritable Bowel Syndrome (IBS) What is the low-FODMAP diet? A low-FODMAP diet is a way to find out what foods give you digestion problems. You stop eating certain high-FODMAP foods for about 2 months. Then you add them back to see how your body reacts. This is called a \"challenge diet. \" A dietitian or doctor can help you follow this diet. FODMAPs are carbohydrates. They are in many types of foods. FODMAP stands for: · F ermentable. · O ligosaccharides. · D isaccharides. · M onosaccharides. · A nd p olyols. If you have digestive problems, some of these foods can make your symptoms worse. When you are on this diet, you can still eat certain fruits and vegetables. You can also eat certain grains, meats, fish, and lactose-free milks. What is it used for? If you have irritable bowel syndrome (IBS), you can ease your symptoms by not eating some types of foods. Some people also use this diet for inflammatory bowel disease (IBD) or some food intolerances. High-FODMAP foods can be hard to digest. They pull more fluid into your intestines. They are also easily fermented. This can lead to bloating, belly pain, gas, and diarrhea. The low-FODMAP diet can help you figure out what foods to avoid. And it can help you find foods that are easier to digest. 
This diet can help with symptoms of some digestive diseases. But it's not a cure. You will still need to manage your condition. How does it work? You will work with a doctor or dietitian when you start the diet. At first, you won't eat any high-FODMAP foods for a few weeks. Go to www.[employer]. [place] to learn more about this diet. [place]y [place] also find links to an angel for your phone or other device. You'll find low-FODMAP cookbooks there too. After 6 to 8 weeks, you will start to try high-FODMAP foods again. You will add those foods back to your diet, one group at a time.  Your doctor or dietitian will probably have you wait a few days before you add each new group of those foods. Keep a food diary. You can write down the foods you try and note how they make you feel. After a few weeks, you may have a better idea of what foods you should avoid and what foods make you feel your best. 
What are the risks? There is some risk of not getting all of the vitamins and nutrients you need on the low-FODMAP diet. These include: · Folate. · Thiamin. · Vitamin B6. 
· Calcium. · Vitamin D. Your dietitian or doctor can help you find other sources of these if needed. This diet may limit your fiber intake. Try to plan your meals to include other sources of fiber. What foods are on the low-FODMAP diet? Here is a guide to foods that you can eat, plus the foods that you should avoid, when you are on the low-FODMAP diet. Grains Okay to eat: Foods made from grains like arrowroot, buckwheat, corn, millet, and oats. You can also eat potato, quinoa, rice, sorghum, tapioca, and teff. Cereals, pasta, breads, corn tortillas and baked goods made from these grains are also okay. (These grains may be labeled \"gluten-free. \") Avoid: Grains like wheat, barley, and rye. Avoid ingredients such as bulgur, couscous, durum, and semolina. And avoid cereals, breads, and pastas made from these grains. Avoid chickpea, lentil, and pea flour. Proteins Okay to eat: Most meat, fish, and eggs without high-FODMAP sauces. You can have small amounts of almonds or hazelnuts (10 nuts). Macadamia nuts, peanuts, pecans, pine nuts, and walnuts are also okay. You can also eat fuad and pumpkin seeds, tofu, and tempeh. Avoid: Beans, chickpeas, lentils, and soybeans. Avoid pistachio and cashew nuts. And some sausages may have high-FODMAP ingredients. Dairy Okay to eat: Lactose-free dairy milks. Rice milk and almond milk are okay.  So are lactose-free yogurts, kefirs, ice creams, and sorbet from low-FODMAP fruits and sweeteners. (These are often labeled \"lactose-free. \") You can have small amounts (2 Tbsp) of cottage, cream, or ricotta cheese. Hard cheeses like cheddar, Gay, ROSS, and Swiss are okay. So are small amounts (1 oz) of aged or ripened cheeses like Brie, blue, and feta. Avoid: Milk, including cow, goat, and sheep. Avoid condensed or evaporated milk, buttermilk, custard, cream, sour cream, yogurt, and ice cream. Avoid soy milk. (Check sauces for dairy ingredients.) Vegetables Okay to eat: Bamboo shoots, bell peppers, bok marta, up to ½ cup of broccoli or cabbage (red or white), and cucumbers. Eggplant, green beans, lettuce, olives, parsnips, and potatoes are okay to eat. So are pumpkin, rutabaga, seaweed, sprouts, Swiss chard, and spinach. You can eat scallions (green part only) and squash (not butternut). You can eat tomatoes, turnips, watercress, yams, and zucchini. You can also have small amounts of artichoke hearts (from can, 1 oz), carrots, corn (½ cob), and sweet potato (½ cup). Avoid: Artichokes, asparagus, [place] sprouts, natacha cabbage, cauliflower, and celery. And avoid garlic, leeks, mushrooms, okra, onions, scallions (white part), shallots, and peas. Fruits Okay to eat: Bananas, blueberries, cantaloupe, coconut, grapes, and honeydew. Kiwi, alyce, limes, oranges, passion fruit, papaya, and pineapple are also okay. You can eat plantain, raspberries, rhubarb, star fruit, strawberries, tangelo, and tangerine. You can also have small amounts of dried banana chips (up to 10 chips), dried cranberries (1 Tbsp), and shredded coconut (up to ¼ cup). Avoid: Apples, applesauce, apricots, avocados, blackberries, boysenberries, and cherries. Also avoid dates, figs, grapefruit, guava, lychee, and mangoes. Don't eat nectarines, peaches, pears, persimmon, plums, prunes, tamarillo, or watermelon. And limit most canned and dried fruits. Oils, spices, condiments, and sweeteners Okay to eat: Vegetable oils (including garlic infused), butter, ghee, lard, and margarine (no trans fat). You can have most fresh herbs like basil, chives, coriander, sammi, parsley, rosemary and thyme. You can have salt, jams made from low-FODMAP fruits, mayonnaise, and mustard. Soy sauce, hot sauce (no garlic), tamari, and vinegar are also okay. Sweeteners that are okay include sugar (sucrose), powdered (confectioner's) sugar, brown sugar, glucose, and maple syrup. You can also have some artificial sweeteners like aspartame, saccharine, and stevia. Avoid: Chutneys, hummus, jellies, garlic sauces, and gravies made with onion or garlic. Avoid pickles, relish, some salad dressings and soup stocks, salsa, and tomato paste. And avoid sauces and other foods with high fructose corn syrup, honey, molasses, and agave. Avoid artificial sweeteners (isomalt, mannitol, malitol, sorbitol, and xylitol). Avoid corn syrup solids, fructose, fruit juice concentrate, and polydextrose. Other foods and drinks Okay to have: Water, soda water, tonic, soft drinks sweetened with sugar, ½ cup of low-FODMAP fruit juice, and most teas and alcohols. You can also eat foods made with baking powder and soda, cocoa, and gelatin. Avoid: Juices from high-FODMAP fruits and vegetables. And avoid fortified may, chamomile and fennel teas, chicory-based drinks and coffee substitutes, and bouillon cubes. Follow-up care is a key part of your treatment and safety. Be sure to make and go to all appointments, and call your doctor if you are having problems. It's also a good idea to know your test results and keep a list of the medicines you take. Where can you learn more? Go to http://giuseppe-abi.info/ Enter L235 in the search box to learn more about \"Learning About the Low FODMAP Diet for Irritable Bowel Syndrome (IBS). \" 
Current as of: August 22, 2019               Content Version: 12.6 © 1340-8661 [employer], [employer]. Care instructions adapted under license by [employer] (which disclaims liability or warranty for this information). If you have questions about a medical condition or this instruction, always ask your healthcare professional. [address] any warranty or liability for your use of this information.

## 2020-09-29 NOTE — PROGRESS NOTES
Krystina Ricketts is a 80 y.o. female who presents today for Medication Evaluation and Follow-up  . She has a history of   Patient Active Problem List   Diagnosis Code    HTN (hypertension) I10    IBS (irritable bowel syndrome) K58.9    Cataract H26.9    Diverticulosis K57.90    Chronic atrial fibrillation (HCC) I48.20    Chronic cystitis N30.20    Chronic anticoagulation Z79.01    Advanced care planning/counseling discussion Z71.89    Breast cancer, left ([address] C50.912    Cholecystitis K81.9    Acute cholecystitis K81.0    S/P cholecystectomy Z90.49   . Today patient is here for follow-up. Ewa Sierra Having more recent IBS type symptoms. We discussed the low FODMAP diet. We will provide her with this list.    Continues to have insomnia. Notes that she does not require much sleep. This is not affecting her during the day. Hypertension- repeat is much better. Hypertension ROS: taking medications as instructed, no medication side effects noted, no TIA's, no chest pain on exertion, no dyspnea on exertion, no swelling of ankles     reports that she quit smoking about 56 years ago. She smoked 0.25 packs per day. She has never used smokeless tobacco.    reports current alcohol use. BP Readings from Last 2 Encounters:   09/29/20 132/72   04/15/20 131/76     Anticoagulation  Patient here for followup of chronic anticoagulation. Indication: Atrial Fibrillation. Bleeding Signs/Symptoms:  None. Compliance with warfarin:  Yes  INR's are drawn regularly and have been Therapeutic, but has not been done in some time due to current pandemic and not leaving her house. Does take chronic NSAIDs but does understand the risk of bleeding along with this.   Lab Results   Component Value Date/Time    INR 1.2 (H) 02/17/2020 12:00 PM    INR 1.2 (H) 02/16/2020 03:22 AM    INR 1.8 (H) 02/15/2020 01:30 AM    INR POC 2.0 02/24/2020 03:19 PM    Prothrombin time 11.9 (H) 02/17/2020 12:00 PM    Prothrombin time 12.0 (H) 02/16/2020 03:22 AM    Prothrombin time 18.0 (H) 02/15/2020 01:30 AM     Knee Pain: bilateral knee pain. This is getting worse. No falls. ROS  Review of Systems   Constitutional: Negative for chills, fever and weight loss. HENT: Negative for congestion and sore throat. Eyes: Negative for blurred vision, double vision and photophobia. Respiratory: Negative for cough, hemoptysis, sputum production and shortness of breath. Cardiovascular: Negative for chest pain, palpitations, orthopnea, claudication and leg swelling. Gastrointestinal: Negative for abdominal pain, constipation, diarrhea, heartburn, nausea and vomiting. Genitourinary: Negative for dysuria, frequency and urgency. Musculoskeletal: Positive for joint pain. Negative for back pain, falls, myalgias and neck pain. Skin: Negative for rash. Neurological: Negative. Negative for headaches. Endo/Heme/Allergies: Does not bruise/bleed easily. Psychiatric/Behavioral: Negative for memory loss and suicidal ideas. Visit Vitals  /72   Pulse 80   Resp 16   Ht 5' 6\" (1.676 m)   Wt 123 lb (55.8 kg)   SpO2 98%   BMI 19.85 kg/m²       Physical Exam  Constitutional:       Appearance: She is well-developed. HENT:      Head: Normocephalic and atraumatic. Cardiovascular:      Rate and Rhythm: Normal rate. Rhythm irregular. Heart sounds: No murmur. Pulmonary:      Effort: Pulmonary effort is normal. No respiratory distress. Abdominal:      General: Abdomen is flat. Palpations: Abdomen is soft. Musculoskeletal: Normal range of motion. General: No swelling. Skin:     General: Skin is warm and dry. Neurological:      Mental Status: She is alert and oriented to person, place, and time.    Psychiatric:         Behavior: Behavior normal.           Current Outpatient Medications   Medication Sig    bisoprolol-hydroCHLOROthiazide (ZIAC) 10-6.25 mg per tablet TAKE ONE TABLET BY MOUTH ONE TIME DAILY    celecoxib (CELEBREX) 200 mg capsule TAKE ONE CAPSULE BY MOUTH ONE TIME DAILY AS NEEDED    warfarin (Jantoven) 5 mg tablet TAKE ONE TABLET BY MOUTH ONE TIME DAILY    amLODIPine (NORVASC) 5 mg tablet TAKE ONE TABLET BY MOUTH ONE TIME DAILY    Gavilax 17 gram/dose powder FILL CAP TO THE 17G LINE THEN STIR AND DISSOLVE IN ANY 4 TO 8 OUNCES OF BEVERAGE, THEN DRINK BY MOUTH ONE TIME DAILY    lisinopriL (PRINIVIL, ZESTRIL) 10 mg tablet TAKE ONE TABLET BY MOUTH ONE TIME DAILY    diclofenac (VOLTAREN) 1 % gel APPLY 2G APPLY TO AFFECTED AREA 4 TIMES A DAY    nitrofurantoin (MACRODANTIN) 50 mg capsule Take 1 Cap by mouth daily. Start after treatment dose for UTI.  fluticasone propionate (FLONASE ALLERGY RELIEF) 50 mcg/actuation nasal spray 2 Sprays by Both Nostrils route daily as needed for Rhinitis or Allergies.  folic acid/multivit-min/lutein (CENTRUM SILVER PO) Take 1 Tab by mouth daily.  cholecalciferol (VITAMIN D3) (1000 Units /25 mcg) tablet Take 1,000 Units by mouth daily.  cyanocobalamin 1,000 mcg tablet Take 1,000 mcg by mouth daily.  ALPRAZolam (XANAX) 0.25 mg tablet Take 0.25 mg by mouth daily as needed for Anxiety. No current facility-administered medications for this visit.          Past Medical History:   Diagnosis Date    Arrhythmia     A-Fib  on Warfarin    Arthritis     hands and knees bilaterally    Breast cancer ([facility] [address] 2019    left breast    Diverticula, colon     Hypertension     IBS (irritable bowel syndrome)     Irregular heart beat     Psychiatric disorder     some anxiety and depression      Past Surgical History:   Procedure Laterality Date    HX BREAST LUMPECTOMY Left 2019    LEFT BREAST LUMPECTOMY AND LEFT BREAST SENTINEL NODE BIOPSY WIHT BLUE DYE performed by Mis Shirley MD at [address] [place] [address] CATARACT REMOVAL Left     cataract    HX CATARACT REMOVAL Right     cataract    HX  SECTION      X 3    HX CHOLECYSTECTOMY      HX COLONOSCOPY  8/28/15    with endoscopy day before    HX HYSTERECTOMY      partial    NEUROLOGICAL PROCEDURE UNLISTED      lumbar surgery       Social History     Tobacco Use    Smoking status: Former Smoker     Packs/day: 0.25     Last attempt to quit: 1964     Years since quittin.7    Smokeless tobacco: Never Used   Substance Use Topics    Alcohol use: Yes     Comment: glass of wine every other month, holidays      Family History   Problem Relation Age of Onset    Tuberculosis Mother         Allergies   Allergen Reactions    Pcn [Penicillins] Hives        Assessment/Plan  Diagnoses and all orders for this visit:    1. Essential hypertension-blood pressure at goal.  Continue current therapy. -     CBC WITH AUTOMATED DIFF; Future  -     METABOLIC PANEL, COMPREHENSIVE; Future    2. Chronic pain of both knees-has been a bit worse recently. We discussed seeing orthopedist for considerations of injections. Continue PRN Celebrex  -     CBC WITH AUTOMATED DIFF; Future  -     METABOLIC PANEL, COMPREHENSIVE; Future  -     REFERRAL TO ORTHOPEDICS  -     PROTHROMBIN TIME + INR; Future    3. Atrial fibrillation, unspecified type (HCC)-continue Coumadin. INR today. She will repeat INR in 6 to 8 weeks. -     PROTHROMBIN TIME + INR; Future  -     PROTHROMBIN TIME + INR; Future    4. Coagulation disorder (HCC)  -     PROTHROMBIN TIME + INR; Future    5. Insomnia, unspecified type-not affecting her day-to-day activities. 6. Irritable bowel syndrome, unspecified type-has been a bit worse. We discussed the FODMAP diet. Follow-up and Dispositions    · Return in about 3 months (around 2020). Bessie Castro MD  2020    This note was created with the help of speech recognition software [employer]) and may contain some 'sound alike' errors.

## 2020-09-30 LAB
BUN SERPL-MCNC: 15 MG/DL (ref 10–36)
BUN/CREAT SERPL: 24 (ref 12–28)
CALCIUM SERPL-MCNC: 9.4 MG/DL (ref 8.7–10.3)
CHLORIDE SERPL-SCNC: 99 MMOL/L (ref 96–106)
CO2 SERPL-SCNC: 25 MMOL/L (ref 20–29)
CREAT SERPL-MCNC: 0.62 MG/DL (ref 0.57–1)
GLUCOSE SERPL-MCNC: 105 MG/DL (ref 65–99)
INR PPP: 2.6 (ref 0.8–1.2)
POTASSIUM SERPL-SCNC: 4.2 MMOL/L (ref 3.5–5.2)
PROTHROMBIN TIME: 26.9 SEC (ref 9.1–12)
SODIUM SERPL-SCNC: 141 MMOL/L (ref 134–144)

## 2020-10-26 DIAGNOSIS — M62.830 BACK MUSCLE SPASM: ICD-10-CM

## 2020-10-26 RX ORDER — METHOCARBAMOL 500 MG/1
TABLET, FILM COATED ORAL
Qty: 120 TAB | Refills: 2 | Status: SHIPPED | OUTPATIENT
Start: 2020-10-26 | End: 2021-11-09

## 2020-11-04 DIAGNOSIS — N39.0 RECURRENT UTI: ICD-10-CM

## 2020-11-04 RX ORDER — NITROFURANTOIN MACROCRYSTALS 50 MG/1
CAPSULE ORAL
Qty: 90 CAP | Refills: 1 | Status: SHIPPED | OUTPATIENT
Start: 2020-11-04 | End: 2021-04-19

## 2020-12-11 RX ORDER — LISINOPRIL 10 MG/1
TABLET ORAL
Qty: 90 TAB | Refills: 1 | Status: SHIPPED | OUTPATIENT
Start: 2020-12-11 | End: 2021-06-09

## 2020-12-22 DIAGNOSIS — I10 ESSENTIAL HYPERTENSION: ICD-10-CM

## 2020-12-22 RX ORDER — AMLODIPINE BESYLATE 5 MG/1
TABLET ORAL
Qty: 90 TAB | Refills: 1 | Status: SHIPPED | OUTPATIENT
Start: 2020-12-22 | End: 2021-06-21

## 2021-01-28 DIAGNOSIS — Z51.81 ANTICOAGULATION GOAL OF INR 2 TO 3: ICD-10-CM

## 2021-01-28 DIAGNOSIS — Z79.01 ANTICOAGULATION GOAL OF INR 2 TO 3: ICD-10-CM

## 2021-01-28 DIAGNOSIS — D68.9 COAGULATION DISORDER (HCC): ICD-10-CM

## 2021-01-28 RX ORDER — WARFARIN SODIUM 5 MG/1
TABLET ORAL
Qty: 90 TAB | Refills: 1 | Status: SHIPPED | OUTPATIENT
Start: 2021-01-28 | End: 2021-08-03

## 2021-01-28 NOTE — TELEPHONE ENCOUNTER
Has not had her INR done in a long time. This is likely due to fear over pandemic. Please reach out and let her know that we need to get blood work done.  I am okay with her doing this at [facility]

## 2021-03-23 RX ORDER — BISOPROLOL FUMARATE AND HYDROCHLOROTHIAZIDE 10; 6.25 MG/1; MG/1
TABLET ORAL
Qty: 90 TAB | Refills: 1 | Status: SHIPPED | OUTPATIENT
Start: 2021-03-23 | End: 2021-09-20

## 2021-04-08 RX ORDER — POLYETHYLENE GLYCOL 3350 17 G/17G
POWDER, FOR SOLUTION ORAL
Qty: 1530 G | Refills: 2 | Status: SHIPPED | OUTPATIENT
Start: 2021-04-08 | End: 2022-07-29 | Stop reason: SDUPTHER

## 2021-04-18 ENCOUNTER — TELEPHONE (OUTPATIENT)
Dept: INTERNAL MEDICINE CLINIC | Age: 86
End: 2021-04-18

## 2021-04-19 NOTE — TELEPHONE ENCOUNTER
Called by her son-in-law. Patient has discovered an ulceration on her abdomen,near lateral waistline this evening. Is approximately 1 cm and irregular. There is some surrounding ecchymosis type rash which extends about 3 cm superior, 1 cm inferiorly and 8 or 10 cm bilaterally laterally. It is only tender at the ulceration site. There is no warmth surrounding it. There are no fevers or chills and no known insect bite. He sent me some photos and it appears exactly as above. No clear cellulitis and no evidence of abscess. Unclear exactly what caused this. Insect bite? Does not appear infected at the moment. Recommend local wound care and application of antibiotic ointment and a bandage. Monitor daily. Defer antibiotics for now.

## 2021-04-19 NOTE — TELEPHONE ENCOUNTER
VM left on home and mobile # to return call to office to schedule a follow up appt this week with Dr Kvng Joyner. In person or virtual ok.

## 2021-04-20 NOTE — TELEPHONE ENCOUNTER
Return call from pt- states she is fine and no follow up appt necessary. Reports it was just a bug bite that has healed. Provide notified.

## 2021-06-09 RX ORDER — LISINOPRIL 10 MG/1
TABLET ORAL
Qty: 90 TABLET | Refills: 1 | Status: SHIPPED | OUTPATIENT
Start: 2021-06-09 | End: 2021-12-06

## 2021-06-10 RX ORDER — FLUTICASONE PROPIONATE 50 MCG
2 SPRAY, SUSPENSION (ML) NASAL
Qty: 1 BOTTLE | Refills: 1 | Status: SHIPPED | OUTPATIENT
Start: 2021-06-10 | End: 2021-10-07

## 2021-06-21 DIAGNOSIS — I10 ESSENTIAL HYPERTENSION: ICD-10-CM

## 2021-06-21 RX ORDER — AMLODIPINE BESYLATE 5 MG/1
TABLET ORAL
Qty: 90 TABLET | Refills: 1 | Status: SHIPPED | OUTPATIENT
Start: 2021-06-21 | End: 2021-12-20

## 2021-07-28 ENCOUNTER — TELEPHONE (OUTPATIENT)
Dept: INTERNAL MEDICINE CLINIC | Age: 86
End: 2021-07-28

## 2021-07-28 NOTE — TELEPHONE ENCOUNTER
Patients daughter was calling to see if we could move her mothers appointment up sometime this week. If not could we please call her and let her know what she can do to make her mother more comfortable. She has some pressure sores that seem to be bothering her more daily.  Please call Elo Edmond back at: [phone]

## 2021-07-29 ENCOUNTER — OFFICE VISIT (OUTPATIENT)
Dept: INTERNAL MEDICINE CLINIC | Age: 86
End: 2021-07-29
Payer: MEDICARE

## 2021-07-29 VITALS
OXYGEN SATURATION: 97 % | RESPIRATION RATE: 12 BRPM | HEIGHT: 66 IN | DIASTOLIC BLOOD PRESSURE: 80 MMHG | SYSTOLIC BLOOD PRESSURE: 118 MMHG | BODY MASS INDEX: 20.03 KG/M2 | TEMPERATURE: 97.7 F | WEIGHT: 124.6 LBS | HEART RATE: 59 BPM

## 2021-07-29 DIAGNOSIS — Z17.0 MALIGNANT NEOPLASM OF UPPER-OUTER QUADRANT OF LEFT BREAST IN FEMALE, ESTROGEN RECEPTOR POSITIVE (HCC): ICD-10-CM

## 2021-07-29 DIAGNOSIS — F41.9 ANXIETY: ICD-10-CM

## 2021-07-29 DIAGNOSIS — K58.1 IRRITABLE BOWEL SYNDROME WITH CONSTIPATION: ICD-10-CM

## 2021-07-29 DIAGNOSIS — R30.0 DYSURIA: ICD-10-CM

## 2021-07-29 DIAGNOSIS — D68.9 COAGULATION DISORDER (HCC): ICD-10-CM

## 2021-07-29 DIAGNOSIS — I10 ESSENTIAL HYPERTENSION: ICD-10-CM

## 2021-07-29 DIAGNOSIS — C50.412 MALIGNANT NEOPLASM OF UPPER-OUTER QUADRANT OF LEFT BREAST IN FEMALE, ESTROGEN RECEPTOR POSITIVE (HCC): ICD-10-CM

## 2021-07-29 DIAGNOSIS — I48.91 ATRIAL FIBRILLATION, UNSPECIFIED TYPE (HCC): ICD-10-CM

## 2021-07-29 DIAGNOSIS — R21 RASH: Primary | ICD-10-CM

## 2021-07-29 PROCEDURE — G0463 HOSPITAL OUTPT CLINIC VISIT: HCPCS | Performed by: INTERNAL MEDICINE

## 2021-07-29 PROCEDURE — G8510 SCR DEP NEG, NO PLAN REQD: HCPCS | Performed by: INTERNAL MEDICINE

## 2021-07-29 PROCEDURE — G8420 CALC BMI NORM PARAMETERS: HCPCS | Performed by: INTERNAL MEDICINE

## 2021-07-29 PROCEDURE — G8536 NO DOC ELDER MAL SCRN: HCPCS | Performed by: INTERNAL MEDICINE

## 2021-07-29 PROCEDURE — 99214 OFFICE O/P EST MOD 30 MIN: CPT | Performed by: INTERNAL MEDICINE

## 2021-07-29 PROCEDURE — 1101F PT FALLS ASSESS-DOCD LE1/YR: CPT | Performed by: INTERNAL MEDICINE

## 2021-07-29 PROCEDURE — 1090F PRES/ABSN URINE INCON ASSESS: CPT | Performed by: INTERNAL MEDICINE

## 2021-07-29 PROCEDURE — G8427 DOCREV CUR MEDS BY ELIG CLIN: HCPCS | Performed by: INTERNAL MEDICINE

## 2021-07-29 RX ORDER — ALPRAZOLAM 0.25 MG/1
0.25 TABLET ORAL
Qty: 30 TABLET | Refills: 1 | Status: SHIPPED | OUTPATIENT
Start: 2021-07-29

## 2021-07-29 NOTE — PROGRESS NOTES
Ehsan Menchaca is a 80 y.o. female who presents today for Back Pain (RM18// pt presents today for pressure sores on back ) and Medication Refill (needs refill Xanax)  . She has a history of   Patient Active Problem List   Diagnosis Code    HTN (hypertension) I10    IBS (irritable bowel syndrome) K58.9    Cataract H26.9    Diverticulosis K57.90    Chronic atrial fibrillation (HCC) I48.20    Chronic cystitis N30.20    Chronic anticoagulation Z79.01    Advanced care planning/counseling discussion Z71.89    Breast cancer, left ([facility] [address] C50.912    Cholecystitis K81.9    Acute cholecystitis K81.0    S/P cholecystectomy Z90.49   . Today patient is here for an acute visit. .     Sores: Patient has noticed that she has developed some sores to her lower lower right back. .  These are very painful. Onset was couple days ago. Denies any vesicular appearance of lesions before opening up. These are quite superficial.  They are painful when touched but otherwise not painful when covered. Denies any systemic signs or symptoms of infection with exception of her urinary symptoms. Does report that she sits and reads for long periods of time placing a lot of pressure in this area. Denies any bug bites or any other trauma. Not had any issues since having her lumpectomy to her left breast in late 2019. Hypertension- stable. Hypertension ROS: taking medications as instructed, no medication side effects noted, no TIA's, no chest pain on exertion, no dyspnea on exertion, no swelling of ankles     reports that she quit smoking about 57 years ago. She smoked 0.25 packs per day. She has never used smokeless tobacco.    reports current alcohol use. BP Readings from Last 2 Encounters:   07/29/21 118/80   09/29/20 132/72     IBS: Still quite significant. Does seem to be associated with stress. Anticoagulation  Patient here for followup of chronic anticoagulation. Indication: Atrial Fibrillation.   Bleeding Signs/Symptoms:  None. Compliance with warfarin:  Yes  INR's are drawn regularly and have been Therapeutic. Lab Results   Component Value Date/Time    INR 2.6 (H) 09/29/2020 04:31 PM    INR 1.2 (H) 02/17/2020 12:00 PM    INR 1.2 (H) 02/16/2020 03:22 AM    INR POC 2.0 02/24/2020 03:19 PM    Prothrombin time 26.9 (H) 09/29/2020 04:31 PM    Prothrombin time 11.9 (H) 02/17/2020 12:00 PM    Prothrombin time 12.0 (H) 02/16/2020 03:22 AM     Has been having more urological symptoms recently. Some days she has a lot of symptoms and frequency. Rarely has discomfort. She is on a daily low-dose nitrofurantoin. Unclear if this is infection related or not. We will culture her urine. ROS  Review of Systems   Constitutional: Negative for chills, fever and weight loss. HENT: Negative for congestion and sore throat. Eyes: Negative for blurred vision, double vision and photophobia. Respiratory: Negative for cough and shortness of breath. Cardiovascular: Negative for chest pain, palpitations, orthopnea and leg swelling. Gastrointestinal: Positive for abdominal pain, constipation and diarrhea. Negative for heartburn, nausea and vomiting. Genitourinary: Positive for frequency. Negative for dysuria and urgency. Musculoskeletal: Positive for joint pain. Negative for myalgias. Skin: Positive for rash. Neurological: Negative. Negative for headaches. Endo/Heme/Allergies: Does not bruise/bleed easily. Psychiatric/Behavioral: Negative for depression, memory loss and suicidal ideas. Visit Vitals  /80 (BP 1 Location: Left upper arm, BP Patient Position: Sitting, BP Cuff Size: Adult)   Pulse (!) 59   Temp 97.7 °F (36.5 °C) (Oral)   Resp 12   Ht 5' 6\" (1.676 m)   Wt 124 lb 9.6 oz (56.5 kg)   SpO2 97%   BMI 20.11 kg/m²       Physical Exam  Constitutional:       Appearance: She is well-developed. HENT:      Head: Normocephalic and atraumatic.       Right Ear: Tympanic membrane normal.      Left Ear: Tympanic membrane normal.   Cardiovascular:      Rate and Rhythm: Normal rate. Rhythm irregular. Heart sounds: No murmur heard. Pulmonary:      Effort: Pulmonary effort is normal. No respiratory distress. Abdominal:      General: Abdomen is flat. Palpations: Abdomen is soft. Musculoskeletal:        Arms:       Comments: Several small open sores. These are quite superficial.  No surrounding cellulitis. Not warm to the touch but tender. Does not appear to be herpetic   Skin:     General: Skin is warm and dry. Neurological:      Mental Status: She is alert and oriented to person, place, and time. Psychiatric:         Behavior: Behavior normal.           Current Outpatient Medications   Medication Sig    ALPRAZolam (XANAX) 0.25 mg tablet Take 1 Tablet by mouth daily as needed for Anxiety.  amLODIPine (NORVASC) 5 mg tablet TAKE ONE TABLET BY MOUTH ONE TIME DAILY    fluticasone propionate (Flonase Allergy Relief) 50 mcg/actuation nasal spray 2 Sprays by Both Nostrils route daily as needed for Rhinitis or Allergies.  lisinopriL (PRINIVIL, ZESTRIL) 10 mg tablet TAKE ONE TABLET BY MOUTH ONE TIME DAILY    nitrofurantoin (MACRODANTIN) 50 mg capsule Take 1 Cap by mouth nightly.  Gavilax 17 gram/dose powder FILL CAP TO 17G FILL LINE. STIR AND DISSOLVE IN ANY 4 TO 8 OUNCES OF BEVERAGE, THEN DRINK ONE TIME DAILY    bisoprolol-hydroCHLOROthiazide (ZIAC) 10-6.25 mg per tablet TAKE ONE TABLET BY MOUTH ONE TIME DAILY    warfarin (Jantoven) 5 mg tablet TAKE ONE TABLET BY MOUTH ONE TIME DAILY    celecoxib (CELEBREX) 200 mg capsule TAKE ONE CAPSULE BY MOUTH ONE TIME DAILY AS NEEDED    diclofenac (VOLTAREN) 1 % gel APPLY 2G APPLY TO AFFECTED AREA 4 TIMES A DAY    folic acid/multivit-min/lutein (CENTRUM SILVER PO) Take 1 Tab by mouth daily.  cholecalciferol (VITAMIN D3) (1000 Units /25 mcg) tablet Take 1,000 Units by mouth daily.  cyanocobalamin 1,000 mcg tablet Take 1,000 mcg by mouth daily.  methocarbamoL (ROBAXIN) 500 mg tablet TAKE ONE TABLET BY MOUTH FOUR TIMES A DAY AS NEEDED FOR PAIN (Patient not taking: Reported on 2021)     No current facility-administered medications for this visit. Past Medical History:   Diagnosis Date    Arrhythmia     A-Fib  on Warfarin    Arthritis     hands and knees bilaterally    Breast cancer ([facility] [address] 2019    left breast    Diverticula, colon     Hypertension     IBS (irritable bowel syndrome)     Irregular heart beat     Psychiatric disorder     some anxiety and depression      Past Surgical History:   Procedure Laterality Date    HX BREAST LUMPECTOMY Left 2019    LEFT BREAST LUMPECTOMY AND LEFT BREAST SENTINEL NODE BIOPSY WIHT BLUE DYE performed by Leila Rosenberg MD at [address] HX CATARACT REMOVAL Left     cataract    HX CATARACT REMOVAL Right     cataract    HX  SECTION      X 3    HX CHOLECYSTECTOMY      HX COLONOSCOPY  8/28/15    with endoscopy day before    HX HYSTERECTOMY      partial    NEUROLOGICAL PROCEDURE UNLISTED      lumbar surgery       Social History     Tobacco Use    Smoking status: Former Smoker     Packs/day: 0.25     Quit date:      Years since quittin.11    Smokeless tobacco: Never Used   Substance Use Topics    Alcohol use: Yes     Comment: glass of wine every other month, holidays      Family History   Problem Relation Age of Onset    Tuberculosis Mother         Allergies   Allergen Reactions    Pcn [Penicillins] Hives        Assessment/Plan  Diagnoses and all orders for this visit:    1. Rash-sores to lower right side of back. Painful to touch. These do not appear herpetic in nature. May be pressure sores. Patient to make sure not to be placing too much pressure on here. Do not look actively infected. Would use topical antibiotic over night and let air dry during the day. Family will let us know if this does not heal and could send to wound clinic.   - METABOLIC PANEL, COMPREHENSIVE; Future    2. Dysuria-is on chronic nitrofurantoin, not clear if these are bladder spasms or true UTI. Will treat if culture positive. Otherwise could consider AZO  -     CULTURE, URINE; Future    3. Atrial fibrillation, unspecified type (HCC)-check INR    4. Coagulation disorder (HCC)  -     PROTHROMBIN TIME + INR; Future  -     CBC WITH AUTOMATED DIFF; Future    5. Anxiety-refill. Has not been filled in over 2 years  -     ALPRAZolam (XANAX) 0.25 mg tablet; Take 1 Tablet by mouth daily as needed for Anxiety. 6. Essential hypertension-blood pressure stable  -     METABOLIC PANEL, COMPREHENSIVE; Future    7. Malignant neoplasm of upper-outer quadrant of left breast in female, estrogen receptor positive (HCC)-status post lumpectomy in late 2019    8. Irritable bowel syndrome with constipation-could consider using over-the-counter IBgard to see if this helps with symptoms. Robert Vásquez MD  7/29/2021    This note was created with the help of speech recognition software Nayla Huizar) and may contain some 'sound alike' errors.

## 2021-07-29 NOTE — PROGRESS NOTES
Kennedy Dexter  Identified pt with two pt identifiers(name and ). Chief Complaint   Patient presents with    Back Pain     RM18// pt presents today for pressure sores on back     Medication Refill     needs refill Xanax       1. Have you been to the ER, urgent care clinic since your last visit? Hospitalized since your last visit? NO    2. Have you seen or consulted any other health care providers outside of the [address] [place] [address] since your last visit? Include any pap smears or colon screening. NO      Provider notified of reason for visit, vitals and flowsheets obtained on patients.      Patient received paperwork for advance directive during previous visit but has not completed at this time     Reviewed record In preparation for visit, huddled with provider and have obtained necessary documentation      Health Maintenance Due   Topic    COVID-19 Vaccine (1)    Shingrix Vaccine Age 50> (1 of 2)    Medicare Yearly Exam        Wt Readings from Last 3 Encounters:   21 124 lb 9.6 oz (56.5 kg)   20 123 lb (55.8 kg)   04/15/20 123 lb (55.8 kg)     Temp Readings from Last 3 Encounters:   21 97.7 °F (36.5 °C) (Oral)   04/15/20 95.7 °F (35.4 °C) (Oral)   20 97.7 °F (36.5 °C) (Oral)     BP Readings from Last 3 Encounters:   21 118/80   20 132/72   04/15/20 131/76     Pulse Readings from Last 3 Encounters:   21 (!) 59   20 80   04/15/20 75     Vitals:    21 1155   BP: 118/80   Pulse: (!) 59   Resp: 12   Temp: 97.7 °F (36.5 °C)   TempSrc: Oral   SpO2: 97%   Weight: 124 lb 9.6 oz (56.5 kg)   Height: 5' 6\" (1.676 m)   PainSc:   0 - No pain         Learning Assessment:  :     Learning Assessment 10/10/2019 10/5/2015   PRIMARY LEARNER Patient Patient   PRIMARY LANGUAGE ENGLISH ENGLISH   LEARNER PREFERENCE PRIMARY DEMONSTRATION DEMONSTRATION   ANSWERED BY patient patient   RELATIONSHIP SELF SELF       Depression Screening:  :     3 most recent PHQ Screens 7/29/2021   Little interest or pleasure in doing things Several days   Feeling down, depressed, irritable, or hopeless Several days   Total Score PHQ 2 2   Trouble falling or staying asleep, or sleeping too much -   Feeling tired or having little energy -   Poor appetite, weight loss, or overeating -   Feeling bad about yourself - or that you are a failure or have let yourself or your family down -   Trouble concentrating on things such as school, work, reading, or watching TV -   Moving or speaking so slowly that other people could have noticed; or the opposite being so fidgety that others notice -   Thoughts of being better off dead, or hurting yourself in some way -   PHQ 9 Score -   How difficult have these problems made it for you to do your work, take care of your home and get along with others -       Fall Risk Assessment:  :     Fall Risk Assessment, last 12 mths 7/29/2021   Able to walk? Yes   Fall in past 12 months? 1   Do you feel unsteady? 0   Are you worried about falling 0   Is TUG test greater than 12 seconds? 0   Is the gait abnormal? 0   Number of falls in past 12 months 1   Fall with injury? 0       Abuse Screening:  :     Abuse Screening Questionnaire 4/15/2020 2/24/2020 2/6/2020 11/21/2019 8/19/2019 5/9/2019 2/7/2019   Do you ever feel afraid of your partner? N N N N N N N   Are you in a relationship with someone who physically or mentally threatens you? N N N N N N N   Is it safe for you to go home?  Y Y Y Y Y Y Y       ADL Screening:  :     ADL Assessment 6/12/2017   Feeding yourself No Help Needed   Getting from bed to chair No Help Needed   Getting dressed No Help Needed   Bathing or showering No Help Needed   Walk across the room (includes cane/walker) No Help Needed   Using the telphone No Help Needed   Taking your medications No Help Needed   Preparing meals No Help Needed   Managing money (expenses/bills) No Help Needed   Moderately strenuous housework (laundry) No Help Needed   Shopping for personal items (toiletries/medicines) No Help Needed   Shopping for groceries No Help Needed   Driving No Help Needed   Climbing a flight of stairs No Help Needed   Getting to places beyond walking distances No Help Needed         Medication reconciliation up to date and corrected with patient at this time.

## 2021-07-30 LAB
ALBUMIN SERPL-MCNC: 4.8 G/DL (ref 3.5–4.6)
ALBUMIN/GLOB SERPL: 2.2 {RATIO} (ref 1.2–2.2)
ALP SERPL-CCNC: 114 IU/L (ref 48–121)
ALT SERPL-CCNC: 20 IU/L (ref 0–32)
AST SERPL-CCNC: 28 IU/L (ref 0–40)
BASOPHILS # BLD AUTO: 0.1 X10E3/UL (ref 0–0.2)
BASOPHILS NFR BLD AUTO: 1 %
BILIRUB SERPL-MCNC: 1.3 MG/DL (ref 0–1.2)
BUN SERPL-MCNC: 14 MG/DL (ref 10–36)
BUN/CREAT SERPL: 21 (ref 12–28)
CALCIUM SERPL-MCNC: 9.6 MG/DL (ref 8.7–10.3)
CHLORIDE SERPL-SCNC: 100 MMOL/L (ref 96–106)
CO2 SERPL-SCNC: 28 MMOL/L (ref 20–29)
CREAT SERPL-MCNC: 0.66 MG/DL (ref 0.57–1)
EOSINOPHIL # BLD AUTO: 0.1 X10E3/UL (ref 0–0.4)
EOSINOPHIL NFR BLD AUTO: 2 %
ERYTHROCYTE [DISTWIDTH] IN BLOOD BY AUTOMATED COUNT: 12.7 % (ref 11.7–15.4)
GLOBULIN SER CALC-MCNC: 2.2 G/DL (ref 1.5–4.5)
GLUCOSE SERPL-MCNC: 100 MG/DL (ref 65–99)
HCT VFR BLD AUTO: 44.4 % (ref 34–46.6)
HGB BLD-MCNC: 15.1 G/DL (ref 11.1–15.9)
IMM GRANULOCYTES # BLD AUTO: 0 X10E3/UL (ref 0–0.1)
IMM GRANULOCYTES NFR BLD AUTO: 0 %
INR PPP: 3.2 (ref 0.9–1.2)
LYMPHOCYTES # BLD AUTO: 0.9 X10E3/UL (ref 0.7–3.1)
LYMPHOCYTES NFR BLD AUTO: 17 %
MCH RBC QN AUTO: 33.9 PG (ref 26.6–33)
MCHC RBC AUTO-ENTMCNC: 34 G/DL (ref 31.5–35.7)
MCV RBC AUTO: 100 FL (ref 79–97)
MONOCYTES # BLD AUTO: 0.5 X10E3/UL (ref 0.1–0.9)
MONOCYTES NFR BLD AUTO: 10 %
NEUTROPHILS # BLD AUTO: 3.4 X10E3/UL (ref 1.4–7)
NEUTROPHILS NFR BLD AUTO: 70 %
PLATELET # BLD AUTO: 144 X10E3/UL (ref 150–450)
POTASSIUM SERPL-SCNC: 4 MMOL/L (ref 3.5–5.2)
PROT SERPL-MCNC: 7 G/DL (ref 6–8.5)
PROTHROMBIN TIME: 32.9 SEC (ref 9.1–12)
RBC # BLD AUTO: 4.45 X10E6/UL (ref 3.77–5.28)
SODIUM SERPL-SCNC: 140 MMOL/L (ref 134–144)
WBC # BLD AUTO: 4.9 X10E3/UL (ref 3.4–10.8)

## 2021-07-30 NOTE — PROGRESS NOTES
Please let her know that her blood work looks good besides her blood is a little too thin. I would like to keep her on the same dose and have her slightly increase her vitamin K intake. I would like to have this repeated in about 4 weeks.   Please ask them if they want to come to the office or have us fax an order to [facility]

## 2021-07-31 LAB — BACTERIA UR CULT: NORMAL

## 2021-08-02 ENCOUNTER — TELEPHONE (OUTPATIENT)
Dept: INTERNAL MEDICINE CLINIC | Age: 86
End: 2021-08-02

## 2021-08-02 RX ORDER — DOXYCYCLINE 100 MG/1
100 TABLET ORAL 2 TIMES DAILY
Qty: 14 TABLET | Refills: 0 | Status: SHIPPED | OUTPATIENT
Start: 2021-08-02 | End: 2021-08-10 | Stop reason: SDUPTHER

## 2021-08-02 NOTE — TELEPHONE ENCOUNTER
Given worsening skin symptoms I will cover her for cellulitis. I have called in doxycycline for 7 days. This should also help cover any bacteria in her urine since she is on chronic suppressive therapy. Patient is to not take her nitrofurantoin while on doxycycline and I would like to see her back by the end of this round of antibiotics.     Madiha Early

## 2021-08-02 NOTE — TELEPHONE ENCOUNTER
Called pt to inform regarding PCP's notes. Pt understood and scheduled an appt for 8/10 at 245pm for a follow up. Pt verbalized understanding and had no further questions.

## 2021-08-02 NOTE — TELEPHONE ENCOUNTER
Patient called in to state the sores on her back are leaking and when they get stuck to something they bleed. She would like a call back from the nurse.

## 2021-08-02 NOTE — TELEPHONE ENCOUNTER
Called pt to inquire more information on the sores. Pt stated that the sores have increased in numbers since the last time PCP saw her. The bandages are wet and sticks to the sore, hurts whenever she removes it and occasionally they will bleed. Pt denies any fever, chills or body aches. Pt was also notified regarding her negative urine culture. Pt had questions regarding her urinary frequency - she has been going to the bathroom every half hour to an hour. Pt would like to know the next steps.

## 2021-08-03 DIAGNOSIS — Z51.81 ANTICOAGULATION GOAL OF INR 2 TO 3: ICD-10-CM

## 2021-08-03 DIAGNOSIS — D68.9 COAGULATION DISORDER (HCC): ICD-10-CM

## 2021-08-03 DIAGNOSIS — Z79.01 ANTICOAGULATION GOAL OF INR 2 TO 3: ICD-10-CM

## 2021-08-03 RX ORDER — WARFARIN SODIUM 5 MG/1
TABLET ORAL
Qty: 90 TABLET | Refills: 1 | Status: SHIPPED | OUTPATIENT
Start: 2021-08-03 | End: 2022-02-09

## 2021-08-10 ENCOUNTER — OFFICE VISIT (OUTPATIENT)
Dept: INTERNAL MEDICINE CLINIC | Age: 86
End: 2021-08-10
Payer: MEDICARE

## 2021-08-10 VITALS
RESPIRATION RATE: 16 BRPM | BODY MASS INDEX: 20.12 KG/M2 | WEIGHT: 125.2 LBS | OXYGEN SATURATION: 99 % | SYSTOLIC BLOOD PRESSURE: 137 MMHG | HEART RATE: 73 BPM | HEIGHT: 66 IN | DIASTOLIC BLOOD PRESSURE: 74 MMHG | TEMPERATURE: 97.4 F

## 2021-08-10 DIAGNOSIS — N32.81 OAB (OVERACTIVE BLADDER): ICD-10-CM

## 2021-08-10 DIAGNOSIS — R21 SKIN RASH: Primary | ICD-10-CM

## 2021-08-10 DIAGNOSIS — Z51.81 ANTICOAGULATION GOAL OF INR 2 TO 3: ICD-10-CM

## 2021-08-10 DIAGNOSIS — Z79.01 ANTICOAGULATION GOAL OF INR 2 TO 3: ICD-10-CM

## 2021-08-10 PROCEDURE — G0463 HOSPITAL OUTPT CLINIC VISIT: HCPCS | Performed by: INTERNAL MEDICINE

## 2021-08-10 PROCEDURE — 99214 OFFICE O/P EST MOD 30 MIN: CPT | Performed by: INTERNAL MEDICINE

## 2021-08-10 PROCEDURE — G8432 DEP SCR NOT DOC, RNG: HCPCS | Performed by: INTERNAL MEDICINE

## 2021-08-10 PROCEDURE — G8420 CALC BMI NORM PARAMETERS: HCPCS | Performed by: INTERNAL MEDICINE

## 2021-08-10 PROCEDURE — 1101F PT FALLS ASSESS-DOCD LE1/YR: CPT | Performed by: INTERNAL MEDICINE

## 2021-08-10 PROCEDURE — 1090F PRES/ABSN URINE INCON ASSESS: CPT | Performed by: INTERNAL MEDICINE

## 2021-08-10 PROCEDURE — G8427 DOCREV CUR MEDS BY ELIG CLIN: HCPCS | Performed by: INTERNAL MEDICINE

## 2021-08-10 PROCEDURE — G8536 NO DOC ELDER MAL SCRN: HCPCS | Performed by: INTERNAL MEDICINE

## 2021-08-10 RX ORDER — DOXYCYCLINE 100 MG/1
100 TABLET ORAL 2 TIMES DAILY
Qty: 14 TABLET | Refills: 0 | Status: SHIPPED | OUTPATIENT
Start: 2021-08-10 | End: 2021-08-17

## 2021-08-10 NOTE — PROGRESS NOTES
Sam Gabriel is a 80 y.o. female who presents today for Follow-up (sores and urinary frequency)  . She has a history of   Patient Active Problem List   Diagnosis Code    HTN (hypertension) I10    IBS (irritable bowel syndrome) K58.9    Cataract H26.9    Diverticulosis K57.90    Chronic atrial fibrillation (HCC) I48.20    Chronic cystitis N30.20    Chronic anticoagulation Z79.01    Advanced care planning/counseling discussion Z71.89    Breast cancer, left ([address] C50.912    Cholecystitis K81.9    Acute cholecystitis K81.0    S/P cholecystectomy Z90.49   . Today patient is here for follow-up. Ulcers/sores: Patient had developed some sores and ulcers on her backside which were somewhat painful. They became acutely worse after I saw her. We placed her on doxycycline. Since she reports improvement in the lesions and in the pain. They have not quite completely healed yet. Urinary frequency: Patient reports that her urinary frequency has continued since being on doxycycline. Patient reports it is very variable. Some days is better than others. Denies any other infectious symptoms. She has not yet tried AZO. She will be due to repeat her INR by the end of the month as it was slightly elevated at last check. ROS  Review of Systems   Constitutional: Negative for chills, fever and weight loss. HENT: Negative for congestion and sore throat. Eyes: Negative for blurred vision, double vision and photophobia. Respiratory: Negative for cough and shortness of breath. Cardiovascular: Negative for chest pain, palpitations and leg swelling. Gastrointestinal: Positive for abdominal pain and diarrhea. Negative for constipation, heartburn, nausea and vomiting. Genitourinary: Positive for frequency. Negative for dysuria and urgency. Musculoskeletal: Negative for joint pain and myalgias. Skin: Positive for rash. Pain has greatly subsided   Neurological: Negative.   Negative for headaches. Endo/Heme/Allergies: Does not bruise/bleed easily. Psychiatric/Behavioral: Negative for memory loss and suicidal ideas. Visit Vitals  /74 (BP 1 Location: Left upper arm, BP Patient Position: Sitting, BP Cuff Size: Adult)   Pulse 73   Temp 97.4 °F (36.3 °C) (Oral)   Resp 16   Ht 5' 6\" (1.676 m)   Wt 125 lb 3.2 oz (56.8 kg)   SpO2 99%   BMI 20.21 kg/m²       Physical Exam  Constitutional:       Appearance: She is well-developed. HENT:      Head: Normocephalic and atraumatic. Cardiovascular:      Rate and Rhythm: Normal rate and regular rhythm. Heart sounds: No murmur heard. Pulmonary:      Effort: Pulmonary effort is normal. No respiratory distress. Musculoskeletal:        Legs:       Comments: 3-4 lesions that are in different stages of healing. Still do not appear herpetic in nature. Not tender to touch    Skin:     General: Skin is warm and dry. Neurological:      Mental Status: She is alert and oriented to person, place, and time. Psychiatric:         Behavior: Behavior normal.           Current Outpatient Medications   Medication Sig    warfarin (Jantoven) 5 mg tablet TAKE ONE TABLET BY MOUTH ONE TIME DAILY    ALPRAZolam (XANAX) 0.25 mg tablet Take 1 Tablet by mouth daily as needed for Anxiety.  amLODIPine (NORVASC) 5 mg tablet TAKE ONE TABLET BY MOUTH ONE TIME DAILY    fluticasone propionate (Flonase Allergy Relief) 50 mcg/actuation nasal spray 2 Sprays by Both Nostrils route daily as needed for Rhinitis or Allergies.  lisinopriL (PRINIVIL, ZESTRIL) 10 mg tablet TAKE ONE TABLET BY MOUTH ONE TIME DAILY    nitrofurantoin (MACRODANTIN) 50 mg capsule Take 1 Cap by mouth nightly.  Gavilax 17 gram/dose powder FILL CAP TO 17G FILL LINE.  STIR AND DISSOLVE IN ANY 4 TO 8 OUNCES OF BEVERAGE, THEN DRINK ONE TIME DAILY    bisoprolol-hydroCHLOROthiazide (ZIAC) 10-6.25 mg per tablet TAKE ONE TABLET BY MOUTH ONE TIME DAILY    celecoxib (CELEBREX) 200 mg capsule TAKE ONE CAPSULE BY MOUTH ONE TIME DAILY AS NEEDED    diclofenac (VOLTAREN) 1 % gel APPLY 2G APPLY TO AFFECTED AREA 4 TIMES A DAY    folic acid/multivit-min/lutein (CENTRUM SILVER PO) Take 1 Tab by mouth daily.  cholecalciferol (VITAMIN D3) (1000 Units /25 mcg) tablet Take 1,000 Units by mouth daily.  cyanocobalamin 1,000 mcg tablet Take 1,000 mcg by mouth daily.  methocarbamoL (ROBAXIN) 500 mg tablet TAKE ONE TABLET BY MOUTH FOUR TIMES A DAY AS NEEDED FOR PAIN (Patient not taking: Reported on 2021)     No current facility-administered medications for this visit. Past Medical History:   Diagnosis Date    Arrhythmia     A-Fib  on Warfarin    Arthritis     hands and knees bilaterally    Breast cancer ([facility] [address] 2019    left breast    Diverticula, colon     Hypertension     IBS (irritable bowel syndrome)     Irregular heart beat     Psychiatric disorder     some anxiety and depression      Past Surgical History:   Procedure Laterality Date    HX BREAST LUMPECTOMY Left 2019    LEFT BREAST LUMPECTOMY AND LEFT BREAST SENTINEL NODE BIOPSY WIHT BLUE DYE performed by Leila [facility]berg MD at [facility] HX CATARACT REMOVAL Left     cataract    HX CATARACT REMOVAL Right     cataract    HX  SECTION      X 3    HX CHOLECYSTECTOMY      HX COLONOSCOPY  8/28/15    with endoscopy day before    HX HYSTERECTOMY      partial    NEUROLOGICAL PROCEDURE UNLISTED      lumbar surgery       Social History     Tobacco Use    Smoking status: Former Smoker     Packs/day: 0.25     Quit date:      Years since quittin.11    Smokeless tobacco: Never Used   Substance Use Topics    Alcohol use: Yes     Comment: glass of wine every other month, holidays      Family History   Problem Relation Age of Onset    Tuberculosis Mother         Allergies   Allergen Reactions    Pcn [Penicillins] Hives        Assessment/Plan  Diagnoses and all orders for this visit:    1. Skin rash-given the fact that they have not fully healed but greatly improved we will extend out antibiotics for 7 more days. If these recur will have a low threshold to send to dermatology for biopsy and further evaluation  -     doxycycline (ADOXA) 100 mg tablet; Take 1 Tablet by mouth two (2) times a day for 7 days. 2. OAB (overactive bladder)-patient to try AZO. Work-up for infection negative. 3. Anticoagulation goal of INR 2 to 3-slightly elevated. She will have this repeated before the end of the month. -     PROTHROMBIN TIME + INR; Future            Kendra Paulino MD  8/10/2021    This note was created with the help of speech recognition software Cm Martinez) and may contain some 'sound alike' errors.

## 2021-09-08 ENCOUNTER — TELEPHONE (OUTPATIENT)
Dept: INTERNAL MEDICINE CLINIC | Age: 86
End: 2021-09-08

## 2021-09-08 LAB
INR PPP: 3.1 (ref 0.9–1.2)
PROTHROMBIN TIME: 31.1 SEC (ref 9.1–12)

## 2021-09-08 NOTE — TELEPHONE ENCOUNTER
06/23/20 0734   Team Members   Responding MD Brandt   STAT RN Tavares URRUTIA    Rapid Response/ Neuro Alert   Alert Type Neuro Alert / Stroke   Location / Unit at time of call ED   Team Arrival 0735   End Time 0745   Primary Reason for Call Suspected Stroke, signs and symptoms   Call initiated by Caregiver   Interventions During Call CT Head;Other  (CTA)   Recommendations/ Outcomes Stabilized remains on current unit   Admission Source Inpatient   Stroke Last Known Well Date and Time   Last Known Well Date 06/23/20   Last Known Well Time 0620   STROKE ALERT INFORMATION:   Date of Call: 6/23/2020    Admission Source: Inpatient (06/23/20 0734)  via Privately Owned Vehicle   Stroke Call Location: ED  Team Arrival: [id number]    STROKE ASSESSMENTS:  -Last Known Well Date: 06/23/20 at Last Known Well Time: 0620   -Initial NIHSS Score: 1 see scale items below.    -Patient’s dysphagia screening score: Pass (06/23/20 0735)  -Speech therapy contacted No  -Non-contrast head CT results (Initial Head CT Result    -Premorbid Modified Sue: :No symptoms at all (06/23/20 0736)  -Modified Campbell Score::(   -ICH Score: :   -Hunt and Lou Score    SNO Scale    1. Gaze Deviation  No    2. Global or Expressive Aphasia  No    3. One-Side Neglect  No    ACUTE INTERVENTION DATA  Blood pressure:     Stroke Intervention(s):  None (06/23/20 0736)    IV Alteplase Contraindications:  symptoms resolved.    IV alteplase bolus given at     CTA withwithout large vessel occlusion     Patient arrival time to IR at  .  Assisted with pre-procedure needs and hand-off report given to Neuro IR RN.    Please consult on-call acute stroke neurologist if further stroke work-up is warranted.     For any deterioration in status or urgent needs, page 22 and request the stroke RN.     For any routine needs please call [phone].     Stroke Nurse Navigator to follow for stroke metrics and education.      NIH Stroke Scale 1 (06/23/20 0734)    Assessment Interval  Initial  ----- Message from YOMI ARIZA sent at 9/8/2021 10:41 AM EDT -----  Regarding: Dr. Rocío Barrett: [phone]  General Message/Vendor Calls    Caller's first and last name: n/a      Reason for call: lab results      Callback required yes/no and why: yes , information needed      Best contact number(s): [phone]      Details to clarify the request: n/a      YOMI ARIZA   Level of Consciousness 0 Alert   LOC Questions 0 Answers both questions correctly   LOC Commands 0 Performs both tasks correctly   Best Gaze 0 Normal   Visual 0 No visual loss   Facial Palsy 0 Normal   Motor Arm-Left 0 No drift   Motor Arm-Right 0 No drift   Motor Leg-Left 0 No drift   Motor Leg-Right 0 No drift   Limb Ataxia 0 Absent   Sensory 1(subjective numbness on left side per patient ) Mild-to-moderate sensory loss(subjective numbness on left side per patient )   Best Language 0 No aphasia   Dysarthria 0 Normal articulation   Extinction and Inattention 0 No abnormality   NIHSS Score 1        EDUCATION  Bedside education provided.  Questions were encouraged and answered.

## 2021-09-08 NOTE — PROGRESS NOTES
Please inform patient that INR is stable, but still a bit too thin. Continue same dose and repeat INR in 6 weeks.

## 2021-09-08 NOTE — PROGRESS NOTES
Pt verbalized understanding and will repeat INR in 6 weeks. Pt will have wisdom tooth extraction done and requires recommendation on blood thinner medication. Advise pt to stop warfarin 5 days before and resume the day after procedure?

## 2021-09-16 ENCOUNTER — TELEPHONE (OUTPATIENT)
Dept: INTERNAL MEDICINE CLINIC | Age: 86
End: 2021-09-16

## 2021-09-16 NOTE — TELEPHONE ENCOUNTER
Pt is having her wisdom tooth extracted. Pt will stop taking warfarin 5 days before and resume the next day after the procedure.

## 2021-09-20 RX ORDER — BISOPROLOL FUMARATE AND HYDROCHLOROTHIAZIDE 10; 6.25 MG/1; MG/1
TABLET ORAL
Qty: 90 TABLET | Refills: 1 | Status: SHIPPED | OUTPATIENT
Start: 2021-09-20 | End: 2022-03-16

## 2021-10-07 DIAGNOSIS — N39.0 RECURRENT UTI: ICD-10-CM

## 2021-10-07 RX ORDER — NITROFURANTOIN MACROCRYSTALS 50 MG/1
CAPSULE ORAL
Qty: 90 CAPSULE | Refills: 1 | Status: SHIPPED | OUTPATIENT
Start: 2021-10-07 | End: 2022-04-11

## 2021-10-07 RX ORDER — FLUTICASONE PROPIONATE 50 MCG
SPRAY, SUSPENSION (ML) NASAL
Qty: 1 EACH | Refills: 1 | Status: SHIPPED | OUTPATIENT
Start: 2021-10-07 | End: 2022-06-07

## 2021-11-09 DIAGNOSIS — M62.830 BACK MUSCLE SPASM: ICD-10-CM

## 2021-11-09 RX ORDER — METHOCARBAMOL 500 MG/1
TABLET, FILM COATED ORAL
Qty: 120 TABLET | Refills: 2 | Status: SHIPPED | OUTPATIENT
Start: 2021-11-09 | End: 2022-01-19 | Stop reason: ALTCHOICE

## 2021-11-15 ENCOUNTER — TELEPHONE (OUTPATIENT)
Dept: INTERNAL MEDICINE CLINIC | Age: 86
End: 2021-11-15

## 2021-11-15 DIAGNOSIS — Z79.01 ANTICOAGULATION GOAL OF INR 2 TO 3: Primary | ICD-10-CM

## 2021-11-15 DIAGNOSIS — Z51.81 ANTICOAGULATION GOAL OF INR 2 TO 3: Primary | ICD-10-CM

## 2021-11-15 DIAGNOSIS — M62.830 BACK MUSCLE SPASM: Primary | ICD-10-CM

## 2021-11-15 NOTE — TELEPHONE ENCOUNTER
Pt has been having low back pain more on the right and claims that PCP is aware of this issue. Pt is requesting a referral to PT and will let us know which office she would like to go to so we can fax the referral. Lab order for INR faxed over to [facility] at [phone] as requested by pt.

## 2021-11-15 NOTE — TELEPHONE ENCOUNTER
Patient would like a call back from the nurse to discuss having orders placed for her to have blood work done at [facility]. Patient would also like a call back to discuss having physical therapy for her back. Please call back and advise.

## 2021-11-20 LAB
INR PPP: 2.5 (ref 0.9–1.2)
PROTHROMBIN TIME: 25.5 SEC (ref 9.1–12)

## 2021-11-29 DIAGNOSIS — R52 PAIN: ICD-10-CM

## 2021-11-29 RX ORDER — CELECOXIB 200 MG/1
CAPSULE ORAL
Qty: 90 CAPSULE | Refills: 2 | Status: SHIPPED | OUTPATIENT
Start: 2021-11-29

## 2021-12-06 RX ORDER — LISINOPRIL 10 MG/1
TABLET ORAL
Qty: 90 TABLET | Refills: 1 | Status: SHIPPED | OUTPATIENT
Start: 2021-12-06 | End: 2022-04-05 | Stop reason: SDUPTHER

## 2021-12-20 DIAGNOSIS — I10 ESSENTIAL HYPERTENSION: ICD-10-CM

## 2021-12-20 RX ORDER — AMLODIPINE BESYLATE 5 MG/1
TABLET ORAL
Qty: 90 TABLET | Refills: 1 | Status: SHIPPED | OUTPATIENT
Start: 2021-12-20 | End: 2022-03-04 | Stop reason: ALTCHOICE

## 2022-01-18 ENCOUNTER — TELEPHONE (OUTPATIENT)
Dept: INTERNAL MEDICINE CLINIC | Age: 87
End: 2022-01-18

## 2022-01-18 NOTE — TELEPHONE ENCOUNTER
Patient has an appointment to see her PCP next week but would like to know if medication can be sent in during the meantime. She is having spasms in her leg and back that last 3-4 hours and states the pain is intense. Please call and advise if anything can be sent in.

## 2022-01-19 ENCOUNTER — TELEPHONE (OUTPATIENT)
Dept: INTERNAL MEDICINE CLINIC | Age: 87
End: 2022-01-19

## 2022-01-19 DIAGNOSIS — M62.830 BACK MUSCLE SPASM: ICD-10-CM

## 2022-01-19 RX ORDER — CYCLOBENZAPRINE HCL 10 MG
10 TABLET ORAL
Qty: 30 TABLET | Refills: 0 | Status: CANCELLED | OUTPATIENT
Start: 2022-01-19

## 2022-01-19 RX ORDER — CYCLOBENZAPRINE HCL 10 MG
10 TABLET ORAL
Qty: 10 TABLET | Refills: 0 | Status: SHIPPED | OUTPATIENT
Start: 2022-01-19 | End: 2022-01-27 | Stop reason: SDUPTHER

## 2022-01-19 NOTE — TELEPHONE ENCOUNTER
Pt is experiencing muscle spasm due to her RLS. Spasm occurring on her right toes and goes up to her thigh. Pt stated that Robaxin hasn't been working and is requesting for a different muscle relaxer. Pt has never tried Flexeril before.

## 2022-01-19 NOTE — TELEPHONE ENCOUNTER
Patient was calling back to say someone had told her we could call in medication since provider is out of the office until next week - I do see where nurse did reach out but stated nothing about calling in muscle relaxer. Please reach out to patient when you have a chance.

## 2022-01-19 NOTE — TELEPHONE ENCOUNTER
LVM Dr Dk Duncan sent in flexeril for her muscle spasm and warned her of its sedative effects. Pt to call back and schedule an appt if symptoms do not improve.

## 2022-01-27 ENCOUNTER — OFFICE VISIT (OUTPATIENT)
Dept: INTERNAL MEDICINE CLINIC | Age: 87
End: 2022-01-27
Payer: MEDICARE

## 2022-01-27 VITALS
BODY MASS INDEX: 19.64 KG/M2 | OXYGEN SATURATION: 95 % | DIASTOLIC BLOOD PRESSURE: 72 MMHG | WEIGHT: 122.2 LBS | TEMPERATURE: 97.5 F | HEIGHT: 66 IN | RESPIRATION RATE: 16 BRPM | HEART RATE: 67 BPM | SYSTOLIC BLOOD PRESSURE: 116 MMHG

## 2022-01-27 DIAGNOSIS — R19.7 DIARRHEA, UNSPECIFIED TYPE: ICD-10-CM

## 2022-01-27 DIAGNOSIS — R73.01 IFG (IMPAIRED FASTING GLUCOSE): ICD-10-CM

## 2022-01-27 DIAGNOSIS — C50.412 MALIGNANT NEOPLASM OF UPPER-OUTER QUADRANT OF LEFT BREAST IN FEMALE, ESTROGEN RECEPTOR POSITIVE (HCC): ICD-10-CM

## 2022-01-27 DIAGNOSIS — Z17.0 MALIGNANT NEOPLASM OF UPPER-OUTER QUADRANT OF LEFT BREAST IN FEMALE, ESTROGEN RECEPTOR POSITIVE (HCC): ICD-10-CM

## 2022-01-27 DIAGNOSIS — M62.830 BACK MUSCLE SPASM: ICD-10-CM

## 2022-01-27 DIAGNOSIS — I48.91 ATRIAL FIBRILLATION, UNSPECIFIED TYPE (HCC): Primary | ICD-10-CM

## 2022-01-27 LAB
HBA1C MFR BLD HPLC: 5 %
INR BLD: 3.9
PT POC: 46.6 SECONDS
VALID INTERNAL CONTROL?: YES

## 2022-01-27 PROCEDURE — G8510 SCR DEP NEG, NO PLAN REQD: HCPCS | Performed by: INTERNAL MEDICINE

## 2022-01-27 PROCEDURE — 99214 OFFICE O/P EST MOD 30 MIN: CPT | Performed by: INTERNAL MEDICINE

## 2022-01-27 PROCEDURE — G0463 HOSPITAL OUTPT CLINIC VISIT: HCPCS | Performed by: INTERNAL MEDICINE

## 2022-01-27 PROCEDURE — 1101F PT FALLS ASSESS-DOCD LE1/YR: CPT | Performed by: INTERNAL MEDICINE

## 2022-01-27 PROCEDURE — G8427 DOCREV CUR MEDS BY ELIG CLIN: HCPCS | Performed by: INTERNAL MEDICINE

## 2022-01-27 PROCEDURE — 83036 HEMOGLOBIN GLYCOSYLATED A1C: CPT | Performed by: INTERNAL MEDICINE

## 2022-01-27 PROCEDURE — G8536 NO DOC ELDER MAL SCRN: HCPCS | Performed by: INTERNAL MEDICINE

## 2022-01-27 PROCEDURE — G8420 CALC BMI NORM PARAMETERS: HCPCS | Performed by: INTERNAL MEDICINE

## 2022-01-27 PROCEDURE — 85610 PROTHROMBIN TIME: CPT | Performed by: INTERNAL MEDICINE

## 2022-01-27 PROCEDURE — 1090F PRES/ABSN URINE INCON ASSESS: CPT | Performed by: INTERNAL MEDICINE

## 2022-01-27 RX ORDER — CYCLOBENZAPRINE HCL 10 MG
10 TABLET ORAL
Qty: 30 TABLET | Refills: 0 | Status: SHIPPED | OUTPATIENT
Start: 2022-01-27 | End: 2022-03-04

## 2022-01-27 RX ORDER — MONTELUKAST SODIUM 4 MG/1
1 TABLET, CHEWABLE ORAL 2 TIMES DAILY
Qty: 60 TABLET | Refills: 2 | Status: SHIPPED | OUTPATIENT
Start: 2022-01-27 | End: 2022-04-21

## 2022-01-27 NOTE — PROGRESS NOTES
Jessica Vincent is a 80 y.o. female who presents today for Back Pain (improving with flexeril, right sciatica, occasional diarrhea since cholecystectomy and unable to make it to the bathroom, accidents during asleep too.)  . She has a history of   Patient Active Problem List   Diagnosis Code    HTN (hypertension) I10    IBS (irritable bowel syndrome) K58.9    Cataract H26.9    Diverticulosis K57.90    Chronic atrial fibrillation (HCC) I48.20    Chronic cystitis N30.20    Chronic anticoagulation Z79.01    Advanced care planning/counseling discussion Z71.89    Breast cancer, left ([facility] [address] C50.912    Cholecystitis K81.9    Acute cholecystitis K81.0    S/P cholecystectomy Z90.49   . Today patient patient is here for follow-up.   she does not have other concerns. Diarrhea: Patient has been having more diarrhea issues since having her cholecystectomy. She has not a bile acid sequestrant at this point. This is become more of a problem recently. She does have bouts of several days of diarrhea. AFib: INR is elevated. Historically she has been very well controlled. Not sure what has changed. .     Lower back pain: Patient recently had an increase in back pain. Robaxin was switched to flexeril and is helping. Radicular symptoms were present. Overall she has improved. She does note some spasming of her right leg during that time. Doing well since breast cancer surgery in 2019. ROS  Review of Systems   Constitutional: Negative for chills, fever and weight loss. HENT: Negative for congestion and sore throat. Eyes: Negative for blurred vision, double vision and photophobia. Respiratory: Negative for cough and shortness of breath. Cardiovascular: Negative for chest pain, palpitations and leg swelling. Gastrointestinal: Negative for abdominal pain, constipation, diarrhea, heartburn, nausea and vomiting. Genitourinary: Negative for dysuria, frequency and urgency.    Musculoskeletal: Positive for back pain (virtually resolved. ). Negative for joint pain and myalgias. Skin: Negative for rash. Neurological: Negative. Negative for headaches. Endo/Heme/Allergies: Does not bruise/bleed easily. Psychiatric/Behavioral: Negative for memory loss and suicidal ideas. Visit Vitals  /72 (BP 1 Location: Left upper arm, BP Patient Position: Sitting, BP Cuff Size: Adult)   Pulse 67   Temp 97.5 °F (36.4 °C) (Oral)   Resp 16   Ht 5' 6\" (1.676 m)   Wt 122 lb 3.2 oz (55.4 kg)   SpO2 95%   BMI 19.72 kg/m²       Physical Exam  Constitutional:       Appearance: She is well-developed. HENT:      Head: Normocephalic and atraumatic. Cardiovascular:      Rate and Rhythm: Normal rate and regular rhythm. Heart sounds: No murmur heard. Pulmonary:      Effort: Pulmonary effort is normal. No respiratory distress. Skin:     General: Skin is warm and dry. Neurological:      Mental Status: She is alert and oriented to person, place, and time. Psychiatric:         Behavior: Behavior normal.           Current Outpatient Medications   Medication Sig    colestipoL (COLESTID) 1 gram tablet Take 1 Tablet by mouth two (2) times a day.  cyclobenzaprine (FLEXERIL) 10 mg tablet Take 1 Tablet by mouth nightly as needed for Muscle Spasm(s).     amLODIPine (NORVASC) 5 mg tablet TAKE ONE TABLET BY MOUTH ONE TIME DAILY    lisinopriL (PRINIVIL, ZESTRIL) 10 mg tablet TAKE ONE TABLET BY MOUTH ONE TIME DAILY    celecoxib (CELEBREX) 200 mg capsule TAKE ONE CAPSULE BY MOUTH ONE TIME DAILY AS NEEDED    nitrofurantoin (MACRODANTIN) 50 mg capsule TAKE ONE CAPSULE BY MOUTH ONE TIME DAILY IN THE EVENING    fluticasone propionate (FLONASE) 50 mcg/actuation nasal spray USE TWO SPRAYS IN EACH NOSTRIL ONE TIME DAILY AS NEEDED FOR RHINITIS OR ALLERGIES    bisoprolol-hydroCHLOROthiazide (ZIAC) 10-6.25 mg per tablet TAKE ONE TABLET BY MOUTH ONE TIME DAILY    warfarin (Jantoven) 5 mg tablet TAKE ONE TABLET BY MOUTH ONE TIME DAILY    ALPRAZolam (XANAX) 0.25 mg tablet Take 1 Tablet by mouth daily as needed for Anxiety.  Gavilax 17 gram/dose powder FILL CAP TO 17G FILL LINE. STIR AND DISSOLVE IN ANY 4 TO 8 OUNCES OF BEVERAGE, THEN DRINK ONE TIME DAILY    diclofenac (VOLTAREN) 1 % gel APPLY 2G APPLY TO AFFECTED AREA 4 TIMES A DAY    folic acid/multivit-min/lutein (CENTRUM SILVER PO) Take 1 Tab by mouth daily.  cholecalciferol (VITAMIN D3) (1000 Units /25 mcg) tablet Take 1,000 Units by mouth daily.  cyanocobalamin 1,000 mcg tablet Take 1,000 mcg by mouth daily. No current facility-administered medications for this visit.         Past Medical History:   Diagnosis Date    Arrhythmia     A-Fib  on Warfarin    Arthritis     hands and knees bilaterally    Breast cancer ([facility] [address] 2019    left breast    Diverticula, colon     Hypertension     IBS (irritable bowel syndrome)     Irregular heart beat     Psychiatric disorder     some anxiety and depression      Past Surgical History:   Procedure Laterality Date    HX BREAST LUMPECTOMY Left 2019    LEFT BREAST LUMPECTOMY AND LEFT BREAST SENTINEL NODE BIOPSY WIHT BLUE DYE performed by Alison Betts MD at [address] HX CATARACT REMOVAL Left     cataract    HX CATARACT REMOVAL Right     cataract    HX  SECTION      X 3    HX CHOLECYSTECTOMY      HX COLONOSCOPY  8/28/15    with endoscopy day before    HX HYSTERECTOMY      partial    NEUROLOGICAL PROCEDURE UNLISTED      lumbar surgery       Social History     Tobacco Use    Smoking status: Former Smoker     Packs/day: 0.25     Quit date:      Years since quittin.1    Smokeless tobacco: Never Used   Substance Use Topics    Alcohol use: Yes     Comment: glass of wine every other month, holidays      Family History   Problem Relation Age of Onset    Tuberculosis Mother         Allergies   Allergen Reactions    Pcn [Penicillins] Hives Assessment/Plan  Diagnoses and all orders for this visit:    1. Atrial fibrillation, unspecified type (HCC)-supratherapeutic INR. No significant changes have occurred to medications with exception of the Flexeril. Patient will remain on same dose that she has historically been very stable repeat this lab in 2 weeks. -     AMB POC PT/INR  -     PROTHROMBIN TIME + INR; Future    2. IFG (impaired fasting glucose)  -     AMB POC HEMOGLOBIN A1C    3. Back muscle spasm-having radicular symptoms. These have resolved continue as needed Flexeril  -     cyclobenzaprine (FLEXERIL) 10 mg tablet; Take 1 Tablet by mouth nightly as needed for Muscle Spasm(s). 4. Malignant neoplasm of upper-outer quadrant of left breast in female, estrogen receptor positive (HCC)-has done well since lumpectomy    5. Diarrhea, unspecified type-more since gallbladder was removed. Patient to use as needed bile acid sequestrant. -     colestipoL (COLESTID) 1 gram tablet; Take 1 Tablet by mouth two (2) times a day. Tura Collet, MD  1/27/2022    This note was created with the help of speech recognition software Giancarlo LinguaLeo) and may contain some 'sound alike' errors.

## 2022-02-09 DIAGNOSIS — I48.91 ATRIAL FIBRILLATION, UNSPECIFIED TYPE (HCC): Primary | ICD-10-CM

## 2022-02-09 DIAGNOSIS — D68.9 COAGULATION DISORDER (HCC): ICD-10-CM

## 2022-02-09 DIAGNOSIS — Z79.01 ANTICOAGULATION GOAL OF INR 2 TO 3: ICD-10-CM

## 2022-02-09 DIAGNOSIS — Z51.81 ANTICOAGULATION GOAL OF INR 2 TO 3: ICD-10-CM

## 2022-02-09 LAB
INR PPP: 3.8 (ref 0.9–1.2)
PROTHROMBIN TIME: 38.3 SEC (ref 9.1–12)

## 2022-02-09 RX ORDER — WARFARIN 4 MG/1
TABLET ORAL
Qty: 45 TABLET | Refills: 1 | Status: SHIPPED | OUTPATIENT
Start: 2022-02-09 | End: 2022-03-06

## 2022-02-09 RX ORDER — WARFARIN SODIUM 5 MG/1
TABLET ORAL
Qty: 45 TABLET | Refills: 1
Start: 2022-02-09 | End: 2022-02-14

## 2022-02-09 NOTE — PROGRESS NOTES
Informed pt PCP's notes and pt verbalized understanding. Lab order faxed to [address] [place] [address] on [address].

## 2022-02-09 NOTE — PROGRESS NOTES
Please inform patient that her INR is still too high. I would like to change her dose to 4 mg on Monday Wednesday Fridays, keep 5 mg all other days. Repeat INR in 2 weeks. Please fax order to [place] of her choosing.   I have sent in the 4 mg Coumadin

## 2022-03-02 ENCOUNTER — TELEPHONE (OUTPATIENT)
Dept: INTERNAL MEDICINE CLINIC | Age: 87
End: 2022-03-02

## 2022-03-02 NOTE — TELEPHONE ENCOUNTER
Pt son in law is calling states pt is having trouble breathing and wants to speak with the nurse.  Please call back and advise

## 2022-03-03 NOTE — TELEPHONE ENCOUNTER
Spoke with Kelly Degroot, son in law, pt has had episodes where she had to stop after walking down the [address] to catch her breath. Episodes have been occurring more frequently since warfarin dose change. Pt denies chest pain, dizziness when these episodes occur. Son in law is concern regarding pt's condition and would like PCP's advise on getting OT to evaluate pt. Appt set up for tomorrow, Kelly Degroot verbalized understanding and thankful for the assistance.

## 2022-03-04 ENCOUNTER — OFFICE VISIT (OUTPATIENT)
Dept: INTERNAL MEDICINE CLINIC | Age: 87
End: 2022-03-04
Payer: MEDICARE

## 2022-03-04 ENCOUNTER — TELEPHONE (OUTPATIENT)
Dept: INTERNAL MEDICINE CLINIC | Age: 87
End: 2022-03-04

## 2022-03-04 ENCOUNTER — HOSPITAL ENCOUNTER (OUTPATIENT)
Dept: GENERAL RADIOLOGY | Age: 87
Discharge: HOME | End: 2022-03-04
Payer: MEDICARE

## 2022-03-04 VITALS
OXYGEN SATURATION: 97 % | WEIGHT: 121.4 LBS | SYSTOLIC BLOOD PRESSURE: 104 MMHG | BODY MASS INDEX: 19.51 KG/M2 | HEIGHT: 66 IN | RESPIRATION RATE: 12 BRPM | TEMPERATURE: 97.6 F | DIASTOLIC BLOOD PRESSURE: 65 MMHG | HEART RATE: 82 BPM

## 2022-03-04 DIAGNOSIS — I10 ESSENTIAL HYPERTENSION: ICD-10-CM

## 2022-03-04 DIAGNOSIS — R06.09 DOE (DYSPNEA ON EXERTION): Primary | ICD-10-CM

## 2022-03-04 DIAGNOSIS — Z51.81 ANTICOAGULATION GOAL OF INR 2 TO 3: ICD-10-CM

## 2022-03-04 DIAGNOSIS — I48.91 ATRIAL FIBRILLATION, UNSPECIFIED TYPE (HCC): ICD-10-CM

## 2022-03-04 DIAGNOSIS — D68.9 COAGULATION DISORDER (HCC): ICD-10-CM

## 2022-03-04 DIAGNOSIS — Z79.01 ANTICOAGULATION GOAL OF INR 2 TO 3: ICD-10-CM

## 2022-03-04 DIAGNOSIS — R06.09 DOE (DYSPNEA ON EXERTION): ICD-10-CM
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
## 2022-03-04 PROCEDURE — 1090F PRES/ABSN URINE INCON ASSESS: CPT | Performed by: INTERNAL MEDICINE

## 2022-03-04 PROCEDURE — 99214 OFFICE O/P EST MOD 30 MIN: CPT | Performed by: INTERNAL MEDICINE

## 2022-03-04 PROCEDURE — 71046 X-RAY EXAM CHEST 2 VIEWS: CPT

## 2022-03-04 PROCEDURE — 1101F PT FALLS ASSESS-DOCD LE1/YR: CPT | Performed by: INTERNAL MEDICINE

## 2022-03-04 PROCEDURE — G8420 CALC BMI NORM PARAMETERS: HCPCS | Performed by: INTERNAL MEDICINE

## 2022-03-04 PROCEDURE — G8510 SCR DEP NEG, NO PLAN REQD: HCPCS | Performed by: INTERNAL MEDICINE

## 2022-03-04 PROCEDURE — G8427 DOCREV CUR MEDS BY ELIG CLIN: HCPCS | Performed by: INTERNAL MEDICINE

## 2022-03-04 PROCEDURE — G0463 HOSPITAL OUTPT CLINIC VISIT: HCPCS | Performed by: INTERNAL MEDICINE

## 2022-03-04 PROCEDURE — G8536 NO DOC ELDER MAL SCRN: HCPCS | Performed by: INTERNAL MEDICINE

## 2022-03-04 NOTE — PROGRESS NOTES
Chey Araujo is a 80 y.o. female who presents today for Fall (RM19// pt presents today with shortness of breath and tachycardia started to worsen after fall on Tuesday)  . She has a history of   Patient Active Problem List   Diagnosis Code    HTN (hypertension) I10    IBS (irritable bowel syndrome) K58.9    Cataract H26.9    Diverticulosis K57.90    Chronic atrial fibrillation (HCC) I48.20    Chronic cystitis N30.20    Chronic anticoagulation Z79.01    Advanced care planning/counseling discussion Z71.89    Breast cancer, left ([address] C50.912    Cholecystitis K81.9    Acute cholecystitis K81.0    S/P cholecystectomy Z90.49   . Today patient is here for an acute visit. .     Problem visit:  Chey Araujo is here for complaint of worsening SOB. Problem began 3 day(s) ago. Severity is moderate  Character of problem: LOBO, seems to have started after a fall on Tuesday. [employer] on her backside. Has had some bruising. No LOC. Mildly painful. Patient does report that she has some mild shortness of breath but this is been drastically worse since. Denies any actual chest pain. Denies any swelling of her legs. Denies any orthopnea    Afib: on coumadin. Dose was adjusted last month. Hypertension- borderline low. Denies any orthostatic symptoms. Reports fall was due to not being careful not due to dizziness. Hypertension ROS: taking medications as instructed, no medication side effects noted, no TIA's, no chest pain on exertion, no dyspnea on exertion, no swelling of ankles     reports that she quit smoking about 58 years ago. She smoked 0.25 packs per day. She has never used smokeless tobacco.    reports current alcohol use. BP Readings from Last 2 Encounters:   03/04/22 104/65   01/27/22 116/72       ROS  Review of Systems   Constitutional: Positive for malaise/fatigue. Negative for chills, fever and weight loss. HENT: Negative for congestion and sore throat.     Eyes: Negative for blurred vision, double vision and photophobia. Respiratory: Positive for shortness of breath. Negative for cough. LOBO   Cardiovascular: Negative for chest pain, palpitations and leg swelling. Gastrointestinal: Negative for abdominal pain, constipation, diarrhea, heartburn, nausea and vomiting. Genitourinary: Negative for dysuria, frequency and urgency. Musculoskeletal: Positive for back pain and falls. Negative for joint pain and myalgias. Skin: Negative for rash. Neurological: Negative. Negative for headaches. Endo/Heme/Allergies: Does not bruise/bleed easily. Psychiatric/Behavioral: Negative for memory loss and suicidal ideas. Visit Vitals  /65 (BP 1 Location: Left upper arm, BP Patient Position: Sitting, BP Cuff Size: Adult)   Pulse 82   Temp 97.6 °F (36.4 °C) (Oral)   Resp 12   Ht 5' 6\" (1.676 m)   Wt 121 lb 6.4 oz (55.1 kg)   SpO2 97%   BMI 19.59 kg/m²       Physical Exam  Constitutional:       General: She is not in acute distress. Appearance: She is well-developed. HENT:      Head: Normocephalic and atraumatic. Neck:      Thyroid: No thyromegaly. Cardiovascular:      Rate and Rhythm: Normal rate and regular rhythm. Heart sounds: No murmur heard. Comments: Irregular. Pulmonary:      Effort: Pulmonary effort is normal.      Breath sounds: Normal breath sounds. No wheezing. Abdominal:      General: Bowel sounds are normal. There is no distension. Palpations: Abdomen is soft. Musculoskeletal:      Cervical back: Normal range of motion and neck supple. Legs:       Comments: Bruising to lateral posterior aspect of left side were noted. No obvious large hematoma. Skin:     General: Skin is warm and dry. Neurological:      Mental Status: She is alert and oriented to person, place, and time. Cranial Nerves: No cranial nerve deficit.    Psychiatric:         Behavior: Behavior normal.           Current Outpatient Medications   Medication Sig    warfarin (Jantoven) 5 mg tablet Take 5 mg by mouth on Tuesday, Thursday, Saturday, Sunday. Take 4 mg on other days.  warfarin (COUMADIN) 4 mg tablet Take 4 mg by mouth on Monday, Wednesday, Friday. Take 5 mg on other days    colestipoL (COLESTID) 1 gram tablet Take 1 Tablet by mouth two (2) times a day.  amLODIPine (NORVASC) 5 mg tablet TAKE ONE TABLET BY MOUTH ONE TIME DAILY    lisinopriL (PRINIVIL, ZESTRIL) 10 mg tablet TAKE ONE TABLET BY MOUTH ONE TIME DAILY    celecoxib (CELEBREX) 200 mg capsule TAKE ONE CAPSULE BY MOUTH ONE TIME DAILY AS NEEDED    nitrofurantoin (MACRODANTIN) 50 mg capsule TAKE ONE CAPSULE BY MOUTH ONE TIME DAILY IN THE EVENING    fluticasone propionate (FLONASE) 50 mcg/actuation nasal spray USE TWO SPRAYS IN EACH NOSTRIL ONE TIME DAILY AS NEEDED FOR RHINITIS OR ALLERGIES    bisoprolol-hydroCHLOROthiazide (ZIAC) 10-6.25 mg per tablet TAKE ONE TABLET BY MOUTH ONE TIME DAILY    ALPRAZolam (XANAX) 0.25 mg tablet Take 1 Tablet by mouth daily as needed for Anxiety.  Gavilax 17 gram/dose powder FILL CAP TO 17G FILL LINE. STIR AND DISSOLVE IN ANY 4 TO 8 OUNCES OF BEVERAGE, THEN DRINK ONE TIME DAILY    diclofenac (VOLTAREN) 1 % gel APPLY 2G APPLY TO AFFECTED AREA 4 TIMES A DAY    folic acid/multivit-min/lutein (CENTRUM SILVER PO) Take 1 Tab by mouth daily.  cholecalciferol (VITAMIN D3) (1000 Units /25 mcg) tablet Take 1,000 Units by mouth daily.  cyanocobalamin 1,000 mcg tablet Take 1,000 mcg by mouth daily. No current facility-administered medications for this visit.         Past Medical History:   Diagnosis Date    Arrhythmia 2003    A-Fib  on Warfarin    Arthritis     hands and knees bilaterally    Breast cancer ([facility] [address] 09/2019    left breast    Diverticula, colon     Hypertension     IBS (irritable bowel syndrome)     Irregular heart beat     Psychiatric disorder     some anxiety and depression      Past Surgical History:   Procedure Laterality Date    HX BREAST LUMPECTOMY Left 2019    LEFT BREAST LUMPECTOMY AND LEFT BREAST SENTINEL NODE BIOPSY WIHT BLUE DYE performed by Joey Maurer MD at [address] HX CATARACT REMOVAL Left     cataract    HX CATARACT REMOVAL Right 2013    cataract    HX  SECTION      X 3    HX CHOLECYSTECTOMY      HX COLONOSCOPY  8/28/15    with endoscopy day before    HX HYSTERECTOMY      partial    NEUROLOGICAL PROCEDURE UNLISTED      lumbar surgery       Social History     Tobacco Use    Smoking status: Former Smoker     Packs/day: 0.25     Quit date:      Years since quittin.2    Smokeless tobacco: Never Used   Substance Use Topics    Alcohol use: Yes     Comment: glass of wine every other month, holidays      Family History   Problem Relation Age of Onset    Tuberculosis Mother         Allergies   Allergen Reactions    Pcn [Penicillins] Hives        Assessment/Plan  Diagnoses and all orders for this visit:    1. LOBO (dyspnea on exertion)-unclear of cause of acute dyspnea on exertion. Denies any chest pain. Low concern for VTE. Does not exhibit classic symptoms of heart failure. Rate today is relatively well controlled. Will check chest x-ray and blood work today. Will drop blood pressure medications with goal blood pressure above 120 to see if this is affecting the way she feels.  -     NT-PRO BNP; Future  -     XR CHEST PA LAT; Future    2. Anticoagulation goal of INR 2 to 3-on lower dose of Coumadin    3. Coagulation disorder ([address]    4. Atrial fibrillation, unspecified type (HCC)-see above  -     PROTHROMBIN TIME + INR; Future    5. Essential hypertension-   -     CBC WITH AUTOMATED DIFF; Future  -     METABOLIC PANEL, COMPREHENSIVE; Future    6.  Fall, initial encounter-given her generalized weakness and recent fall, would like OT and home health come see her.  -     [id number] Stephen French MD  3/4/2022    This note was created with the help of speech recognition software (Dragon) and may contain some 'sound alike' errors.

## 2022-03-04 NOTE — PROGRESS NOTES
Please inform her that there is no acute abnormalities causing her shortness of breath on chest x-ray.   Continue plan as we discussed

## 2022-03-04 NOTE — TELEPHONE ENCOUNTER
Per PCP, informed son in law that pt's CXR is normal. PCP will be in touch after blood results come in. Mary Merritt would like [facility] orders sent to [address] [facility]. Spoke with Ms Amita Powell from [facility] [address] for Mom and she will contact Mary Merritt for further assessment.

## 2022-03-04 NOTE — PROGRESS NOTES
Diane Ramos  Identified pt with two pt identifiers(name and ). Chief Complaint   Patient presents with    Fall     RM19// pt presents today with shortness of breath and tachycardia started to worsen after fall on . Have you been to the ER, urgent care clinic since your last visit? Hospitalized since your last visit? NO    2. Have you seen or consulted any other health care providers outside of the [address] [place] [address] since your last visit? Include any pap smears or colon screening. NO      Provider notified of reason for visit, vitals and flowsheets obtained on patients.      Patient received paperwork for advance directive during previous visit but has not completed at this time     Reviewed record In preparation for visit, huddled with provider and have obtained necessary documentation      Health Maintenance Due   Topic    Shingrix Vaccine Age 50> (1 of 2)    Medicare Yearly Exam        Wt Readings from Last 3 Encounters:   22 121 lb 6.4 oz (55.1 kg)   22 122 lb 3.2 oz (55.4 kg)   08/10/21 125 lb 3.2 oz (56.8 kg)     Temp Readings from Last 3 Encounters:   22 97.6 °F (36.4 °C) (Oral)   22 97.5 °F (36.4 °C) (Oral)   08/10/21 97.4 °F (36.3 °C) (Oral)     BP Readings from Last 3 Encounters:   22 104/65   22 116/72   08/10/21 137/74     Pulse Readings from Last 3 Encounters:   22 82   22 67   08/10/21 73     Vitals:    22 1153   BP: 104/65   Pulse: 82   Resp: 12   Temp: 97.6 °F (36.4 °C)   TempSrc: Oral   SpO2: 97%   Weight: 121 lb 6.4 oz (55.1 kg)   Height: 5' 6\" (1.676 m)   PainSc:  10 - Worst pain ever   PainLoc: Buttocks         Learning Assessment:  :     Learning Assessment 10/10/2019 10/5/2015   PRIMARY LEARNER Patient Patient   PRIMARY LANGUAGE ENGLISH ENGLISH   LEARNER PREFERENCE PRIMARY DEMONSTRATION DEMONSTRATION   ANSWERED BY patient patient   RELATIONSHIP SELF SELF       Depression Screening:  :     3 most recent PHQ Screens 3/4/2022   Little interest or pleasure in doing things Not at all   Feeling down, depressed, irritable, or hopeless Not at all   Total Score PHQ 2 0   Trouble falling or staying asleep, or sleeping too much -   Feeling tired or having little energy -   Poor appetite, weight loss, or overeating -   Feeling bad about yourself - or that you are a failure or have let yourself or your family down -   Trouble concentrating on things such as school, work, reading, or watching TV -   Moving or speaking so slowly that other people could have noticed; or the opposite being so fidgety that others notice -   Thoughts of being better off dead, or hurting yourself in some way -   PHQ 9 Score -   How difficult have these problems made it for you to do your work, take care of your home and get along with others -       Fall Risk Assessment:  :     Fall Risk Assessment, last 12 mths 1/27/2022   Able to walk? Yes   Fall in past 12 months? 0   Do you feel unsteady? 0   Are you worried about falling 0   Is TUG test greater than 12 seconds? -   Is the gait abnormal? -   Number of falls in past 12 months -   Fall with injury? -       Abuse Screening:  :     Abuse Screening Questionnaire 4/15/2020 2/24/2020 2/6/2020 11/21/2019 8/19/2019 5/9/2019 2/7/2019   Do you ever feel afraid of your partner? N N N N N N N   Are you in a relationship with someone who physically or mentally threatens you? N N N N N N N   Is it safe for you to go home?  Y Y Y Y Y Y Y       ADL Screening:  :     ADL Assessment 6/12/2017   Feeding yourself No Help Needed   Getting from bed to chair No Help Needed   Getting dressed No Help Needed   Bathing or showering No Help Needed   Walk across the room (includes cane/walker) No Help Needed   Using the telphone No Help Needed   Taking your medications No Help Needed   Preparing meals No Help Needed   Managing money (expenses/bills) No Help Needed   Moderately strenuous housework (laundry) No Help Needed Shopping for personal items (toiletries/medicines) No Help Needed   Shopping for groceries No Help Needed   Driving No Help Needed   Climbing a flight of stairs No Help Needed   Getting to places beyond walking distances No Help Needed         Medication reconciliation up to date and corrected with patient at this time.

## 2022-03-05 LAB
ALBUMIN SERPL-MCNC: 3.8 G/DL (ref 3.5–5)
ALBUMIN/GLOB SERPL: 1.5 {RATIO} (ref 1.1–2.2)
ALP SERPL-CCNC: 101 U/L (ref 45–117)
ALT SERPL-CCNC: 19 U/L (ref 12–78)
ANION GAP SERPL CALC-SCNC: 5 MMOL/L (ref 5–15)
AST SERPL-CCNC: 20 U/L (ref 15–37)
BASOPHILS # BLD: 0.1 K/UL (ref 0–0.1)
BASOPHILS NFR BLD: 1 % (ref 0–1)
BILIRUB SERPL-MCNC: 1.4 MG/DL (ref 0.2–1)
BNP SERPL-MCNC: 1268 PG/ML
BUN SERPL-MCNC: 18 MG/DL (ref 6–20)
BUN/CREAT SERPL: 26 (ref 12–20)
CALCIUM SERPL-MCNC: 9.4 MG/DL (ref 8.5–10.1)
CHLORIDE SERPL-SCNC: 105 MMOL/L (ref 97–108)
CO2 SERPL-SCNC: 29 MMOL/L (ref 21–32)
CREAT SERPL-MCNC: 0.69 MG/DL (ref 0.55–1.02)
DIFFERENTIAL METHOD BLD: ABNORMAL
EOSINOPHIL # BLD: 0.1 K/UL (ref 0–0.4)
EOSINOPHIL NFR BLD: 1 % (ref 0–7)
ERYTHROCYTE [DISTWIDTH] IN BLOOD BY AUTOMATED COUNT: 13.5 % (ref 11.5–14.5)
GLOBULIN SER CALC-MCNC: 2.6 G/DL (ref 2–4)
GLUCOSE SERPL-MCNC: 106 MG/DL (ref 65–100)
HCT VFR BLD AUTO: 36.5 % (ref 35–47)
HGB BLD-MCNC: 12.1 G/DL (ref 11.5–16)
IMM GRANULOCYTES # BLD AUTO: 0 K/UL (ref 0–0.04)
IMM GRANULOCYTES NFR BLD AUTO: 1 % (ref 0–0.5)
INR PPP: 3.4 (ref 0.9–1.1)
LYMPHOCYTES # BLD: 1.1 K/UL (ref 0.8–3.5)
LYMPHOCYTES NFR BLD: 17 % (ref 12–49)
MCH RBC QN AUTO: 34 PG (ref 26–34)
MCHC RBC AUTO-ENTMCNC: 33.2 G/DL (ref 30–36.5)
MCV RBC AUTO: 102.5 FL (ref 80–99)
MONOCYTES # BLD: 0.7 K/UL (ref 0–1)
MONOCYTES NFR BLD: 10 % (ref 5–13)
NEUTS SEG # BLD: 4.5 K/UL (ref 1.8–8)
NEUTS SEG NFR BLD: 70 % (ref 32–75)
NRBC # BLD: 0 K/UL (ref 0–0.01)
NRBC BLD-RTO: 0 PER 100 WBC
PLATELET # BLD AUTO: 156 K/UL (ref 150–400)
PMV BLD AUTO: 12.1 FL (ref 8.9–12.9)
POTASSIUM SERPL-SCNC: 3.9 MMOL/L (ref 3.5–5.1)
PROT SERPL-MCNC: 6.4 G/DL (ref 6.4–8.2)
PROTHROMBIN TIME: 34 SEC (ref 9–11.1)
RBC # BLD AUTO: 3.56 M/UL (ref 3.8–5.2)
SODIUM SERPL-SCNC: 139 MMOL/L (ref 136–145)
WBC # BLD AUTO: 6.4 K/UL (ref 3.6–11)

## 2022-03-06 DIAGNOSIS — Z51.81 ANTICOAGULATION GOAL OF INR 2 TO 3: ICD-10-CM

## 2022-03-06 DIAGNOSIS — Z79.01 ANTICOAGULATION GOAL OF INR 2 TO 3: ICD-10-CM

## 2022-03-06 DIAGNOSIS — D68.9 COAGULATION DISORDER (HCC): ICD-10-CM

## 2022-03-06 RX ORDER — WARFARIN 4 MG/1
TABLET ORAL
Qty: 45 TABLET | Refills: 1
Start: 2022-03-06 | End: 2022-04-05 | Stop reason: SDUPTHER

## 2022-03-06 NOTE — PROGRESS NOTES
Please inform her that labs overall look good. Blood is still too thin, decrease blood thinner to 4mg daily.

## 2022-03-08 ENCOUNTER — TELEPHONE (OUTPATIENT)
Dept: INTERNAL MEDICINE CLINIC | Age: 87
End: 2022-03-08

## 2022-03-08 NOTE — TELEPHONE ENCOUNTER
[facility] called in to let us know that pt had a couple of episodes of near black out experience. He was unable to get BP during the episode but one reading was 138/90 today, pulse 69. Pt has ongoing diarrhea issues. Advised son in law to monitor pt's BP and pulse, keep pt hydrated and let us know her BP readings within next couple of days.

## 2022-03-08 NOTE — TELEPHONE ENCOUNTER
Given her underlying A. fib and presyncopal symptoms, I would like you to be seen by cardiology. Please schedule her to see one of our cardiologist as soon as possible.     Micaela Powell

## 2022-03-08 NOTE — TELEPHONE ENCOUNTER
Spoke with Sage Jimenez to inform pt's appt with Dr Re Cortes on 3/16/22 at 140pm. To bring pt to ER if symptoms worsening prior appt. Moncho verbalized understanding.

## 2022-03-08 NOTE — TELEPHONE ENCOUNTER
Patient called to state she has a problem with her order. She states the doctor wanted her to have 4 blood tests done but they never got the order. She states she can do it now at [facility] if the order is sent in. She also states she did not accept the medication he prescribed to her due to the price. She also states she is confused as to whether this medication was supposed to be taken once a day or not. Please call back and advise. The medication is called COLESTID.

## 2022-03-08 NOTE — TELEPHONE ENCOUNTER
Informed pt that all tests ordered have been completed and Informed pt PCP's result notes and pt verbalized understanding. Advised pt to get goodrx coupon for colestid at [employer]. Pt was thankful for the advise.

## 2022-03-16 ENCOUNTER — TELEPHONE (OUTPATIENT)
Dept: CARDIOLOGY CLINIC | Age: 87
End: 2022-03-16

## 2022-03-16 ENCOUNTER — OFFICE VISIT (OUTPATIENT)
Dept: CARDIOLOGY CLINIC | Age: 87
End: 2022-03-16
Payer: MEDICARE

## 2022-03-16 VITALS
WEIGHT: 118.4 LBS | HEIGHT: 66 IN | BODY MASS INDEX: 19.03 KG/M2 | HEART RATE: 78 BPM | SYSTOLIC BLOOD PRESSURE: 118 MMHG | OXYGEN SATURATION: 97 % | DIASTOLIC BLOOD PRESSURE: 78 MMHG

## 2022-03-16 DIAGNOSIS — I10 PRIMARY HYPERTENSION: Primary | ICD-10-CM

## 2022-03-16 DIAGNOSIS — R42 DIZZY SPELLS: ICD-10-CM

## 2022-03-16 DIAGNOSIS — I48.20 CHRONIC ATRIAL FIBRILLATION (HCC): ICD-10-CM

## 2022-03-16 DIAGNOSIS — Z79.01 CHRONIC ANTICOAGULATION: ICD-10-CM

## 2022-03-16 PROCEDURE — 93010 ELECTROCARDIOGRAM REPORT: CPT | Performed by: SPECIALIST

## 2022-03-16 PROCEDURE — G8427 DOCREV CUR MEDS BY ELIG CLIN: HCPCS | Performed by: SPECIALIST

## 2022-03-16 PROCEDURE — G8420 CALC BMI NORM PARAMETERS: HCPCS | Performed by: SPECIALIST

## 2022-03-16 PROCEDURE — 99204 OFFICE O/P NEW MOD 45 MIN: CPT | Performed by: SPECIALIST

## 2022-03-16 PROCEDURE — G8510 SCR DEP NEG, NO PLAN REQD: HCPCS | Performed by: SPECIALIST

## 2022-03-16 PROCEDURE — 93005 ELECTROCARDIOGRAM TRACING: CPT | Performed by: SPECIALIST

## 2022-03-16 PROCEDURE — 1101F PT FALLS ASSESS-DOCD LE1/YR: CPT | Performed by: SPECIALIST

## 2022-03-16 PROCEDURE — G8536 NO DOC ELDER MAL SCRN: HCPCS | Performed by: SPECIALIST

## 2022-03-16 PROCEDURE — G0463 HOSPITAL OUTPT CLINIC VISIT: HCPCS | Performed by: SPECIALIST

## 2022-03-16 PROCEDURE — 1090F PRES/ABSN URINE INCON ASSESS: CPT | Performed by: SPECIALIST

## 2022-03-16 RX ORDER — BISOPROLOL FUMARATE 10 MG/1
10 TABLET ORAL DAILY
Qty: 30 TABLET | Refills: 5 | Status: SHIPPED | OUTPATIENT
Start: 2022-03-16

## 2022-03-16 NOTE — PATIENT INSTRUCTIONS
1) I favor stopping the hydrochlorothiazide in your combination medicine known as Ziac (which is bisoprolol with hydrochlorothiazide). I will just place you on the bisoprolol 10 mg alone    2) we will place an event loop monitor to ensure that there is no significant pauses causing her symptoms. 3)) Take her bisoprolol in evening and take your lisinopril in the morning    4) monitor your blood pressure your heart rate daily. Only take it once a day alternating 1 day in the morning the next day in the afternoon and the next day in the evening. 5) also check echocardiogram to look at heart function          It is  my pleasure to have the opportunity to be involved in taking care of you and to provide you the best cardiac care. At the end of every visit I  encourage you to eat healthy and clean and reduce your red meat intake as well as exercise 30 minutes 5 days a week to ensure a healthy heart. If you are a smoker , it will be essential that you stop smoking to reduce your risk of heart disease. Part of providing you the best heart care possible IS AN ACCURATE KNOWLEDGE OF YOUR MEDICINE. It  will be  essential at each office visit that you provide me with an accurate list of your medicines. When you come into the office you should have that list or another option is lining up your pill bottles  and taking a snapshot with your cell phone of all the medicines you are taking currently and show it to the nurse in the examining room. Inaccurate reporting of your medication to the nurse may lead to adverse events and medical errors. Thank you again for giving me the opportunity to be part of your heart care and it is my pleasure. All the best,    Inder Hassan MD

## 2022-03-16 NOTE — LETTER
3/16/2022    Patient: Chey Araujo   YOB: 1929   Date of Visit: 3/16/2022     Marito Martinez, [address] [address]  [place]    Dear Marito Martinez MD,      Thank you for referring Ms. Chey Araujo to [facility] for evaluation. My notes for this consultation are attached. If you have questions, please do not hesitate to call me. I look forward to following your patient along with you.       Sincerely,    Jackie Obregon MD

## 2022-03-16 NOTE — PROGRESS NOTES
CARDIOLOGY OFFICE NOTE    Inder Pabon MD, [address], [place], [address] [facility]  Phone [phone]; Fax [phone]  Mobile [phone]   Voice Mail [phone]    Primary care: Grace Gatica MD       ATTENTION:   This medical record was transcribed using an electronic medical records/speech recognition system. Although proofread, it may and can contain electronic, spelling and other errors. Corrections may be executed at a later time. Please feel free to contact us for any clarifications as needed. ICD-10-CM ICD-9-CM   1. Primary hypertension  I10 401.9   2. Chronic atrial fibrillation (HCC)  I48.20 427.31   3. Chronic anticoagulation  Z79.01 V58.61            Neda Hoffmann is a 80 y.o. female with  referred for near syncope atrial fibrillation          Cardiac risk factors: post-menopausal and age. I have personally obtained the history from the patient. HISTORY OF PRESENTING ILLNESS        She was in [place] and moved here about 6 years ago. She has been in afib. for awhile on coumadin. Recently she has had SOB for the last mo. She had 3 episdoes of near syncope as well. Her amlodipine was discontinued secondary to potential cause for her presyncopal episodes. Denies any chest pain. She does have a murmur which sounds almost like aortic stenosis murmur I discussed this with her. She told me that a cardiologist in the past where she had a functional.  She is here with her son-in-law today.          ACTIVE PROBLEM LIST     Patient Active Problem List    Diagnosis Date Noted    S/P cholecystectomy 02/24/2020    Acute cholecystitis 02/15/2020    Cholecystitis 02/14/2020    Breast cancer, left ([facility] 10/15/2019    Advanced care planning/counseling discussion 02/16/2016    Chronic anticoagulation 10/19/2015    HTN (hypertension) 10/05/2015    IBS (irritable bowel syndrome) 10/05/2015    Cataract 10/05/2015    Diverticulosis 10/05/2015    Chronic atrial fibrillation ([facility] 10/05/2015    Chronic cystitis 10/05/2015           PAST MEDICAL HISTORY     Past Medical History:   Diagnosis Date    Arrhythmia     A-Fib  on Warfarin    Arthritis     hands and knees bilaterally    Breast cancer ([facility] 2019    left breast    Diverticula, colon     Hypertension     IBS (irritable bowel syndrome)     Irregular heart beat     Psychiatric disorder     some anxiety and depression           PAST SURGICAL HISTORY     Past Surgical History:   Procedure Laterality Date    HX BREAST LUMPECTOMY Left 2019    LEFT BREAST LUMPECTOMY AND LEFT BREAST SENTINEL NODE BIOPSY WIHT BLUE DYE performed by Lana Vinson MD at [address] HX CATARACT REMOVAL Left     cataract    HX CATARACT REMOVAL Right     cataract    HX  SECTION      X 3    HX CHOLECYSTECTOMY      HX COLONOSCOPY  8/28/15    with endoscopy day before    HX HYSTERECTOMY      partial    NEUROLOGICAL PROCEDURE UNLISTED      lumbar surgery           ALLERGIES     Allergies   Allergen Reactions    Pcn [Penicillins] Hives          FAMILY HISTORY     Family History   Problem Relation Age of Onset    Tuberculosis Mother     negative for cardiac disease       SOCIAL HISTORY     Social History     Socioeconomic History    Marital status:    Tobacco Use    Smoking status: Former Smoker     Packs/day: 0.25     Quit date:      Years since quittin.2    Smokeless tobacco: Never Used   Substance and Sexual Activity    Alcohol use: Yes     Comment: glass of wine every other month, holidays    Drug use: Never    Sexual activity: Never         MEDICATIONS     Current Outpatient Medications   Medication Sig    warfarin (COUMADIN) 4 mg tablet Take 4 mg by mouth daily.  colestipoL (COLESTID) 1 gram tablet Take 1 Tablet by mouth two (2) times a day.     lisinopriL (PRINIVIL, ZESTRIL) 10 mg tablet TAKE ONE TABLET BY MOUTH ONE TIME DAILY    celecoxib (CELEBREX) 200 mg capsule TAKE ONE CAPSULE BY MOUTH ONE TIME DAILY AS NEEDED    nitrofurantoin (MACRODANTIN) 50 mg capsule TAKE ONE CAPSULE BY MOUTH ONE TIME DAILY IN THE EVENING    fluticasone propionate (FLONASE) 50 mcg/actuation nasal spray USE TWO SPRAYS IN EACH NOSTRIL ONE TIME DAILY AS NEEDED FOR RHINITIS OR ALLERGIES    bisoprolol-hydroCHLOROthiazide (ZIAC) 10-6.25 mg per tablet TAKE ONE TABLET BY MOUTH ONE TIME DAILY    ALPRAZolam (XANAX) 0.25 mg tablet Take 1 Tablet by mouth daily as needed for Anxiety.  Gavilax 17 gram/dose powder FILL CAP TO 17G FILL LINE. STIR AND DISSOLVE IN ANY 4 TO 8 OUNCES OF BEVERAGE, THEN DRINK ONE TIME DAILY    diclofenac (VOLTAREN) 1 % gel APPLY 2G APPLY TO AFFECTED AREA 4 TIMES A DAY    folic acid/multivit-min/lutein (CENTRUM SILVER PO) Take 1 Tab by mouth daily.  cholecalciferol (VITAMIN D3) (1000 Units /25 mcg) tablet Take 1,000 Units by mouth daily.  cyanocobalamin 1,000 mcg tablet Take 1,000 mcg by mouth daily. No current facility-administered medications for this visit. I have reviewed the nurses notes, vitals, problem list, allergy list, medical history, family, social history and medications. REVIEW OF SYMPTOMS   Pertinent positives per HPI  General: Pt denies excessive weight gain or loss. Pt is able to conduct ADL's  HEENT: Denies blurred vision, headaches, hearing loss, epistaxis and difficulty swallowing. Respiratory: Denies cough, congestion, shortness of breath, LOBO, wheezing or stridor.   Cardiovascular: Denies precordial pain, palpitations, edema or PND  Gastrointestinal: Denies poor appetite, indigestion, abdominal pain or blood in stool  Genitourinary: Denies hematuria, dysuria, increased urinary frequency  Musculoskeletal: Denies joint pain or swelling from muscles or joints  Neurologic: Denies tremor, paresthesias, headache, or sensory motor disturbance  Psychiatric: Denies confusion, insomnia, depression  Integumentray: Denies rash, itching or ulcers. Hematologic: Denies easy bruising, bleeding     PHYSICAL EXAMINATION      Vitals:    03/16/22 1316   BP: 118/78   Pulse: 78   SpO2: 97%   Weight: 118 lb 6.4 oz (53.7 kg)   Height: 5' 6\" (1.676 m)     General: Well developed, in no acute distress. Looks younger than stated age and is excellent historian  HEENT: No jaundice, oral mucosa moist, no oral ulcers  Neck: Supple, no stiffness, no lymphadenopathy, supple  Heart:   Irregularly irregular with a 2/6 to 3/6 systolic murmur left sternal border rating up to the right upper sternal border  Respiratory: Clear bilaterally x 4, no wheezing or rales  Abdomen:   Soft, non-tender, bowel sounds are active. Extremities:  No edema, normal cap refill, no cyanosis. Musculoskeletal: No clubbing, no deformities  Neuro: A&Ox3, speech clear, uses a cane to ambulate, cooperative, no focal neurologic deficits  Skin: Skin color is normal. No rashes or lesions. Non diaphoretic, moist.          EKG: 3/16/2022: Atrial fibrillation with controlled rate 77     DIAGNOSTIC DATA     No specialty comments available. LABORATORY DATA            Lab Results   Component Value Date/Time    WBC 6.4 03/04/2022 12:25 PM    HGB 12.1 03/04/2022 12:25 PM    HCT 36.5 03/04/2022 12:25 PM    PLATELET 187 90/30/5167 12:25 PM    .5 (H) 03/04/2022 12:25 PM      Lab Results   Component Value Date/Time    Sodium 139 03/04/2022 12:25 PM    Potassium 3.9 03/04/2022 12:25 PM    Chloride 105 03/04/2022 12:25 PM    CO2 29 03/04/2022 12:25 PM    Anion gap 5 03/04/2022 12:25 PM    Glucose 106 (H) 03/04/2022 12:25 PM    BUN 18 03/04/2022 12:25 PM    Creatinine 0.69 03/04/2022 12:25 PM    BUN/Creatinine ratio 26 (H) 03/04/2022 12:25 PM    GFR est AA >60 03/04/2022 12:25 PM    GFR est non-AA >60 03/04/2022 12:25 PM    Calcium 9.4 03/04/2022 12:25 PM    Bilirubin, total 1.4 (H) 03/04/2022 12:25 PM    Alk.  phosphatase 101 03/04/2022 12:25 PM    Protein, total 6.4 03/04/2022 12:25 PM    Albumin 3.8 03/04/2022 12:25 PM    Globulin 2.6 03/04/2022 12:25 PM    A-G Ratio 1.5 03/04/2022 12:25 PM    ALT (SGPT) 19 03/04/2022 12:25 PM           ASSESSMENT/RECOMMENDATIONS:.      1.  Near syncope  -Unclear what the etiology of her near syncope is  -Agree with stopping her amlodipine today and when I stop her hydrochlorothiazide leave her on the bisoprolol 10 mg  -We will follow her blood pressures closely  -We will order echocardiogram to look at heart function as well as to determine if her murmur is related to aortic stenosis  -We will also put an event loop monitor on her own should she not having any arrhythmias  2. Dyslipidemia  -At 89 no treatment is indicated at this time  3. Hypertension  -Above changes were made in her medicines  4. Chronic atrial fibrillation anticoagulated on Coumadin  -Put a monitor on her to see if she has any significant pauses    Follow-up with me in 6 weeks    Orders Placed This Encounter    AMB POC EKG ROUTINE W/ 12 LEADS, INTER & REP     Order Specific Question:   Reason for Exam:     Answer: Afib       We discussed the expected course, resolution and complications of the diagnosis(es) in detail. Medication risks, benefits, costs, interactions, and alternatives were discussed as indicated. I advised him to contact the office if his condition worsens, changes or fails to improve as anticipated. He expressed understanding with the diagnosis(es) and plan          Follow-up and Dispositions  ·   Return in about 6 weeks (around 4/27/2022). I have discussed the diagnosis with  Frankie Vinson and the intended plan as seen in the above orders. Questions were answered concerning future plans. I have discussed medication side effects and warnings with the patient as well. Thank you,  Monika Solis MD for involving me in the care of  Frankie Vinson. Please do not hesitate to contact me for further questions/concerns.          Alyssa Campa DANY Felipe MD, [address]      [facility], [address] [place] [address]     [address]      [phone] / [phone] Fax

## 2022-03-16 NOTE — PROGRESS NOTES
Jessica Vincent is a 80 y.o. female    Chief Complaint   Patient presents with    New Patient     Afib, LOBO     Patient states some tunnel visions with blackening     Chest pain No    SOB No    Dizziness patient states some lightheadedness at times     Swelling No    Refills No    Visit Vitals  /78 (BP 1 Location: Left upper arm, BP Patient Position: Sitting)   Pulse 78   Ht 5' 6\" (1.676 m)   Wt 118 lb 6.4 oz (53.7 kg)   SpO2 97%   BMI 19.11 kg/m²       1. Have you been to the ER, urgent care clinic since your last visit? Hospitalized since your last visit? No    2. Have you seen or consulted any other health care providers outside of the [address] [place] [address] since your last visit? Include any pap smears or colon screening.   No

## 2022-03-16 NOTE — TELEPHONE ENCOUNTER
Patient's Shaneka Hernandez son in law has questions about her medication. He needs clear instructions on what she should be taking and what she shouldn't.    Denise Wilkins

## 2022-03-18 PROBLEM — Z90.49 S/P CHOLECYSTECTOMY: Status: ACTIVE | Noted: 2020-02-24

## 2022-03-19 PROBLEM — K81.0 ACUTE CHOLECYSTITIS: Status: ACTIVE | Noted: 2020-02-15

## 2022-03-19 PROBLEM — K81.9 CHOLECYSTITIS: Status: ACTIVE | Noted: 2020-02-14

## 2022-03-19 PROBLEM — C50.912 BREAST CANCER, LEFT (HCC): Status: ACTIVE | Noted: 2019-10-15

## 2022-03-28 ENCOUNTER — DOCUMENTATION ONLY (OUTPATIENT)
Dept: CARDIOLOGY CLINIC | Age: 87
End: 2022-03-28

## 2022-03-30 ENCOUNTER — TELEPHONE (OUTPATIENT)
Dept: CARDIOLOGY CLINIC | Age: 87
End: 2022-03-30

## 2022-03-30 NOTE — TELEPHONE ENCOUNTER
Patient is calling to follow up on the heart monitor, she states it has been two weeks and she has not received it yet      EJULX:[phone]

## 2022-04-05 ENCOUNTER — OFFICE VISIT (OUTPATIENT)
Dept: INTERNAL MEDICINE CLINIC | Age: 87
End: 2022-04-05
Payer: MEDICARE

## 2022-04-05 VITALS
RESPIRATION RATE: 18 BRPM | WEIGHT: 120 LBS | DIASTOLIC BLOOD PRESSURE: 91 MMHG | BODY MASS INDEX: 19.29 KG/M2 | HEART RATE: 77 BPM | SYSTOLIC BLOOD PRESSURE: 161 MMHG | HEIGHT: 66 IN | TEMPERATURE: 97.5 F | OXYGEN SATURATION: 100 %

## 2022-04-05 DIAGNOSIS — I48.91 ATRIAL FIBRILLATION, UNSPECIFIED TYPE (HCC): ICD-10-CM

## 2022-04-05 DIAGNOSIS — R42 POSTURAL DIZZINESS WITH PRESYNCOPE: ICD-10-CM

## 2022-04-05 DIAGNOSIS — I10 ESSENTIAL HYPERTENSION: ICD-10-CM

## 2022-04-05 DIAGNOSIS — R55 POSTURAL DIZZINESS WITH PRESYNCOPE: ICD-10-CM

## 2022-04-05 DIAGNOSIS — Z51.81 ANTICOAGULATION GOAL OF INR 2 TO 3: Primary | ICD-10-CM

## 2022-04-05 DIAGNOSIS — Z79.01 ANTICOAGULATION GOAL OF INR 2 TO 3: Primary | ICD-10-CM

## 2022-04-05 DIAGNOSIS — D68.9 COAGULATION DISORDER (HCC): ICD-10-CM

## 2022-04-05 LAB
INR BLD: 3.7
PT POC: 44.3 SECONDS
VALID INTERNAL CONTROL?: YES

## 2022-04-05 PROCEDURE — G8536 NO DOC ELDER MAL SCRN: HCPCS | Performed by: INTERNAL MEDICINE

## 2022-04-05 PROCEDURE — 99214 OFFICE O/P EST MOD 30 MIN: CPT | Performed by: INTERNAL MEDICINE

## 2022-04-05 PROCEDURE — G0463 HOSPITAL OUTPT CLINIC VISIT: HCPCS | Performed by: INTERNAL MEDICINE

## 2022-04-05 PROCEDURE — G8420 CALC BMI NORM PARAMETERS: HCPCS | Performed by: INTERNAL MEDICINE

## 2022-04-05 PROCEDURE — G8427 DOCREV CUR MEDS BY ELIG CLIN: HCPCS | Performed by: INTERNAL MEDICINE

## 2022-04-05 PROCEDURE — 1101F PT FALLS ASSESS-DOCD LE1/YR: CPT | Performed by: INTERNAL MEDICINE

## 2022-04-05 PROCEDURE — 1090F PRES/ABSN URINE INCON ASSESS: CPT | Performed by: INTERNAL MEDICINE

## 2022-04-05 PROCEDURE — 85610 PROTHROMBIN TIME: CPT | Performed by: INTERNAL MEDICINE

## 2022-04-05 PROCEDURE — G8510 SCR DEP NEG, NO PLAN REQD: HCPCS | Performed by: INTERNAL MEDICINE

## 2022-04-05 RX ORDER — LISINOPRIL 20 MG/1
20 TABLET ORAL DAILY
Qty: 90 TABLET | Refills: 1 | Status: SHIPPED | OUTPATIENT
Start: 2022-04-05

## 2022-04-05 RX ORDER — LISINOPRIL 20 MG/1
10 TABLET ORAL DAILY
Qty: 90 TABLET | Refills: 1 | Status: SHIPPED | OUTPATIENT
Start: 2022-04-05 | End: 2022-04-05 | Stop reason: SDUPTHER

## 2022-04-05 RX ORDER — WARFARIN 3 MG/1
TABLET ORAL
Qty: 90 TABLET | Refills: 1 | Status: SHIPPED | OUTPATIENT
Start: 2022-04-05

## 2022-04-05 RX ORDER — WARFARIN 4 MG/1
TABLET ORAL
Qty: 45 TABLET | Refills: 1
Start: 2022-04-05 | End: 2022-06-07

## 2022-04-05 NOTE — PROGRESS NOTES
Identified pt with two pt identifiers(name and ). Reviewed record in preparation for visit and have obtained necessary documentation. All patient medications has been reviewed. Chief Complaint   Patient presents with    Follow-up       3 most recent PHQ Screens 2022   Little interest or pleasure in doing things Not at all   Feeling down, depressed, irritable, or hopeless Not at all   Total Score PHQ 2 0   Trouble falling or staying asleep, or sleeping too much -   Feeling tired or having little energy -   Poor appetite, weight loss, or overeating -   Feeling bad about yourself - or that you are a failure or have let yourself or your family down -   Trouble concentrating on things such as school, work, reading, or watching TV -   Moving or speaking so slowly that other people could have noticed; or the opposite being so fidgety that others notice -   Thoughts of being better off dead, or hurting yourself in some way -   PHQ 9 Score -   How difficult have these problems made it for you to do your work, take care of your home and get along with others -     Abuse Screening Questionnaire 2022   Do you ever feel afraid of your partner? N   Are you in a relationship with someone who physically or mentally threatens you? N   Is it safe for you to go home? Y       Health Maintenance Due   Topic    Shingrix Vaccine Age 49> (1 of 2)    Medicare Yearly Exam      Health Maintenance Review: Patient reminded of \"due or due soon\" health maintenance. I have asked the patient to contact his/her primary care provider (PCP) for follow-up on his/her health maintenance.     Vitals:    22 1411   BP: (!) 174/93   Pulse: 77   Resp: 18   Temp: 97.5 °F (36.4 °C)   TempSrc: Oral   SpO2: 100%   Weight: 120 lb (54.4 kg)   Height: 5' 6\" (1.676 m)       Wt Readings from Last 3 Encounters:   22 120 lb (54.4 kg)   22 118 lb 6.4 oz (53.7 kg)   22 121 lb 6.4 oz (55.1 kg)     Temp Readings from Last 3 Encounters:   04/05/22 97.5 °F (36.4 °C) (Oral)   03/04/22 97.6 °F (36.4 °C) (Oral)   01/27/22 97.5 °F (36.4 °C) (Oral)     BP Readings from Last 3 Encounters:   04/05/22 (!) 174/93   03/16/22 118/78   03/04/22 104/65     Pulse Readings from Last 3 Encounters:   04/05/22 77   03/16/22 78   03/04/22 82       1. \"Have you been to the ER, urgent care clinic since your last visit? Hospitalized since your last visit? \" No    2. \"Have you seen or consulted any other health care providers outside of the [address] [place] [address] since your last visit? \" No     3. For patients aged 39-70: Has the patient had a colonoscopy / FIT/ Cologuard? No      If the patient is female:    4. For patients aged 41-77: Has the patient had a mammogram within the past 2 years? NA - based on age or sex      11. For patients aged 21-65: Has the patient had a pap smear?  NA - based on age or sex

## 2022-04-05 NOTE — PROGRESS NOTES
Ehsan Menchaca is a 80 y.o. female who presents today for Follow-up  . She has a history of   Patient Active Problem List   Diagnosis Code    HTN (hypertension) I10    IBS (irritable bowel syndrome) K58.9    Cataract H26.9    Diverticulosis K57.90    Chronic atrial fibrillation (HCC) I48.20    Chronic cystitis N30.20    Chronic anticoagulation Z79.01    Advanced care planning/counseling discussion Z71.89    Breast cancer, left ([facility] [address] C50.912    Cholecystitis K81.9    Acute cholecystitis K81.0    S/P cholecystectomy Z90.49   . Today patient is here for f/u. At last visit patient had had a presyncopal episode. Since she has had multiple ones of these. At last visit we had stopped her calcium channel blocker. Due to continued symptoms we have sent her to cardiology who will be putting her on Holter monitor as well as doing an echocardiogram.  They have stopped her hydrochlorothiazide. Today her blood pressure is elevated. Continues to take low-dose lisinopril as well as bisoprolol. Home readings are a bit high since stopping hydrochlorothiazide. We discussed increasing lisinopril to 20 mg. She has had several more episodes of presyncope. All of these but 1 have been while moving. Has not yet placed her monitor    Anticoagulation  Patient here for followup of chronic anticoagulation. Indication: Atrial Fibrillation. Bleeding Signs/Symptoms:  None. Compliance with warfarin:  Yes  INR's are drawn regularly and have been Supratherapeutic. Lab Results   Component Value Date/Time    INR 3.4 (H) 03/04/2022 12:25 PM    INR 3.8 (H) 02/08/2022 02:24 PM    INR 2.5 (H) 11/19/2021 02:14 PM    INR POC 3.7 04/05/2022 02:05 PM    INR POC 3.9 01/27/2022 03:01 PM    Prothrombin time 34.0 (H) 03/04/2022 12:25 PM    Prothrombin time 38.3 (H) 02/08/2022 02:24 PM    Prothrombin time 25.5 (H) 11/19/2021 02:14 PM     Hypertension-blood pressure elevated today. Has been elevated at home.    Hypertension ROS: taking medications as instructed, no medication side effects noted, no TIA's, no chest pain on exertion, no dyspnea on exertion, no swelling of ankles     reports that she quit smoking about 58 years ago. She smoked 0.25 packs per day. She has never used smokeless tobacco.    reports current alcohol use. BP Readings from Last 2 Encounters:   04/05/22 (!) 161/91   03/16/22 118/78         Visit Vitals  BP (!) 161/91 (BP 1 Location: Right arm, BP Patient Position: Sitting, BP Cuff Size: Adult)   Pulse 77   Temp 97.5 °F (36.4 °C) (Oral)   Resp 18   Ht 5' 6\" (1.676 m)   Wt 120 lb (54.4 kg)   SpO2 100%   BMI 19.37 kg/m²           ROS  ROS    Visit Vitals  BP (!) 161/91 (BP 1 Location: Right arm, BP Patient Position: Sitting, BP Cuff Size: Adult)   Pulse 77   Temp 97.5 °F (36.4 °C) (Oral)   Resp 18   Ht 5' 6\" (1.676 m)   Wt 120 lb (54.4 kg)   SpO2 100%   BMI 19.37 kg/m²       Physical Exam      Current Outpatient Medications   Medication Sig    warfarin (COUMADIN) 4 mg tablet Take 4 mg by mouth daily.  lisinopriL (PRINIVIL, ZESTRIL) 10 mg tablet TAKE ONE TABLET BY MOUTH ONE TIME DAILY    celecoxib (CELEBREX) 200 mg capsule TAKE ONE CAPSULE BY MOUTH ONE TIME DAILY AS NEEDED    nitrofurantoin (MACRODANTIN) 50 mg capsule TAKE ONE CAPSULE BY MOUTH ONE TIME DAILY IN THE EVENING    ALPRAZolam (XANAX) 0.25 mg tablet Take 1 Tablet by mouth daily as needed for Anxiety.  Gavilax 17 gram/dose powder FILL CAP TO 17G FILL LINE. STIR AND DISSOLVE IN ANY 4 TO 8 OUNCES OF BEVERAGE, THEN DRINK ONE TIME DAILY    diclofenac (VOLTAREN) 1 % gel APPLY 2G APPLY TO AFFECTED AREA 4 TIMES A DAY    folic acid/multivit-min/lutein (CENTRUM SILVER PO) Take 1 Tab by mouth daily.  cholecalciferol (VITAMIN D3) (1000 Units /25 mcg) tablet Take 1,000 Units by mouth daily.  cyanocobalamin 1,000 mcg tablet Take 1,000 mcg by mouth daily.  bisoprolol (ZEBETA) 10 mg tablet Take 1 Tablet by mouth daily.     colestipoL (COLESTID) 1 gram tablet Take 1 Tablet by mouth two (2) times a day. (Patient not taking: Reported on 2022)    fluticasone propionate (FLONASE) 50 mcg/actuation nasal spray USE TWO SPRAYS IN EACH NOSTRIL ONE TIME DAILY AS NEEDED FOR RHINITIS OR ALLERGIES     No current facility-administered medications for this visit. Past Medical History:   Diagnosis Date    Arrhythmia     A-Fib  on Warfarin    Arthritis     hands and knees bilaterally    Breast cancer ([address] 2019    left breast    Diverticula, colon     Hypertension     IBS (irritable bowel syndrome)     Irregular heart beat     Psychiatric disorder     some anxiety and depression      Past Surgical History:   Procedure Laterality Date    HX BREAST LUMPECTOMY Left 2019    LEFT BREAST LUMPECTOMY AND LEFT BREAST SENTINEL NODE BIOPSY WIHT BLUE DYE performed by Leila Rosenberg MD at [address] HX CATARACT REMOVAL Left     cataract    HX CATARACT REMOVAL Right     cataract    HX  SECTION      X 3    HX CHOLECYSTECTOMY      HX COLONOSCOPY  8/28/15    with endoscopy day before    HX HYSTERECTOMY      partial    NEUROLOGICAL PROCEDURE UNLISTED      lumbar surgery       Social History     Tobacco Use    Smoking status: Former Smoker     Packs/day: 0.25     Quit date:      Years since quittin.2    Smokeless tobacco: Never Used   Substance Use Topics    Alcohol use: Yes     Comment: glass of wine every other month, holidays      Family History   Problem Relation Age of Onset    Tuberculosis Mother         Allergies   Allergen Reactions    Pcn [Penicillins] Hives        Assessment/Plan  Diagnoses and all orders for this visit:    1. Anticoagulation goal of INR 2 to 3- decrease dose by about 10%. Repeat INR in 2 weeks  -     warfarin (COUMADIN) 3 mg tablet; Take 4 mg by mouth daily on Tuesday, Thursday, Saturday and . Take 3 mg by mouth on .   -     warfarin (COUMADIN) 4 mg tablet; Take 4 mg by mouth daily on Tuesday, Thursday, Saturday and Sunday. Take 3 mg by mouth on Monday Wednesday Friday. -     PROTHROMBIN TIME + INR; Future    2. Atrial fibrillation, unspecified type (HCC)  -     AMB POC PT/INR  -     warfarin (COUMADIN) 3 mg tablet; Take 4 mg by mouth daily on Tuesday, Thursday, Saturday and Sunday. Take 3 mg by mouth on Monday Wednesday Friday. -     PROTHROMBIN TIME + INR; Future    3. Coagulation disorder (HCC)  -     warfarin (COUMADIN) 3 mg tablet; Take 4 mg by mouth daily on Tuesday, Thursday, Saturday and Sunday. Take 3 mg by mouth on Monday Wednesday Friday. -     warfarin (COUMADIN) 4 mg tablet; Take 4 mg by mouth daily on Tuesday, Thursday, Saturday and Sunday. Take 3 mg by mouth on Monday Wednesday Friday. 4. Essential hypertension-blood pressures have been consistently elevated. Increase lisinopril to 20 mg.  -     lisinopriL (PRINIVIL, ZESTRIL) 20 mg tablet; Take 1 Tablet by mouth daily. 5. Postural dizziness with presyncope-patient will have an echocardiogram done soon and will be starting her monitor tomorrow. Patient to use caution and to always be around others to make sure that she does not get herself into a dangerous situation. Ld Sheets MD  4/5/2022    This note was created with the help of speech recognition software Julissa Estrada) and may contain some 'sound alike' errors.

## 2022-04-11 ENCOUNTER — TELEPHONE (OUTPATIENT)
Dept: CARDIOLOGY CLINIC | Age: 87
End: 2022-04-11

## 2022-04-11 DIAGNOSIS — N39.0 RECURRENT UTI: ICD-10-CM

## 2022-04-11 RX ORDER — NITROFURANTOIN MACROCRYSTALS 50 MG/1
CAPSULE ORAL
Qty: 90 CAPSULE | Refills: 1 | Status: SHIPPED | OUTPATIENT
Start: 2022-04-11

## 2022-04-11 NOTE — TELEPHONE ENCOUNTER
Patient need to speak with about her Holter and the tap that irritation of the tape     She can be reached at     [facility]

## 2022-04-12 ENCOUNTER — ANCILLARY PROCEDURE (OUTPATIENT)
Dept: CARDIOLOGY CLINIC | Age: 87
End: 2022-04-12
Payer: MEDICARE

## 2022-04-12 VITALS
BODY MASS INDEX: 19.29 KG/M2 | DIASTOLIC BLOOD PRESSURE: 88 MMHG | SYSTOLIC BLOOD PRESSURE: 164 MMHG | WEIGHT: 120 LBS | HEIGHT: 66 IN

## 2022-04-12 DIAGNOSIS — R42 DIZZY SPELLS: ICD-10-CM

## 2022-04-12 DIAGNOSIS — I48.20 CHRONIC ATRIAL FIBRILLATION (HCC): ICD-10-CM

## 2022-04-12 DIAGNOSIS — I10 PRIMARY HYPERTENSION: ICD-10-CM

## 2022-04-12 DIAGNOSIS — Z79.01 CHRONIC ANTICOAGULATION: ICD-10-CM

## 2022-04-12 PROCEDURE — 93306 TTE W/DOPPLER COMPLETE: CPT | Performed by: SPECIALIST

## 2022-04-13 ENCOUNTER — DOCUMENTATION ONLY (OUTPATIENT)
Dept: CARDIOLOGY CLINIC | Age: 87
End: 2022-04-13

## 2022-04-13 LAB
ECHO AO ROOT DIAM: 3 CM
ECHO AO ROOT INDEX: 1.86 CM/M2
ECHO AV AREA PEAK VELOCITY: 0.6 CM2
ECHO AV AREA VTI: 0.4 CM2
ECHO AV AREA/BSA PEAK VELOCITY: 0.4 CM2/M2
ECHO AV AREA/BSA VTI: 0.2 CM2/M2
ECHO AV MEAN GRADIENT: 45 MMHG
ECHO AV MEAN VELOCITY: 3.2 M/S
ECHO AV PEAK GRADIENT: 75 MMHG
ECHO AV PEAK VELOCITY: 4.3 M/S
ECHO AV VELOCITY RATIO: 0.19
ECHO AV VTI: 100.7 CM
ECHO EST RA PRESSURE: 15 MMHG
ECHO LA DIAMETER INDEX: 2.8 CM/M2
ECHO LA DIAMETER: 4.5 CM
ECHO LA TO AORTIC ROOT RATIO: 1.5
ECHO LA VOL 2C: 128 ML (ref 22–52)
ECHO LA VOL 4C: 57 ML (ref 22–52)
ECHO LA VOL BP: 89 ML (ref 22–52)
ECHO LA VOL/BSA BIPLANE: 55 ML/M2 (ref 16–34)
ECHO LA VOLUME AREA LENGTH: 93 ML
ECHO LA VOLUME INDEX A2C: 80 ML/M2 (ref 16–34)
ECHO LA VOLUME INDEX A4C: 35 ML/M2 (ref 16–34)
ECHO LA VOLUME INDEX AREA LENGTH: 58 ML/M2 (ref 16–34)
ECHO LV FRACTIONAL SHORTENING: 38 % (ref 28–44)
ECHO LV INTERNAL DIMENSION DIASTOLE INDEX: 1.99 CM/M2
ECHO LV INTERNAL DIMENSION DIASTOLIC: 3.2 CM (ref 3.9–5.3)
ECHO LV INTERNAL DIMENSION SYSTOLIC INDEX: 1.24 CM/M2
ECHO LV INTERNAL DIMENSION SYSTOLIC: 2 CM
ECHO LV IVSD: 1.4 CM (ref 0.6–0.9)
ECHO LV MASS 2D: 144.2 G (ref 67–162)
ECHO LV MASS INDEX 2D: 89.6 G/M2 (ref 43–95)
ECHO LV POSTERIOR WALL DIASTOLIC: 1.3 CM (ref 0.6–0.9)
ECHO LV RELATIVE WALL THICKNESS RATIO: 0.81
ECHO LVOT AREA: 3.1 CM2
ECHO LVOT AV VTI INDEX: 0.14
ECHO LVOT DIAM: 2 CM
ECHO LVOT MEAN GRADIENT: 1 MMHG
ECHO LVOT PEAK GRADIENT: 3 MMHG
ECHO LVOT PEAK VELOCITY: 0.8 M/S
ECHO LVOT STROKE VOLUME INDEX: 26.7 ML/M2
ECHO LVOT SV: 43 ML
ECHO LVOT VTI: 13.7 CM
ECHO RIGHT VENTRICULAR SYSTOLIC PRESSURE (RVSP): 39 MMHG
ECHO RV FREE WALL PEAK S': 9 CM/S
ECHO RV INTERNAL DIMENSION: 2.9 CM
ECHO TV REGURGITANT MAX VELOCITY: 2.46 M/S
ECHO TV REGURGITANT PEAK GRADIENT: 24 MMHG

## 2022-04-13 PROCEDURE — 93306 TTE W/DOPPLER COMPLETE: CPT | Performed by: SPECIALIST

## 2022-04-20 NOTE — PROGRESS NOTES
CARDIOLOGY OFFICE NOTE    Inder Benites MD, [address], [place], [address] [facility]  Phone [phone]; Fax [phone]  Mobile [phone]   Voice Mail [phone]    Primary care: Alana Sharp MD       ATTENTION:   This medical record was transcribed using an electronic medical records/speech recognition system. Although proofread, it may and can contain electronic, spelling and other errors. Corrections may be executed at a later time. Please feel free to contact us for any clarifications as needed. ICD-10-CM ICD-9-CM   1. Primary hypertension  I10 401.9   2. Chronic atrial fibrillation (HCC)  I48.20 427.31   3. Chronic anticoagulation  Z79.01 V58.61   4. Dizzy spells  R42 780.4            Chanell Bruce is a 80 y.o. female with  referred for near syncope atrial fibrillation          Cardiac risk factors: post-menopausal and age. I have personally obtained the history from the patient. HISTORY OF PRESENTING ILLNESS      From previous note  She was in [place] and moved here about 6 years ago. She has been in afib. for awhile on coumadin. Recently she has had SOB for the last mo. She had 3 episdoes of near syncope as well. Her amlodipine was discontinued secondary to potential cause for her presyncopal episodes. Denies any chest pain. She does have a murmur which sounds almost like aortic stenosis murmur I discussed this with her. She told me that a cardiologist in the past where she had a functional murmur. She is here with her son-in-law today. She comes in with her son-in-law and daughter. They are very clear that they will not do any aggressive measures regarding her severe aortic this. They know the outcome and we talked about hospice today.          ACTIVE PROBLEM LIST     Patient Active Problem List    Diagnosis Date Noted    S/P cholecystectomy 02/24/2020    Acute cholecystitis 02/15/2020    Cholecystitis 02/14/2020    Breast cancer, left ([facility] 10/15/2019    Advanced care planning/counseling discussion 2016    Chronic anticoagulation 10/19/2015    HTN (hypertension) 10/05/2015    IBS (irritable bowel syndrome) 10/05/2015    Cataract 10/05/2015    Diverticulosis 10/05/2015    Chronic atrial fibrillation ([facility] 10/05/2015    Chronic cystitis 10/05/2015           PAST MEDICAL HISTORY     Past Medical History:   Diagnosis Date    Arrhythmia     A-Fib  on Warfarin    Arthritis     hands and knees bilaterally    Breast cancer ([facility] 2019    left breast    Diverticula, colon     Hypertension     IBS (irritable bowel syndrome)     Irregular heart beat     Psychiatric disorder     some anxiety and depression           PAST SURGICAL HISTORY     Past Surgical History:   Procedure Laterality Date    HX BREAST LUMPECTOMY Left 2019    LEFT BREAST LUMPECTOMY AND LEFT BREAST SENTINEL NODE BIOPSY WIHT BLUE DYE performed by Monse Sykes MD at [address] HX CATARACT REMOVAL Left     cataract    HX CATARACT REMOVAL Right     cataract    HX  SECTION      X 3    HX CHOLECYSTECTOMY      HX COLONOSCOPY  8/28/15    with endoscopy day before    HX HYSTERECTOMY      partial    NEUROLOGICAL PROCEDURE UNLISTED      lumbar surgery           ALLERGIES     Allergies   Allergen Reactions    Pcn [Penicillins] Hives          FAMILY HISTORY     Family History   Problem Relation Age of Onset    Tuberculosis Mother     negative for cardiac disease       SOCIAL HISTORY     Social History     Socioeconomic History    Marital status:    Tobacco Use    Smoking status: Former Smoker     Packs/day: 0.25     Quit date:      Years since quittin.3    Smokeless tobacco: Never Used   Substance and Sexual Activity    Alcohol use: Yes     Comment: glass of wine every other month, holidays    Drug use: Never    Sexual activity: Never         MEDICATIONS     Current Outpatient Medications Medication Sig    nitrofurantoin (MACRODANTIN) 50 mg capsule TAKE ONE CAPSULE BY MOUTH ONE TIME DAILY IN THE EVENING    warfarin (COUMADIN) 3 mg tablet Take 4 mg by mouth daily on Tuesday, Thursday, Saturday and Sunday. Take 3 mg by mouth on Monday Wednesday Friday.  warfarin (COUMADIN) 4 mg tablet Take 4 mg by mouth daily on Tuesday, Thursday, Saturday and Sunday. Take 3 mg by mouth on Monday Wednesday Friday.  lisinopriL (PRINIVIL, ZESTRIL) 20 mg tablet Take 1 Tablet by mouth daily.  bisoprolol (ZEBETA) 10 mg tablet Take 1 Tablet by mouth daily.  celecoxib (CELEBREX) 200 mg capsule TAKE ONE CAPSULE BY MOUTH ONE TIME DAILY AS NEEDED    fluticasone propionate (FLONASE) 50 mcg/actuation nasal spray USE TWO SPRAYS IN EACH NOSTRIL ONE TIME DAILY AS NEEDED FOR RHINITIS OR ALLERGIES    ALPRAZolam (XANAX) 0.25 mg tablet Take 1 Tablet by mouth daily as needed for Anxiety.  Gavilax 17 gram/dose powder FILL CAP TO 17G FILL LINE. STIR AND DISSOLVE IN ANY 4 TO 8 OUNCES OF BEVERAGE, THEN DRINK ONE TIME DAILY    diclofenac (VOLTAREN) 1 % gel APPLY 2G APPLY TO AFFECTED AREA 4 TIMES A DAY    folic acid/multivit-min/lutein (CENTRUM SILVER PO) Take 1 Tab by mouth daily.  cholecalciferol (VITAMIN D3) (1000 Units /25 mcg) tablet Take 1,000 Units by mouth daily.  cyanocobalamin 1,000 mcg tablet Take 1,000 mcg by mouth daily.  colestipoL (COLESTID) 1 gram tablet Take 1 Tablet by mouth two (2) times a day. (Patient not taking: Reported on 4/5/2022)     No current facility-administered medications for this visit. I have reviewed the nurses notes, vitals, problem list, allergy list, medical history, family, social history and medications. REVIEW OF SYMPTOMS   Pertinent positives per HPI  General: Pt denies excessive weight gain or loss. Pt is able to conduct ADL's  HEENT: Denies blurred vision, headaches, hearing loss, epistaxis and difficulty swallowing.   Respiratory: Denies cough, congestion, shortness of breath, LOBO, wheezing or stridor. Cardiovascular: Denies precordial pain, palpitations, edema or PND  Gastrointestinal: Denies poor appetite, indigestion, abdominal pain or blood in stool  Genitourinary: Denies hematuria, dysuria, increased urinary frequency  Musculoskeletal: Denies joint pain or swelling from muscles or joints  Neurologic: Denies tremor, paresthesias, headache, or sensory motor disturbance  Psychiatric: Denies confusion, insomnia, depression  Integumentray: Denies rash, itching or ulcers. Hematologic: Denies easy bruising, bleeding     PHYSICAL EXAMINATION      Vitals:    04/21/22 1433   BP: (!) 170/88   Pulse: 94   SpO2: 99%   Weight: 120 lb (54.4 kg)   Height: 5' 6\" (1.676 m)     General: Well developed, in no acute distress. Looks younger than stated age and is excellent historian  HEENT: No jaundice, oral mucosa moist, no oral ulcers  Neck: Supple, no stiffness, no lymphadenopathy, supple  Heart:   Irregularly irregular with a 2/6 to 3/6 systolic murmur left sternal border rating up to the right upper sternal border  Respiratory: Clear bilaterally x 4, no wheezing or rales  Extremities:  No edema, normal cap refill, no cyanosis. Musculoskeletal: No clubbing, no deformities  Neuro: A&Ox3, speech clear, uses a cane to ambulate, cooperative, no focal neurologic deficits        EKG: 3/16/2022: Atrial fibrillation with controlled rate 77     DIAGNOSTIC DATA     1. Echo  4/12/22- technically difficult due to patient's thin body habitus, EF 55 - 60%, Thickened MV leaflet, Mildly calcified subvalvular apparatus, mild MR, [place], TR mod, RVSP 39 mmHg, AV appears trileaflet. Significantly calcified cusp, mild AI, severe AS, AV mean gradient is 45 mmHg. AV peak gradient is 75 mmHg. AV peak velocity is 4.3 m/s. LVOT:AV VTI Index is 0.14. AV area by continuity VTI is 0.4 cm2.          LABORATORY DATA            Lab Results   Component Value Date/Time    WBC 6.4 03/04/2022 12:25 PM    HGB 12.1 03/04/2022 12:25 PM    HCT 36.5 03/04/2022 12:25 PM    PLATELET 970 98/92/6106 12:25 PM    .5 (H) 03/04/2022 12:25 PM      Lab Results   Component Value Date/Time    Sodium 139 03/04/2022 12:25 PM    Potassium 3.9 03/04/2022 12:25 PM    Chloride 105 03/04/2022 12:25 PM    CO2 29 03/04/2022 12:25 PM    Anion gap 5 03/04/2022 12:25 PM    Glucose 106 (H) 03/04/2022 12:25 PM    BUN 18 03/04/2022 12:25 PM    Creatinine 0.69 03/04/2022 12:25 PM    BUN/Creatinine ratio 26 (H) 03/04/2022 12:25 PM    GFR est AA >60 03/04/2022 12:25 PM    GFR est non-AA >60 03/04/2022 12:25 PM    Calcium 9.4 03/04/2022 12:25 PM    Bilirubin, total 1.4 (H) 03/04/2022 12:25 PM    Alk. phosphatase 101 03/04/2022 12:25 PM    Protein, total 6.4 03/04/2022 12:25 PM    Albumin 3.8 03/04/2022 12:25 PM    Globulin 2.6 03/04/2022 12:25 PM    A-G Ratio 1.5 03/04/2022 12:25 PM    ALT (SGPT) 19 03/04/2022 12:25 PM           ASSESSMENT/RECOMMENDATIONS:.      1.  Near syncope/severe aortic stenosis  - I think we know now that her syncope is related to her aortic stenosis   -They do not want any aggressive measures taken  -I agree and will arrange for hospice from [facility]  2. Dyslipidemia  -I see no reason to check her cholesterol in future  3. Hypertension  -Blood pressure is elevated but she does need a slightly higher blood pressure and I would not adjust any of her medicines. -If she needs medicine for hypertension ensure that its not preload reducer  4. Chronic atrial fibrillation anticoagulated on Coumadin  -Monitor did not show any pauses. Follow-up with me in 6 months    No orders of the defined types were placed in this encounter. We discussed the expected course, resolution and complications of the diagnosis(es) in detail. Medication risks, benefits, costs, interactions, and alternatives were discussed as indicated. I advised him to contact the office if his condition worsens, changes or fails to improve as anticipated.  He expressed understanding with the diagnosis(es) and plan          Follow-up and Dispositions  ·   Return in about 6 months (around 10/21/2022). I have discussed the diagnosis with  Kallie Rice and the intended plan as seen in the above orders. Questions were answered concerning future plans. I have discussed medication side effects and warnings with the patient as well. Thank you,  Charlene Ladd MD for involving me in the care of  Kallie Rice. Please do not hesitate to contact me for further questions/concerns. Inder Felipe MD, [address]      [place] [address] [address]      [phone] / [phone] Fax

## 2022-04-20 NOTE — PATIENT INSTRUCTIONS

## 2022-04-21 ENCOUNTER — OFFICE VISIT (OUTPATIENT)
Dept: CARDIOLOGY CLINIC | Age: 87
End: 2022-04-21
Payer: MEDICARE

## 2022-04-21 VITALS
SYSTOLIC BLOOD PRESSURE: 170 MMHG | HEIGHT: 66 IN | OXYGEN SATURATION: 99 % | BODY MASS INDEX: 19.29 KG/M2 | WEIGHT: 120 LBS | DIASTOLIC BLOOD PRESSURE: 88 MMHG | HEART RATE: 94 BPM

## 2022-04-21 DIAGNOSIS — I48.20 CHRONIC ATRIAL FIBRILLATION (HCC): ICD-10-CM

## 2022-04-21 DIAGNOSIS — Z79.01 CHRONIC ANTICOAGULATION: ICD-10-CM

## 2022-04-21 DIAGNOSIS — I35.0 AORTIC STENOSIS, SEVERE: Primary | ICD-10-CM

## 2022-04-21 DIAGNOSIS — R42 DIZZY SPELLS: ICD-10-CM

## 2022-04-21 DIAGNOSIS — I10 PRIMARY HYPERTENSION: Primary | ICD-10-CM

## 2022-04-21 PROCEDURE — 1101F PT FALLS ASSESS-DOCD LE1/YR: CPT | Performed by: SPECIALIST

## 2022-04-21 PROCEDURE — 1090F PRES/ABSN URINE INCON ASSESS: CPT | Performed by: SPECIALIST

## 2022-04-21 PROCEDURE — G8432 DEP SCR NOT DOC, RNG: HCPCS | Performed by: SPECIALIST

## 2022-04-21 PROCEDURE — G8420 CALC BMI NORM PARAMETERS: HCPCS | Performed by: SPECIALIST

## 2022-04-21 PROCEDURE — G8427 DOCREV CUR MEDS BY ELIG CLIN: HCPCS | Performed by: SPECIALIST

## 2022-04-21 PROCEDURE — G0463 HOSPITAL OUTPT CLINIC VISIT: HCPCS | Performed by: SPECIALIST

## 2022-04-21 PROCEDURE — G8536 NO DOC ELDER MAL SCRN: HCPCS | Performed by: SPECIALIST

## 2022-04-21 PROCEDURE — 99214 OFFICE O/P EST MOD 30 MIN: CPT | Performed by: SPECIALIST

## 2022-04-21 NOTE — LETTER
4/21/2022    Patient: Flores Quinones   YOB: 1929   Date of Visit: 4/21/2022     Milena Reyes MD  [address] [place] [address]  [place]    Dear Milena Reyes MD,      Thank you for referring Ms. Flores Quinones to [facility] for evaluation. My notes for this consultation are attached. If you have questions, please do not hesitate to call me. I look forward to following your patient along with you.       Sincerely,    Dayton Halsted, MD

## 2022-04-21 NOTE — PROGRESS NOTES
Abril Cameron is a 80 y.o. female    Visit Vitals  BP (!) 170/88 (BP 1 Location: Left upper arm, BP Patient Position: Sitting, BP Cuff Size: Adult)   Pulse 94   Ht 5' 6\" (1.676 m)   Wt 120 lb (54.4 kg)   SpO2 99%   BMI 19.37 kg/m²       Chief Complaint   Patient presents with    Other     AORTIC STENOSIS        Chest pain NO  SOB YES  Dizziness NO  Swelling NO  Recent hospital visit NO  Refills NO  COVID VACCINE STATUS YES  HAD COVID?  NO

## 2022-04-29 LAB
INR PPP: 1.9 (ref 0.9–1.2)
PROTHROMBIN TIME: 19.7 SEC (ref 9.1–12)

## 2022-04-29 NOTE — PROGRESS NOTES
INR has improved. Patient to repeat INR in 3 weeks. No changes to medications in the meantime. Please send lab slip to [facility] if patient needs it.

## 2022-05-02 ENCOUNTER — TELEPHONE (OUTPATIENT)
Dept: INTERNAL MEDICINE CLINIC | Age: 87
End: 2022-05-02

## 2022-05-02 DIAGNOSIS — I48.91 ATRIAL FIBRILLATION, UNSPECIFIED TYPE (HCC): Primary | ICD-10-CM

## 2022-05-02 NOTE — TELEPHONE ENCOUNTER
Patient voiced understanding of lab results and will repeat in 3 weeks. Faxing over paper work to [facility] [phone].

## 2022-05-09 ENCOUNTER — TELEPHONE (OUTPATIENT)
Dept: CARDIOLOGY CLINIC | Age: 87
End: 2022-05-09

## 2022-05-09 NOTE — TELEPHONE ENCOUNTER
Patients son in law calling, following up on hospice conversation. Please advise.      Matt Cowan [phone]

## 2022-05-09 NOTE — TELEPHONE ENCOUNTER
[facility] called H[facility]ice and gave verbal order - previous order in chart. Also gave son in law # to call for contact.

## 2022-05-10 ENCOUNTER — HOSPICE ADMISSION (OUTPATIENT)
Dept: HOSPICE | Facility: HOSPICE | Age: 87
End: 2022-05-10

## 2022-05-10 ENCOUNTER — TELEPHONE (OUTPATIENT)
Dept: CARDIOLOGY CLINIC | Age: 87
End: 2022-05-10

## 2022-05-10 NOTE — TELEPHONE ENCOUNTER
Nicky from [facility] calling to get an updated hospice order for the patient. If we could fax it to [phone].

## 2022-05-14 ENCOUNTER — APPOINTMENT (OUTPATIENT)
Dept: CT IMAGING | Age: 87
End: 2022-05-14
Attending: STUDENT IN AN ORGANIZED HEALTH CARE EDUCATION/TRAINING PROGRAM
Payer: MEDICARE

## 2022-05-14 ENCOUNTER — HOSPITAL ENCOUNTER (EMERGENCY)
Age: 87
Discharge: HOME | End: 2022-05-14
Attending: STUDENT IN AN ORGANIZED HEALTH CARE EDUCATION/TRAINING PROGRAM | Admitting: STUDENT IN AN ORGANIZED HEALTH CARE EDUCATION/TRAINING PROGRAM
Payer: MEDICARE

## 2022-05-14 VITALS
OXYGEN SATURATION: 99 % | HEIGHT: 66 IN | RESPIRATION RATE: 20 BRPM | WEIGHT: 120 LBS | SYSTOLIC BLOOD PRESSURE: 163 MMHG | TEMPERATURE: 97.5 F | HEART RATE: 83 BPM | BODY MASS INDEX: 19.29 KG/M2 | DIASTOLIC BLOOD PRESSURE: 93 MMHG

## 2022-05-14 DIAGNOSIS — R07.81 RIB PAIN ON LEFT SIDE: ICD-10-CM

## 2022-05-14 DIAGNOSIS — M54.6 ACUTE LEFT-SIDED THORACIC BACK PAIN: Primary | ICD-10-CM

## 2022-05-14 LAB
ALBUMIN SERPL-MCNC: 3.9 G/DL (ref 3.5–5)
ALBUMIN/GLOB SERPL: 1.3 {RATIO} (ref 1.1–2.2)
ALP SERPL-CCNC: 99 U/L (ref 45–117)
ALT SERPL-CCNC: 18 U/L (ref 12–78)
ANION GAP SERPL CALC-SCNC: 8 MMOL/L (ref 5–15)
AST SERPL-CCNC: 21 U/L (ref 15–37)
BASOPHILS # BLD: 0.1 K/UL (ref 0–0.1)
BASOPHILS NFR BLD: 1 % (ref 0–1)
BILIRUB SERPL-MCNC: 2 MG/DL (ref 0.2–1)
BUN SERPL-MCNC: 16 MG/DL (ref 6–20)
BUN/CREAT SERPL: 20 (ref 12–20)
CALCIUM SERPL-MCNC: 9.2 MG/DL (ref 8.5–10.1)
CHLORIDE SERPL-SCNC: 106 MMOL/L (ref 97–108)
CO2 SERPL-SCNC: 26 MMOL/L (ref 21–32)
CREAT SERPL-MCNC: 0.81 MG/DL (ref 0.55–1.02)
DIFFERENTIAL METHOD BLD: ABNORMAL
EOSINOPHIL # BLD: 0.1 K/UL (ref 0–0.4)
EOSINOPHIL NFR BLD: 2 % (ref 0–7)
ERYTHROCYTE [DISTWIDTH] IN BLOOD BY AUTOMATED COUNT: 13.6 % (ref 11.5–14.5)
GLOBULIN SER CALC-MCNC: 3 G/DL (ref 2–4)
GLUCOSE SERPL-MCNC: 123 MG/DL (ref 65–100)
HCT VFR BLD AUTO: 37.5 % (ref 35–47)
HGB BLD-MCNC: 12.5 G/DL (ref 11.5–16)
IMM GRANULOCYTES # BLD AUTO: 0 K/UL (ref 0–0.04)
IMM GRANULOCYTES NFR BLD AUTO: 0 % (ref 0–0.5)
INR PPP: 2.5 (ref 0.9–1.1)
LYMPHOCYTES # BLD: 0.9 K/UL (ref 0.8–3.5)
LYMPHOCYTES NFR BLD: 20 % (ref 12–49)
MAGNESIUM SERPL-MCNC: 2.2 MG/DL (ref 1.6–2.4)
MCH RBC QN AUTO: 34.3 PG (ref 26–34)
MCHC RBC AUTO-ENTMCNC: 33.3 G/DL (ref 30–36.5)
MCV RBC AUTO: 103 FL (ref 80–99)
MONOCYTES # BLD: 0.5 K/UL (ref 0–1)
MONOCYTES NFR BLD: 11 % (ref 5–13)
NEUTS SEG # BLD: 3.1 K/UL (ref 1.8–8)
NEUTS SEG NFR BLD: 66 % (ref 32–75)
NRBC # BLD: 0 K/UL (ref 0–0.01)
NRBC BLD-RTO: 0 PER 100 WBC
PLATELET # BLD AUTO: 166 K/UL (ref 150–400)
PMV BLD AUTO: 11.4 FL (ref 8.9–12.9)
POTASSIUM SERPL-SCNC: 4 MMOL/L (ref 3.5–5.1)
PROT SERPL-MCNC: 6.9 G/DL (ref 6.4–8.2)
PROTHROMBIN TIME: 24.3 SEC (ref 9–11.1)
RBC # BLD AUTO: 3.64 M/UL (ref 3.8–5.2)
SODIUM SERPL-SCNC: 140 MMOL/L (ref 136–145)
TROPONIN-HIGH SENSITIVITY: 62 NG/L (ref 0–51)
TROPONIN-HIGH SENSITIVITY: 64 NG/L (ref 0–51)
WBC # BLD AUTO: 4.7 K/UL (ref 3.6–11)

## 2022-05-14 PROCEDURE — 84484 ASSAY OF TROPONIN QUANT: CPT

## 2022-05-14 PROCEDURE — 36415 COLL VENOUS BLD VENIPUNCTURE: CPT

## 2022-05-14 PROCEDURE — 99285 EMERGENCY DEPT VISIT HI MDM: CPT

## 2022-05-14 PROCEDURE — 80053 COMPREHEN METABOLIC PANEL: CPT

## 2022-05-14 PROCEDURE — 85610 PROTHROMBIN TIME: CPT

## 2022-05-14 PROCEDURE — 74011250637 HC RX REV CODE- 250/637: Performed by: STUDENT IN AN ORGANIZED HEALTH CARE EDUCATION/TRAINING PROGRAM

## 2022-05-14 PROCEDURE — 83735 ASSAY OF MAGNESIUM: CPT

## 2022-05-14 PROCEDURE — 74011000636 HC RX REV CODE- 636: Performed by: STUDENT IN AN ORGANIZED HEALTH CARE EDUCATION/TRAINING PROGRAM

## 2022-05-14 PROCEDURE — 85025 COMPLETE CBC W/AUTO DIFF WBC: CPT

## 2022-05-14 PROCEDURE — 71275 CT ANGIOGRAPHY CHEST: CPT

## 2022-05-14 RX ORDER — VALACYCLOVIR HYDROCHLORIDE 1 G/1
1000 TABLET, FILM COATED ORAL 3 TIMES DAILY
Qty: 21 TABLET | Refills: 0 | Status: SHIPPED | OUTPATIENT
Start: 2022-05-14 | End: 2022-05-14 | Stop reason: SDUPTHER

## 2022-05-14 RX ORDER — ACETAMINOPHEN AND CODEINE PHOSPHATE 300; 30 MG/1; MG/1
1 TABLET ORAL
Status: COMPLETED | OUTPATIENT
Start: 2022-05-14 | End: 2022-05-14

## 2022-05-14 RX ORDER — ACETAMINOPHEN AND CODEINE PHOSPHATE 300; 30 MG/1; MG/1
1 TABLET ORAL
Qty: 12 TABLET | Refills: 0 | Status: SHIPPED | OUTPATIENT
Start: 2022-05-14 | End: 2022-05-14 | Stop reason: SDUPTHER

## 2022-05-14 RX ORDER — VALACYCLOVIR HYDROCHLORIDE 1 G/1
1000 TABLET, FILM COATED ORAL 3 TIMES DAILY
Qty: 21 TABLET | Refills: 0 | Status: SHIPPED | OUTPATIENT
Start: 2022-05-14 | End: 2022-05-21

## 2022-05-14 RX ORDER — ACETAMINOPHEN AND CODEINE PHOSPHATE 300; 30 MG/1; MG/1
1 TABLET ORAL
Qty: 12 TABLET | Refills: 0 | Status: SHIPPED | OUTPATIENT
Start: 2022-05-14 | End: 2022-05-17

## 2022-05-14 RX ADMIN — ACETAMINOPHEN AND CODEINE PHOSPHATE 1 TABLET: 300; 30 TABLET ORAL at 08:35

## 2022-05-14 RX ADMIN — IOPAMIDOL 100 ML: 755 INJECTION, SOLUTION INTRAVENOUS at 09:20

## 2022-05-14 NOTE — ED TRIAGE NOTES
Patient presents to ED with reported left side back pain that radiates to anterior ribs. States the pain started around 2AM when she went to lie back down in the bed. Hx of HTN, afib, and recent dx of aortic stenosis.

## 2022-05-14 NOTE — DISCHARGE INSTRUCTIONS
- Start Valtrex for possible early shingles, if this is shingles you may see a blistering rash in the coming days  - Use Tylenol with codeine as needed for pain  - Be aware that Valtrex can interfere with the effectiveness of coumadin so discuss when it would be most appropriate to get a repeat INR when you see your primary doctor  Follow up with Dr. Nicky Ramesh as soon as possible as well.   - Return for fevers, worsening chest pain, shortness of breath, dizziness or passing out, or any new or worsening symptoms

## 2022-05-14 NOTE — ED PROVIDER NOTES
Chief Complaint   Patient presents with    Back Pain    Chest Pain     This is a 24-year-old female with a history of atrial fibrillation on coumadin, hypertension, IBS, presenting with pain in her back which she localizes to her left lower thoracic region radiating to her left anterior rib cage. Feels like a sharp pain. She reports feeling chronically short of breath, not acutely worse today. Symptoms started abruptly this morning at 0200 while she was resting, shortly after she had gotten out of bed to use the restroom. No fevers, abdominal pain, vomiting, or syncope. Denies any rash that she has noticed recently. Has not had symptoms of this nature in the past.  No other systemic complaints. Symptoms are moderate in nature without alleviating factors.       Past Medical History:   Diagnosis Date    Arrhythmia     A-Fib  on Warfarin    Arthritis     hands and knees bilaterally    Breast cancer ([address] 2019    left breast    Diverticula, colon     Hypertension     IBS (irritable bowel syndrome)     Irregular heart beat     Psychiatric disorder     some anxiety and depression       Past Surgical History:   Procedure Laterality Date    HX BREAST LUMPECTOMY Left 2019    LEFT BREAST LUMPECTOMY AND LEFT BREAST SENTINEL NODE BIOPSY WIHT BLUE DYE performed by Monse Sykes [facility] at [facility] HX CATARACT REMOVAL Left     cataract    HX CATARACT REMOVAL Right     cataract    HX  SECTION      X 3    HX CHOLECYSTECTOMY      HX COLONOSCOPY  8/28/15    with endoscopy day before    HX HYSTERECTOMY      partial    NEUROLOGICAL PROCEDURE UNLISTED      lumbar surgery          Family History:   Problem Relation Age of Onset    Tuberculosis Mother        Social History     Socioeconomic History    Marital status:      Spouse name: Not on file    Number of children: Not on file    Years of education: Not on file    Highest education level: Not on file Occupational History    Not on file   Tobacco Use    Smoking status: Former Smoker     Packs/day: 0.25     Quit date:      Years since quittin.4    Smokeless tobacco: Never Used   Substance and Sexual Activity    Alcohol use: Yes     Comment: glass of wine every other month, holidays    Drug use: Never    Sexual activity: Never   Other Topics Concern    Not on file   Social History Narrative    Not on file     Social Determinants of Health     Financial Resource Strain:     Difficulty of Paying Living Expenses: Not on file   Food Insecurity:     Worried About Running Out of Food in the Last Year: Not on file    [place] of Food in the Last Year: Not on file   Transportation Needs:     Lack of Transportation (Medical): Not on file    Lack of Transportation (Non-Medical): Not on file   Physical Activity:     Days of Exercise per Week: Not on file    Minutes of Exercise per Session: Not on file   Stress:     Feeling of Stress : Not on file   Social Connections:     Frequency of Communication with Friends and Family: Not on file    Frequency of Social Gatherings with Friends and Family: Not on file    Attends Yarsanism Services: Not on file    Active Member of [address] [place] [address] [employer] or Organizations: Not on file    Attends Club or Organization Meetings: Not on file    Marital Status: Not on file   Intimate Partner Violence:     Fear of Current or Ex-Partner: Not on file    Emotionally Abused: Not on file    Physically Abused: Not on file    Sexually Abused: Not on file   Housing Stability:     Unable to Pay for Housing in the Last Year: Not on file    Number of [place] in the Last Year: Not on file    Unstable Housing in the Last Year: Not on file         ALLERGIES: Pcn [penicillins]    Review of Systems   Constitutional: Negative for fever. HENT: Negative for facial swelling. Eyes: Negative for redness. Respiratory: Positive for shortness of breath.     Cardiovascular: Positive for chest pain. Gastrointestinal: Negative for abdominal pain and vomiting. Genitourinary: Negative for difficulty urinating. Musculoskeletal: Positive for back pain. Neurological: Negative for syncope. Psychiatric/Behavioral: Negative for confusion. Vitals:    05/14/22 0745 05/14/22 0748 05/14/22 0800 05/14/22 1000   BP:  (!) 174/93 (!) 153/87 (!) 161/98   Pulse:  85 75 73   Resp:  16 17 17   Temp:  97.5 °F (36.4 °C)     SpO2:  99% 98% 99%   Weight: 54.4 kg (120 lb) 54.4 kg (120 lb)     Height: 5' 6\" (1.676 m) 5' 6\" (1.676 m)              Physical Exam  General:  Awake and alert, NAD  HEENT:  NC/AT, equal pupils, moist mucous membranes  Neck:   Normal inspection, full range of motion  Cardiac:  RRR, no murmurs  Respiratory:  Clear bilaterally, no wheezes, rales, rhonchi  Abdomen:  Soft and nontender, nondistended  Back:   No rashes, full ROM  Extremities: Warm and well perfused, no peripheral edema  Neuro:  Moving all extremities symmetrically without gross motor deficit  Skin:   See above    RESULTS  Recent Results (from the past 12 hour(s))   CBC WITH AUTOMATED DIFF    Collection Time: 05/14/22  7:52 AM   Result Value Ref Range    WBC 4.7 3.6 - 11.0 K/uL    RBC 3.64 (L) 3.80 - 5.20 M/uL    HGB 12.5 11.5 - 16.0 g/dL    HCT 37.5 35.0 - 47.0 %    .0 (H) 80.0 - 99.0 FL    MCH 34.3 (H) 26.0 - 34.0 PG    MCHC 33.3 30.0 - 36.5 g/dL    RDW 13.6 11.5 - 14.5 %    PLATELET 054 943 - 559 K/uL    MPV 11.4 8.9 - 12.9 FL    NRBC 0.0 0  WBC    ABSOLUTE NRBC 0.00 0.00 - 0.01 K/uL    NEUTROPHILS 66 32 - 75 %    LYMPHOCYTES 20 12 - 49 %    MONOCYTES 11 5 - 13 %    EOSINOPHILS 2 0 - 7 %    BASOPHILS 1 0 - 1 %    IMMATURE GRANULOCYTES 0 0.0 - 0.5 %    ABS. NEUTROPHILS 3.1 1.8 - 8.0 K/UL    ABS. LYMPHOCYTES 0.9 0.8 - 3.5 K/UL    ABS. MONOCYTES 0.5 0.0 - 1.0 K/UL    ABS. EOSINOPHILS 0.1 0.0 - 0.4 K/UL    ABS. BASOPHILS 0.1 0.0 - 0.1 K/UL    ABS. IMM.  GRANS. 0.0 0.00 - 0.04 K/UL    DF AUTOMATED     METABOLIC PANEL, COMPREHENSIVE    Collection Time: 05/14/22  7:52 AM   Result Value Ref Range    Sodium 140 136 - 145 mmol/L    Potassium 4.0 3.5 - 5.1 mmol/L    Chloride 106 97 - 108 mmol/L    CO2 26 21 - 32 mmol/L    Anion gap 8 5 - 15 mmol/L    Glucose 123 (H) 65 - 100 mg/dL    BUN 16 6 - 20 MG/DL    Creatinine 0.81 0.55 - 1.02 MG/DL    BUN/Creatinine ratio 20 12 - 20      GFR est AA >60 >60 ml/min/1.73m2    GFR est non-AA >60 >60 ml/min/1.73m2    Calcium 9.2 8.5 - 10.1 MG/DL    Bilirubin, total 2.0 (H) 0.2 - 1.0 MG/DL    ALT (SGPT) 18 12 - 78 U/L    AST (SGOT) 21 15 - 37 U/L    Alk. phosphatase 99 45 - 117 U/L    Protein, total 6.9 6.4 - 8.2 g/dL    Albumin 3.9 3.5 - 5.0 g/dL    Globulin 3.0 2.0 - 4.0 g/dL    A-G Ratio 1.3 1.1 - 2.2     TROPONIN-HIGH SENSITIVITY    Collection Time: 05/14/22  7:52 AM   Result Value Ref Range    Troponin-High Sensitivity 64 (H) 0 - 51 ng/L   MAGNESIUM    Collection Time: 05/14/22  7:52 AM   Result Value Ref Range    Magnesium 2.2 1.6 - 2.4 mg/dL   PROTHROMBIN TIME + INR    Collection Time: 05/14/22  7:52 AM   Result Value Ref Range    INR 2.5 (H) 0.9 - 1.1      Prothrombin time 24.3 (H) 9.0 - 11.1 sec   TROPONIN-HIGH SENSITIVITY    Collection Time: 05/14/22  9:46 AM   Result Value Ref Range    Troponin-High Sensitivity 62 (H) 0 - 51 ng/L        IMAGING  CTA CHEST W OR W WO CONT    Result Date: 5/14/2022  1. No aortic dissection. 2. Small pleural effusions. 3. Clear lungs. Procedures - none unless documented below  EKG as interpreted by me:  Atrial fibrillation with a ventricular rate of 85, left axis deviation, normal intervals, grossly no ST or T-wave changes suggesting acute ischemia. ED course: Labs, EKG and imaging reviewed. She describes a sharp dermatomal pain extending from her left lower thoracic area wrapping around to her left anterior rib cage. It does not cross the midline. No blistering rash but the history raises suspicion for early zoster.   CTA chest ultimately negative for evidence of dissection or pulmonary embolism. Discussed serial troponins (first one mildly elevated, second did not reveal significant change from the first) and clinical history with cardiology Salome Ahumada) who did not feel strongly that this represented acute coronary ischemia, felt that she was safe to follow up with her cardiologist [facility]) in the outpatient setting without additional cardiac diagnostics. She's feeling better after receiving Tylenol 3, will have her start Valtrex and follow up with her primary physician and cardiologist to be reevaluated. Discussed possibility of Valtrex interfering with coumadin efficacy and to raise this with her primary doctor this week. Will discharge home, strict return precautions communicated. The patient has been given verbal and written advice regarding today's presentation including reasons to re-attend, specifically signs and symptoms of concern or worsening condition were discussed and understood by the patient.      Impression: Acute thoracic back pain, left sided rib pain, possible herpes zoster  Disposition: Discharge home

## 2022-05-16 ENCOUNTER — TELEPHONE (OUTPATIENT)
Dept: INTERNAL MEDICINE CLINIC | Age: 87
End: 2022-05-16

## 2022-05-16 NOTE — TELEPHONE ENCOUNTER
----- Message from Noreen Mills sent at 5/16/2022 10:13 AM EDT -----  Subject: Appointment Request    Reason for Call: Urgent (Patient Request) ED Follow Up Visit    QUESTIONS  Type of Appointment? Established Patient  Reason for appointment request? Available appointments did not meet   patient need  Additional Information for Provider? Came to our hospital on [facility]. The meds that were given to her at the ER   (Valacyclovir) conflict with the medication that has been given with the   primary dr. Sienna Nielsen to know what to do.   ---------------------------------------------------------------------------  --------------  [address]  What is the best way for the office to contact you? Do not leave any   message, patient will call back for answer, OK to respond with electronic   message via [employer] portal (only for patients who have registered [facility]   account)  Preferred Call Back Phone Number? [phone]  ---------------------------------------------------------------------------  --------------  SCRIPT ANSWERS  Relationship to Patient? Self  (Patient requests to see provider urgently. )? Yes  Have you been diagnosed with, awaiting test results for, or told that you   are suspected of having COVID-19 (Coronavirus)? (If patient has tested   negative or was tested as a requirement for work, school, or travel and   not based on symptoms, answer no)? No  Within the past 10 days have you developed any of the following symptoms   (answer no if symptoms have been present longer than 10 days or began   more than 10 days ago)? Fever or Chills, Cough, Shortness of breath or   difficulty breathing, Loss of taste or smell, Sore throat, Nasal   congestion, Sneezing or runny nose, Fatigue or generalized body aches   (answer no if pain is specific to a body part e.g. back pain), Diarrhea,   Headache? No  Have you had close contact with someone with COVID-19 in the last 7 days?    No  (Service Expert  click yes below to proceed with [employer]ual   Scheduling)?  Yes

## 2022-05-16 NOTE — TELEPHONE ENCOUNTER
----- Message from Guanaco Israel sent at 5/16/2022 10:13 AM EDT -----  Subject: Message to Provider    QUESTIONS  Information for Provider? Came to our hospital on sat the [facility]   [facility]. The meds that were given to her at the ER (Valacyclovir)   conflict with the medication that has been given with the primary dr. Saul Rubin to know what to do.   ---------------------------------------------------------------------------  --------------  [address]  What is the best way for the office to contact you? Do not leave any   message, patient will call back for answer, OK to respond with electronic   message via [email or web address] portal (only for patients who have registered [email or web address]   account)  Preferred Call Back Phone Number? [phone]  ---------------------------------------------------------------------------  --------------  SCRIPT ANSWERS  Relationship to Patient?  Self

## 2022-05-16 NOTE — TELEPHONE ENCOUNTER
I tried calling her back without success. Can you see what medication they are saying interacts with Valtrex ?      Nicholas Millan

## 2022-05-17 NOTE — TELEPHONE ENCOUNTER
Patient voiced understanding of medication would be fine to take together per Dr. Lisa Nunez. Also patient had blood tests done Saturday here at ED.

## 2022-05-17 NOTE — TELEPHONE ENCOUNTER
----- Message from Shayy Roth sent at 5/17/2022  8:20 AM EDT -----  Subject: Message to Provider    QUESTIONS  Information for Provider? Pt returning Dr. Dre Yates call about   medications that are interacting with each other. Med that was give at ED   is Valacyclavir, inter acts with her blood thinner. ED  told pt that   this med would mess up the the blood thinner. Went to ED for shingles,   none have come out as of yet. Please return pt's call.  ---------------------------------------------------------------------------  --------------  CALL BACK INFO  What is the best way for the office to contact you? OK to leave message on   voicemail  Preferred Call Back Phone Number?  [phone]  ---------------------------------------------------------------------------  --------------  SCRIPT ANSWERS  undefined

## 2022-06-03 ENCOUNTER — TELEPHONE (OUTPATIENT)
Dept: CARDIOLOGY CLINIC | Age: 87
End: 2022-06-03

## 2022-06-03 NOTE — TELEPHONE ENCOUNTER
Cornelius Kirby from [facility] is calling to notify us that the patient has cancelled the appointment with them due to her not being ready to have that conversation. Please Advise.       [phone]

## 2022-06-06 DIAGNOSIS — Z79.01 ANTICOAGULATION GOAL OF INR 2 TO 3: ICD-10-CM

## 2022-06-06 DIAGNOSIS — D68.9 COAGULATION DISORDER (HCC): ICD-10-CM

## 2022-06-06 DIAGNOSIS — Z51.81 ANTICOAGULATION GOAL OF INR 2 TO 3: ICD-10-CM

## 2022-06-07 RX ORDER — FLUTICASONE PROPIONATE 50 MCG
SPRAY, SUSPENSION (ML) NASAL
Qty: 30 G | Refills: 1 | Status: SHIPPED | OUTPATIENT
Start: 2022-06-07

## 2022-06-07 RX ORDER — WARFARIN 4 MG/1
TABLET ORAL
Qty: 45 TABLET | Refills: 1 | Status: SHIPPED | OUTPATIENT
Start: 2022-06-07

## 2022-06-27 ENCOUNTER — TELEPHONE (OUTPATIENT)
Dept: CARDIOLOGY CLINIC | Age: 87
End: 2022-06-27

## 2022-06-27 NOTE — TELEPHONE ENCOUNTER
Patient needing a call back about pain in her back and maybe needing an appt    She can be reached at [phone]    [facility]

## 2022-06-28 NOTE — TELEPHONE ENCOUNTER
----- Message from Oz Castillo sent at 6/28/2022  9:51 AM EDT -----  Subject: Message to Provider    QUESTIONS  Information for Provider? pt would like a phone call back to talk to a   nurse or someone from office. pt did not tell me why  ---------------------------------------------------------------------------  --------------  CALL BACK INFO  What is the best way for the office to contact you? OK to leave message on   voicemail  Preferred Call Back Phone Number?  [phone]  ---------------------------------------------------------------------------  --------------  SCRIPT ANSWERS  undefined

## 2022-06-28 NOTE — TELEPHONE ENCOUNTER
Patient is shaking on left side of face and neck. Patient voiced understanding of muscle relaxer for the next day or so and call office with any change and Thursday call with how she feels. Patient may have anxiety was told only 2 years to live.

## 2022-07-01 ENCOUNTER — TELEPHONE (OUTPATIENT)
Dept: INTERNAL MEDICINE CLINIC | Age: 87
End: 2022-07-01

## 2022-07-01 RX ORDER — CYCLOBENZAPRINE HCL 10 MG
10 TABLET ORAL
Qty: 20 TABLET | Refills: 0 | Status: SHIPPED | OUTPATIENT
Start: 2022-07-01

## 2022-07-01 NOTE — TELEPHONE ENCOUNTER
----- Message from Mary Gracestevie Delonte sent at 6/30/2022  4:17 PM EDT -----  Subject: Message to Provider    QUESTIONS  Information for Provider? PT called stating not much improvement new meds. Please call back. ---------------------------------------------------------------------------  --------------  Sarah GRIFFIN  What is the best way for the office to contact you? OK to leave message on   voicemail  Preferred Call Back Phone Number?  [phone]  ---------------------------------------------------------------------------  --------------  SCRIPT ANSWERS  undefined

## 2022-07-01 NOTE — TELEPHONE ENCOUNTER
Spoke with pt, she states that Methocarbamol isn't helping with her muscle spasms. Spoke with Dr Lenora Posey and he sent in flexeril for the pt. Informed pt of the medication change, pt states understanding.

## 2022-07-29 RX ORDER — POLYETHYLENE GLYCOL 3350 17 G/17G
POWDER, FOR SOLUTION ORAL
Qty: 1530 G | Refills: 2 | Status: SHIPPED | OUTPATIENT
Start: 2022-07-29

## 2022-08-05 ENCOUNTER — TELEPHONE (OUTPATIENT)
Dept: CARDIOLOGY CLINIC | Age: 87
End: 2022-08-05

## 2022-08-05 NOTE — TELEPHONE ENCOUNTER
Please give a call back Nicolas Topete her son in law because the patient has declined rapidly over the last couple of days and they have hospice coming to the home. Hospice was suppose to notify the doctor of the patient's condition also. Patient's son in law would like to know if it's anything else he needs to do.     Please assist.    [phone]

## (undated) DEVICE — COVER LT HNDL PLAS RIG 1 PER PK

## (undated) DEVICE — STERILE POLYISOPRENE POWDER-FREE SURGICAL GLOVES: Brand: PROTEXIS

## (undated) DEVICE — NEEDLE HYPO 25GA L1.5IN BVL ORIENTED ECLIPSE

## (undated) DEVICE — SURGICAL PROCEDURE KIT GEN LAPAROSCOPY LF

## (undated) DEVICE — INSUFFLATION NEEDLE TO ESTABLISH PNEUMOPERITONEUM.: Brand: INSUFFLATION NEEDLE

## (undated) DEVICE — (D)PREP SKN CHLRAPRP APPL 26ML -- CONVERT TO ITEM 371833

## (undated) DEVICE — INTENDED FOR TISSUE SEPARATION, AND OTHER PROCEDURES THAT REQUIRE A SHARP SURGICAL BLADE TO PUNCTURE OR CUT.: Brand: BARD-PARKER ® CARBON RIB-BACK BLADES

## (undated) DEVICE — LAPAROSCOPIC SCISSORS: Brand: EPIX LAPAROSCOPIC SCISSORS

## (undated) DEVICE — STRAP,POSITIONING,KNEE/BODY,FOAM,4X60": Brand: MEDLINE

## (undated) DEVICE — APPLICATOR BNDG 1MM ADH PREMIERPRO EXOFIN

## (undated) DEVICE — DRAPE XR C ARM 41X74IN LF --

## (undated) DEVICE — ROCKER SWITCH PENCIL BLADE ELECTRODE, HOLSTER: Brand: EDGE

## (undated) DEVICE — CHEST PACK: Brand: MEDLINE INDUSTRIES, INC.

## (undated) DEVICE — Device

## (undated) DEVICE — INFECTION CONTROL KIT SYS

## (undated) DEVICE — NEEDLE HYPO 22GA L1.5IN BLK S STL HUB POLYPR SHLD REG BVL

## (undated) DEVICE — BAG SPEC REM 224ML W4XL6IN DIA10MM 1 HND GYN DISP ENDOPCH

## (undated) DEVICE — TROCARS: Brand: KII® BALLOON BLUNT TIP SYSTEM

## (undated) DEVICE — SUTURE MCRYL SZ 4-0 L27IN ABSRB UD L19MM PS-2 1/2 CIR PRIM Y426H

## (undated) DEVICE — SET CHOLANGIOGRAPHY 4FR L60CM W/ ARW KARLAN BLLN CATH CRV

## (undated) DEVICE — SOL IRRIGATION INJ NACL 0.9% 500ML BTL

## (undated) DEVICE — CANISTER, RIGID, 3000CC: Brand: MEDLINE INDUSTRIES, INC.

## (undated) DEVICE — RESERVOIR,SUCTION,100CC,SILICONE: Brand: MEDLINE

## (undated) DEVICE — TUBE KT ENDFLTN INSUFFLTN SVL --

## (undated) DEVICE — SUT SLK 2-0SH 30IN BLK --

## (undated) DEVICE — APPLIER CLP M/L SHFT DIA5MM 15 LIG LIGAMAX 5

## (undated) DEVICE — E-Z CLEAN, PTFE COATED, ELECTROSURGICAL LAPAROSCOPIC ELECTRODE, L-HOOK, 33 CM., SINGLE-USE, FOR USE WITH HAND CONTROL PENCIL: Brand: MEGADYNE

## (undated) DEVICE — STERILE POLYISOPRENE POWDER-FREE SURGICAL GLOVES WITH EMOLLIENT COATING: Brand: PROTEXIS

## (undated) DEVICE — TROCAR: Brand: KII® SLEEVE

## (undated) DEVICE — SUTURE VCRL SZ 3-0 L27IN ABSRB UD L26MM SH 1/2 CIR J416H

## (undated) DEVICE — SUTURE MCRYL SZ 4-0 L18IN ABSRB UD L19MM PS-2 3/8 CIR PRIM Y496G

## (undated) DEVICE — LAPAROSCOPIC TROCAR SLEEVE/SINGLE USE: Brand: KII® OPTICAL ACCESS SYSTEM

## (undated) DEVICE — SUTURE SZ 0 27IN 5/8 CIR UR-6  TAPER PT VIOLET ABSRB VICRYL J603H

## (undated) DEVICE — BNDG ADH FABRIC 2X4IN ST LF --

## (undated) DEVICE — DERMABOND SKIN ADH 0.7ML -- DERMABOND ADVANCED 12/BX

## (undated) DEVICE — REM POLYHESIVE ADULT PATIENT RETURN ELECTRODE: Brand: VALLEYLAB

## (undated) DEVICE — DRAIN SURG 19FR 100% SIL RADPQ RND CHN FULL FLUT